# Patient Record
Sex: MALE | Race: BLACK OR AFRICAN AMERICAN | Employment: FULL TIME | URBAN - METROPOLITAN AREA
[De-identification: names, ages, dates, MRNs, and addresses within clinical notes are randomized per-mention and may not be internally consistent; named-entity substitution may affect disease eponyms.]

---

## 2017-01-04 RX ORDER — INSULIN GLARGINE 100 [IU]/ML
INJECTION, SOLUTION SUBCUTANEOUS
Qty: 15 ML | Refills: 0 | Status: SHIPPED | OUTPATIENT
Start: 2017-01-04 | End: 2017-02-09 | Stop reason: SDUPTHER

## 2017-05-03 ENCOUNTER — OFFICE VISIT (OUTPATIENT)
Dept: INTERNAL MEDICINE CLINIC | Age: 57
End: 2017-05-03

## 2017-05-03 VITALS
WEIGHT: 284 LBS | HEART RATE: 62 BPM | SYSTOLIC BLOOD PRESSURE: 133 MMHG | OXYGEN SATURATION: 98 % | BODY MASS INDEX: 45.64 KG/M2 | HEIGHT: 66 IN | RESPIRATION RATE: 20 BRPM | TEMPERATURE: 98.9 F | DIASTOLIC BLOOD PRESSURE: 76 MMHG

## 2017-05-03 DIAGNOSIS — N06.9 ISOLATED PROTEINURIA WITH MORPHOLOGIC LESION: ICD-10-CM

## 2017-05-03 DIAGNOSIS — E78.5 DYSLIPIDEMIA, GOAL LDL BELOW 100: ICD-10-CM

## 2017-05-03 DIAGNOSIS — E11.8 DM (DIABETES MELLITUS) WITH COMPLICATIONS (HCC): Primary | ICD-10-CM

## 2017-05-03 NOTE — MR AVS SNAPSHOT
Visit Information Date & Time Provider Department Dept. Phone Encounter #  
 5/3/2017 10:00 AM Jenny Lunsford MD [facility] [phone] Upcoming Health Maintenance Date Due DTaP/Tdap/Td series (1 - Tdap) 7/8/1981 HEMOGLOBIN A1C Q6M 6/13/2017 FOOT EXAM Q1 6/21/2017 MICROALBUMIN Q1 6/21/2017 LIPID PANEL Q1 6/21/2017 EYE EXAM RETINAL OR DILATED Q1 7/12/2017 INFLUENZA AGE 9 TO ADULT 8/1/2017 COLONOSCOPY 5/17/2018 Allergies as of 5/3/2017  In Progress On: 5/3/2017 By: Kumar Ortiz LPN No Known Allergies Current Immunizations  Reviewed on 10/12/2015 Name Date Pneumococcal Vaccine (Unspecified Type) 8/2/2008 TB Skin Test (PPD) Intradermal 10/12/2015 Not reviewed this visit You Were Diagnosed With   
  
 Codes Comments DM (diabetes mellitus) with complications ([facility]    -  Primary ICD-10-CM: E11.8 ICD-9-CM: 250.90 Dyslipidemia, goal LDL below 100     ICD-10-CM: E78.5 ICD-9-CM: 272.4 Isolated proteinuria with morphologic lesion     ICD-10-CM: N06.9 ICD-9-CM: 543. 9 Vitals BP Pulse Temp Resp Height(growth percentile) Weight(growth percentile) 133/76 62 98.9 °F (37.2 °C) (Oral) 20 5' 6\" (1.676 m) 284 lb (128.8 kg) SpO2 BMI Smoking Status 98% 45.84 kg/m2 Never Smoker Vitals History BMI and BSA Data Body Mass Index Body Surface Area 45.84 kg/m 2 2.45 m 2 Preferred Pharmacy Pharmacy Name Phone [address] [facility] AT [address] [place] [address]. [phone] Your Updated Medication List  
  
   
This list is accurate as of: 5/3/17 10:38 AM.  Always use your most recent med list.  
  
  
  
  
 aspirin 81 mg tablet Take 81 mg by mouth daily. BD INSULIN PEN NEEDLE UF MINI 31 gauge x 3/16\" Ndle Generic drug:  Insulin Needles (Disposable) USE AS DIRECTED  
  
 empagliflozin 25 mg tablet Commonly known as:  Bernadine Crease Take 1 Tab by mouth daily. glucose blood VI test strips strip Commonly known as:  FREESTYLE LITE STRIPS Use 2 times a day * insulin glargine 100 unit/mL (3 mL) pen Commonly known as:  LANTUS SOLOSTAR  
50 u  HS  
  
 * LANTUS SOLOSTAR 100 unit/mL (3 mL) pen Generic drug:  insulin glargine INJECT 40 UNITS UNDER THE SKIN EVERY NIGHT AT BEDTIME  
  
 losartan-hydroCHLOROthiazide 100-25 mg per tablet Commonly known as:  HYZAAR  
TAKE 1 TABLET EVERY DAY  
  
 lovastatin 40 mg tablet Commonly known as:  MEVACOR Take 1 Tab by mouth nightly. metFORMIN  mg tablet Commonly known as:  GLUCOPHAGE XR  
TAKE 4 TABLETS BY MOUTH EVERY DAY  
  
 * Notice: This list has 2 medication(s) that are the same as other medications prescribed for you. Read the directions carefully, and ask your doctor or other care provider to review them with you. We Performed the Following CBC WITH AUTOMATED DIFF [37531 CPT(R)] HEMOGLOBIN A1C W/O EAG [82674 CPT(R)] LIPID PANEL [71742 CPT(R)] METABOLIC PANEL, COMPREHENSIVE [50956 CPT(R)] MICROALBUMIN, UR, RAND W/ MICROALBUMIN/CREA RATIO [id number] CPT(R)] Introducing [facility]! Dear Mary Grace Jackson: Thank you for requesting a [employer] account. Our records indicate that you already have an active [employer] account. You can access your account anytime at [email or web address] Did you know that you can access your hospital and ER discharge instructions at any time in [employer]? You can also review all of your test results from your hospital stay or ER visit. Additional Information If you have questions, please visit the Frequently Asked Questions section of the [employer] website at [email or web address] Remember, [employer] is NOT to be used for urgent needs. For medical emergencies, dial 911. Now available from your iPhone and Android! Please provide this summary of care documentation to your next provider. Your primary care clinician is listed as Kenrick Carrera. If you have any questions after today's visit, please call [phone].

## 2017-05-03 NOTE — PROGRESS NOTES
Chief Complaint   Patient presents with    Follow Up Chronic Condition     diabetes     Chief Complaint   Patient presents with    Follow Up Chronic Condition     diabetes     SUBJECTIVE: Shakeel Vilchis is a 64 y.o. male seen for a follow up visit; he has diabetes, hypertension, hyperlipidemia, obesity and no known cardiovascular conditions. Current Outpatient Prescriptions   Medication Sig Dispense Refill    LANTUS SOLOSTAR 100 unit/mL (3 mL) pen INJECT 40 UNITS UNDER THE SKIN EVERY NIGHT AT BEDTIME 15 mL 5    empagliflozin (JARDIANCE) 25 mg tablet Take 1 Tab by mouth daily. 30 Tab 5    losartan-hydroCHLOROthiazide (HYZAAR) 100-25 mg per tablet TAKE 1 TABLET EVERY DAY 30 Tab 11    BD INSULIN PEN NEEDLE UF MINI 31 gauge x 3/16\" ndle USE AS DIRECTED 100 Pen Needle 11    lovastatin (MEVACOR) 40 mg tablet Take 1 Tab by mouth nightly. 90 Tab 3    metFORMIN ER (GLUCOPHAGE XR) 500 mg tablet TAKE 4 TABLETS BY MOUTH EVERY  Tab 11    glucose blood VI test strips (FREESTYLE LITE STRIPS) strip Use 2 times a day 100 Strip 5    insulin glargine (LANTUS SOLOSTAR) 100 unit/mL (3 mL) pen 50 u  HS 1 Package 5    aspirin 81 mg tablet Take 81 mg by mouth daily.        Patient Active Problem List   Diagnosis Code    Hypertension I10    Morbid obesity ([facility] E66.01    Diabetes ([address] E11.9    Proteinuria R80.9    Other and unspecified disc disorder of lumbar region M51.9    Type II diabetes mellitus, uncontrolled ([address] E11.65    Dyslipidemia, goal LDL below 100 E78.5    Primary osteoarthritis of one knee M17.10     System Review: Cardiovascular risk analysis - LDL goal is under 100, Cardiovascular ROS - taking medications as instructed, no medication side effects noted, no TIA's, no chest pain on exertion, no dyspnea on exertion, no swelling of ankles, no orthopnea or paroxysmal nocturnal dyspnea, no palpitations, Diabetic ROS - medication compliance: compliant all of the time, diabetic diet compliance: compliant all of the time, home glucose monitoring: is performed sporadically  Last 141 fasting, as high as 212 afternoon CNS ROS - no TIA or stroke-like symptoms. New concerns: none . Wt Readings from Last 3 Encounters:   05/03/17 284 lb (128.8 kg)   12/13/16 276 lb (125.2 kg)   08/25/16 279 lb (126.6 kg)   slipped with diet      OBJECTIVE:  Visit Vitals    /76    Pulse 62    Temp 98.9 °F (37.2 °C) (Oral)    Resp 20    Ht 5' 6\" (1.676 m)    Wt 284 lb (128.8 kg)    SpO2 98%    BMI 45.84 kg/m2      Appearance: alert, well appearing, and in no distress and overweight. General exam: CVS exam BP noted to be well controlled today in office, S1, S2 normal, no gallop, no murmur, chest clear, no JVD, no HSM, no edema. Foot exam - bilateral normal; no swelling, tenderness or skin or vascular lesions. Color and temperature is normal. Sensation is intact. Peripheral pulses are palpable. Diabetic foot exam:   Lab review: orders written for new lab studies as appropriate; see orders, no lab studies available for review at time of visit. Results for orders placed or performed in visit on 12/13/16   AMB POC HEMOGLOBIN A1C   Result Value Ref Range    Hemoglobin A1c (POC) 9.9 %     8.0 tgoday  ASSESSMENT:  Diabetes   Improved     , hypertension controlled, hyperlipidemia well controlled. PLAN:  current treatment plan is effective, no change in therapy  lab results and schedule of future lab studies reviewed with patient  repeat labs ordered prior to next appointment. Regulo Camejo was seen today for follow up chronic condition.     Diagnoses and all orders for this visit:    DM (diabetes mellitus) with complications ([facility]  -     CBC WITH AUTOMATED DIFF  -     LIPID PANEL  -     METABOLIC PANEL, COMPREHENSIVE  -     MICROALBUMIN, UR, RAND W/ MICROALBUMIN/CREA RATIO    Dyslipidemia, goal LDL below 100    Isolated proteinuria with morphologic lesion  -     MICROALBUMIN, UR, RAND W/ MICROALBUMIN/CREA RATIO        Stop   Glimepiride add Link Louder was seen today for follow up chronic condition. Diagnoses and all orders for this visit:    DM (diabetes mellitus) with complications ([facility]  -     CBC WITH AUTOMATED DIFF  -     LIPID PANEL  -     METABOLIC PANEL, COMPREHENSIVE  -     MICROALBUMIN, UR, RAND W/ MICROALBUMIN/CREA RATIO    Dyslipidemia, goal LDL below 100    Isolated proteinuria with morphologic lesion  -     MICROALBUMIN, UR, RAND W/ MICROALBUMIN/CREA RATIO      Chavo Hernandez was seen today for follow up chronic condition.     Diagnoses and all orders for this visit:    DM (diabetes mellitus) with complications ([facility]  -     CBC WITH AUTOMATED DIFF  -     LIPID PANEL  -     METABOLIC PANEL, COMPREHENSIVE  -     MICROALBUMIN, UR, RAND W/ MICROALBUMIN/CREA RATIO    Dyslipidemia, goal LDL below 100    Isolated proteinuria with morphologic lesion  -     MICROALBUMIN, UR, RAND W/ MICROALBUMIN/CREA RATIO      Hypertension    Fair controlled for age based on guidelines  Increase dose  jardiance   May need glipizide again

## 2017-05-04 LAB
ALBUMIN SERPL-MCNC: 4 G/DL (ref 3.5–5.5)
ALBUMIN/CREAT UR: 4.6 MG/G CREAT (ref 0–30)
ALBUMIN/GLOB SERPL: 1.4 {RATIO} (ref 1.2–2.2)
ALP SERPL-CCNC: 96 IU/L (ref 39–117)
ALT SERPL-CCNC: 18 IU/L (ref 0–44)
AST SERPL-CCNC: 17 IU/L (ref 0–40)
BASOPHILS # BLD AUTO: 0 X10E3/UL (ref 0–0.2)
BASOPHILS NFR BLD AUTO: 0 %
BILIRUB SERPL-MCNC: 0.4 MG/DL (ref 0–1.2)
BUN SERPL-MCNC: 14 MG/DL (ref 6–24)
BUN/CREAT SERPL: 16 (ref 9–20)
CALCIUM SERPL-MCNC: 9.3 MG/DL (ref 8.7–10.2)
CHLORIDE SERPL-SCNC: 99 MMOL/L (ref 96–106)
CHOLEST SERPL-MCNC: 155 MG/DL (ref 100–199)
CO2 SERPL-SCNC: 25 MMOL/L (ref 18–29)
CREAT SERPL-MCNC: 0.86 MG/DL (ref 0.76–1.27)
CREAT UR-MCNC: 97.1 MG/DL
EOSINOPHIL # BLD AUTO: 0.1 X10E3/UL (ref 0–0.4)
EOSINOPHIL NFR BLD AUTO: 1 %
ERYTHROCYTE [DISTWIDTH] IN BLOOD BY AUTOMATED COUNT: 14.8 % (ref 12.3–15.4)
GLOBULIN SER CALC-MCNC: 2.8 G/DL (ref 1.5–4.5)
GLUCOSE SERPL-MCNC: 129 MG/DL (ref 65–99)
HBA1C MFR BLD: 9.6 % (ref 4.8–5.6)
HCT VFR BLD AUTO: 47.2 % (ref 37.5–51)
HDLC SERPL-MCNC: 60 MG/DL
HGB BLD-MCNC: 15.4 G/DL (ref 12.6–17.7)
IMM GRANULOCYTES # BLD: 0 X10E3/UL (ref 0–0.1)
IMM GRANULOCYTES NFR BLD: 0 %
INTERPRETATION, 910389: NORMAL
LDLC SERPL CALC-MCNC: 85 MG/DL (ref 0–99)
LYMPHOCYTES # BLD AUTO: 2.7 X10E3/UL (ref 0.7–3.1)
LYMPHOCYTES NFR BLD AUTO: 34 %
Lab: NORMAL
MCH RBC QN AUTO: 27.4 PG (ref 26.6–33)
MCHC RBC AUTO-ENTMCNC: 32.6 G/DL (ref 31.5–35.7)
MCV RBC AUTO: 84 FL (ref 79–97)
MICROALBUMIN UR-MCNC: 4.5 UG/ML
MONOCYTES # BLD AUTO: 0.6 X10E3/UL (ref 0.1–0.9)
MONOCYTES NFR BLD AUTO: 7 %
NEUTROPHILS # BLD AUTO: 4.6 X10E3/UL (ref 1.4–7)
NEUTROPHILS NFR BLD AUTO: 58 %
PDF IMAGE, 910387: NORMAL
PLATELET # BLD AUTO: 267 X10E3/UL (ref 150–379)
POTASSIUM SERPL-SCNC: 3.8 MMOL/L (ref 3.5–5.2)
PROT SERPL-MCNC: 6.8 G/DL (ref 6–8.5)
RBC # BLD AUTO: 5.63 X10E6/UL (ref 4.14–5.8)
SODIUM SERPL-SCNC: 142 MMOL/L (ref 134–144)
TRIGL SERPL-MCNC: 52 MG/DL (ref 0–149)
VLDLC SERPL CALC-MCNC: 10 MG/DL (ref 5–40)
WBC # BLD AUTO: 8 X10E3/UL (ref 3.4–10.8)

## 2017-08-13 ENCOUNTER — HOSPITAL ENCOUNTER (EMERGENCY)
Age: 57
Discharge: HOME | End: 2017-08-13
Attending: EMERGENCY MEDICINE | Admitting: EMERGENCY MEDICINE
Payer: COMMERCIAL

## 2017-08-13 VITALS
SYSTOLIC BLOOD PRESSURE: 131 MMHG | WEIGHT: 300 LBS | OXYGEN SATURATION: 97 % | DIASTOLIC BLOOD PRESSURE: 79 MMHG | HEIGHT: 66 IN | RESPIRATION RATE: 20 BRPM | TEMPERATURE: 98 F | HEART RATE: 72 BPM | BODY MASS INDEX: 48.21 KG/M2

## 2017-08-13 DIAGNOSIS — R07.9 ACUTE CHEST PAIN: Primary | ICD-10-CM

## 2017-08-13 LAB
ALBUMIN SERPL BCP-MCNC: 3.1 G/DL (ref 3.5–5)
ALBUMIN/GLOB SERPL: 0.8 {RATIO} (ref 1.1–2.2)
ALP SERPL-CCNC: 104 U/L (ref 45–117)
ALT SERPL-CCNC: 25 U/L (ref 12–78)
ANION GAP BLD CALC-SCNC: 8 MMOL/L (ref 5–15)
AST SERPL W P-5'-P-CCNC: 13 U/L (ref 15–37)
ATRIAL RATE: 77 BPM
BASOPHILS # BLD AUTO: 0 K/UL (ref 0–0.1)
BASOPHILS # BLD: 0 % (ref 0–1)
BILIRUB SERPL-MCNC: 0.2 MG/DL (ref 0.2–1)
BUN SERPL-MCNC: 16 MG/DL (ref 6–20)
BUN/CREAT SERPL: 16 (ref 12–20)
CALCIUM SERPL-MCNC: 8.8 MG/DL (ref 8.5–10.1)
CALCULATED P AXIS, ECG09: 55 DEGREES
CALCULATED R AXIS, ECG10: -12 DEGREES
CALCULATED T AXIS, ECG11: -7 DEGREES
CHLORIDE SERPL-SCNC: 103 MMOL/L (ref 97–108)
CK SERPL-CCNC: 142 U/L (ref 39–308)
CO2 SERPL-SCNC: 29 MMOL/L (ref 21–32)
CREAT SERPL-MCNC: 1.02 MG/DL (ref 0.7–1.3)
DIAGNOSIS, 93000: NORMAL
EOSINOPHIL # BLD: 0.2 K/UL (ref 0–0.4)
EOSINOPHIL NFR BLD: 2 % (ref 0–7)
ERYTHROCYTE [DISTWIDTH] IN BLOOD BY AUTOMATED COUNT: 14.6 % (ref 11.5–14.5)
GLOBULIN SER CALC-MCNC: 3.7 G/DL (ref 2–4)
GLUCOSE SERPL-MCNC: 236 MG/DL (ref 65–100)
HCT VFR BLD AUTO: 43.5 % (ref 36.6–50.3)
HGB BLD-MCNC: 14.2 G/DL (ref 12.1–17)
LYMPHOCYTES # BLD AUTO: 28 % (ref 12–49)
LYMPHOCYTES # BLD: 2.2 K/UL (ref 0.8–3.5)
MCH RBC QN AUTO: 27 PG (ref 26–34)
MCHC RBC AUTO-ENTMCNC: 32.6 G/DL (ref 30–36.5)
MCV RBC AUTO: 82.7 FL (ref 80–99)
MONOCYTES # BLD: 0.6 K/UL (ref 0–1)
MONOCYTES NFR BLD AUTO: 8 % (ref 5–13)
NEUTS SEG # BLD: 4.8 K/UL (ref 1.8–8)
NEUTS SEG NFR BLD AUTO: 62 % (ref 32–75)
P-R INTERVAL, ECG05: 132 MS
PLATELET # BLD AUTO: 246 K/UL (ref 150–400)
POTASSIUM SERPL-SCNC: 3.6 MMOL/L (ref 3.5–5.1)
PROT SERPL-MCNC: 6.8 G/DL (ref 6.4–8.2)
Q-T INTERVAL, ECG07: 376 MS
QRS DURATION, ECG06: 100 MS
QTC CALCULATION (BEZET), ECG08: 425 MS
RBC # BLD AUTO: 5.26 M/UL (ref 4.1–5.7)
SODIUM SERPL-SCNC: 140 MMOL/L (ref 136–145)
TROPONIN I SERPL-MCNC: <0.04 NG/ML
TROPONIN I SERPL-MCNC: <0.04 NG/ML
VENTRICULAR RATE, ECG03: 77 BPM
WBC # BLD AUTO: 7.8 K/UL (ref 4.1–11.1)

## 2017-08-13 PROCEDURE — 93005 ELECTROCARDIOGRAM TRACING: CPT

## 2017-08-13 PROCEDURE — 36415 COLL VENOUS BLD VENIPUNCTURE: CPT | Performed by: EMERGENCY MEDICINE

## 2017-08-13 PROCEDURE — 99285 EMERGENCY DEPT VISIT HI MDM: CPT

## 2017-08-13 PROCEDURE — 82550 ASSAY OF CK (CPK): CPT | Performed by: EMERGENCY MEDICINE

## 2017-08-13 PROCEDURE — 85025 COMPLETE CBC W/AUTO DIFF WBC: CPT | Performed by: EMERGENCY MEDICINE

## 2017-08-13 PROCEDURE — 80053 COMPREHEN METABOLIC PANEL: CPT | Performed by: EMERGENCY MEDICINE

## 2017-08-13 PROCEDURE — 84484 ASSAY OF TROPONIN QUANT: CPT | Performed by: EMERGENCY MEDICINE

## 2017-08-13 RX ORDER — METOPROLOL SUCCINATE 25 MG/1
25 TABLET, EXTENDED RELEASE ORAL DAILY
Qty: 30 TAB | Refills: 3 | Status: SHIPPED | OUTPATIENT
Start: 2017-08-13 | End: 2018-07-09

## 2017-08-13 NOTE — PROGRESS NOTES
Phone # [phone]  My nurse will call for elyse cardiolite 2 day nuclear stress test  PVCs on monitor and he felt them

## 2017-08-13 NOTE — ED NOTES
Pt denies pain at this time, reports mild discomfort in chest with ambulation to restroom. Settled back into bed.

## 2017-08-13 NOTE — ED NOTES
Pt given discharge instructions, patient education, and follow up information by Dr. Jesus Alan. Pt states understanding. All questions answered. Pt discharged to home in private vehicle, ambulatory. Pt A/Ox4, RA, pain controlled.

## 2017-08-13 NOTE — CONSULTS
Cardiology Consultation Note     Subjective:      Govind Lara is a 62 y.o. patient who is seen for evaluation of chest tightness  He described irregular beats when he was driving and because his cousin just  he wanted to stop by ER for evaluation  He has IDDM and hypertension for 30 years and morbid obesity  He does not have chest pain or LOREDO, syncope  While I interviewed him he pointed out that he felt the same and on monitor he had PVCs  ECG sinus rhythm PAC and sinus arrhythmia, poor R wave progression, no ST depression or elevation    Patient Active Problem List   Diagnosis Code    Hypertension I10    Morbid obesity ([facility] E66.01    Diabetes ([facility] E11.9    Proteinuria R80.9    Other and unspecified disc disorder of lumbar region M51.9    Type II diabetes mellitus, uncontrolled ([facility] E11.65    Dyslipidemia, goal LDL below 100 E78.5    Primary osteoarthritis of one knee M17.10     Current Outpatient Prescriptions   Medication Sig Dispense Refill    metoprolol succinate (TOPROL-XL) 25 mg XL tablet Take 1 Tab by mouth daily. 30 Tab 3    JARDIANCE 25 mg tablet TAKE 1 TABLET BY MOUTH DAILY 30 Tab 5    lovastatin (MEVACOR) 40 mg tablet TAKE 1 TABLET BY MOUTH EVERY EVENING 90 Tab 3    metFORMIN ER (GLUCOPHAGE XR) 500 mg tablet TAKE 4 TABLETS BY MOUTH EVERY DAY AS DIRECTED 120 Tab 11    glucose blood VI test strips (FREESTYLE LITE STRIPS) strip Use 2 times a day 100 Strip 5    LANTUS SOLOSTAR 100 unit/mL (3 mL) pen INJECT 40 UNITS UNDER THE SKIN EVERY NIGHT AT BEDTIME 15 mL 5    losartan-hydroCHLOROthiazide (HYZAAR) 100-25 mg per tablet TAKE 1 TABLET EVERY DAY 30 Tab 11    BD INSULIN PEN NEEDLE UF MINI 31 gauge x 3/16\" ndle USE AS DIRECTED 100 Pen Needle 11    insulin glargine (LANTUS SOLOSTAR) 100 unit/mL (3 mL) pen 50 u  HS 1 Package 5    aspirin 81 mg tablet Take 81 mg by mouth daily.        No Known Allergies  Past Medical History:   Diagnosis Date    Diabetes ([facility]     Hypercholesterolemia     Hypertension     Morbid obesity ([facility]     Proteinuria      Past Surgical History:   Procedure Laterality Date    ENDOSCOPY, COLON, DIAGNOSTIC      HX APPENDECTOMY       Family History   Problem Relation Age of Onset    Cancer Mother     Cancer Father     Heart Disease Father    paternal uncles with MI  Social History   Substance Use Topics    Smoking status: Never Smoker    Smokeless tobacco: Never Used    Alcohol use No        Review of Systems:   Constitutional: Negative for fever, chills, weight loss, malaise/fatigue. HEENT: Negative for nosebleeds, vision changes. Respiratory: Negative for cough, hemoptysis  Cardiovascular: Negative for chest pain, + palpitations, no orthopnea, claudication, leg swelling, syncope, and PND. Gastrointestinal: Negative for nausea, vomiting, diarrhea, blood in stool and melena. Genitourinary: Negative for dysuria, and hematuria. Musculoskeletal: Negative for myalgias, + knee arthralgia. Skin: Negative for rash. Heme: Does not bleed or bruise easily. Neurological: Negative for speech change and focal weakness     Objective:     Visit Vitals    /73    Pulse 69    Temp 98.1 °F (36.7 °C)    Resp 26    Ht 5' 6\" (1.676 m)    Wt 300 lb (136.1 kg)    SpO2 96%    BMI 48.42 kg/m2      Physical Exam:   Constitutional: well-developed and well-nourished. No respiratory distress. Head: Normocephalic and atraumatic. Eyes: Pupils are equal, round  ENT: hearing normal  Neck: supple. No JVD present. Cardiovascular: Normal rate, regular rhythm. Exam reveals no gallop and no friction rub. No murmur heard. Pulmonary/Chest: Effort normal and breath sounds normal. No wheezes. Abdominal: Soft, morbidly obese  Musculoskeletal: 1+ edema. Neurological: alert,oriented. Skin: Skin is warm and dry  Psychiatric: normal mood and affect.  Behavior is normal. Judgment and thought content normal.           Assessment/Plan: PVCs  PAC  Sinus arrhythmia  IDDM  Hypertension  Morbid obesity    ECG is not suggestive of acute MI or ischemia and troponin is negative  I discussed CAD as possibility and he wanted to go home with lexican cardiolite nuclear stress test 2 days (due to weight) at [facility] office  My nurse will call him  I start toprol as he has PVCs with palpitations and also one BP reading was 150/91 mmHg in ER  K 3.6 he is on hyzaar    Thank you for involving me in this patient's care and please call with further concerns or questions. Rigoberto Combs M.D.   Electrophysiology/Cardiology  [facility]  [address] [address] , [address]  [phone]

## 2017-08-13 NOTE — ED PROVIDER NOTES
HPI Comments: 62 y.o. male with past medical history significant for HTN, morbid obesity, DM, proteinuria, hypercholesterolemia who presents from home with chief complaint of chest pain. Patient states this morning prior to arrival  he was driving on his way to [employer] when he felt a sharp, shooting pain in the middle of his chest with no radiation. Pt states intermittent episodic chest pain \"for a couple of minutes\" with associated slight diaphoresis. Pt reports initial onset of pain was 5/10 however has gradually decreased since onset, now at a 1/10. The pain is focalized in the middle of his chest and does not radiate. Pt cannot identify any worsening or relieving factors. Pt denies any SOB. Pt denies any previous history of symptoms similar to those presented today in ED. Pt denies previous significant cardiovascular issues. Pt does report significant family history of heart disease. Pt has a previous history of diabetes and hypertension managed with daily medications. Pt denies fever, chills, cough, congestion, abdominal pain, nausea, vomiting, diarrhea, difficulty urinating, or dysuria. Pt denies any other acute medical complaints. There are no other acute medical concerns at this time. Social hx: non smoker, no EtOH use  Significant FHx: MI in 2 paternal uncles, aged 39 and 79 both    PCP: Alejandro Ta MD    Note written by Leland Luna, as dictated by Parish Calabrese MD 9:21 AM      The history is provided by the patient. No  was used.         Past Medical History:   Diagnosis Date    Diabetes ([address]     Hypercholesterolemia     Hypertension     Morbid obesity ([address]     Proteinuria        Past Surgical History:   Procedure Laterality Date    ENDOSCOPY, COLON, DIAGNOSTIC      HX APPENDECTOMY           Family History:   Problem Relation Age of Onset    Cancer Mother     Cancer Father     Heart Disease Father        Social History     Social History    Marital status: SINGLE     Spouse name: N/A    Number of children: N/A    Years of education: N/A     Occupational History    Not on file. Social History Main Topics    Smoking status: Never Smoker    Smokeless tobacco: Never Used    Alcohol use No    Drug use: No    Sexual activity: Not Currently     Other Topics Concern    Not on file     Social History Narrative         ALLERGIES: Review of patient's allergies indicates no known allergies. Review of Systems   Constitutional: Positive for diaphoresis (resolved). Negative for activity change and fever. Respiratory: Negative for cough and shortness of breath. Cardiovascular: Positive for chest pain. Gastrointestinal: Negative for abdominal pain, diarrhea, nausea and vomiting. Genitourinary: Negative for dysuria. Musculoskeletal: Negative for back pain. Skin: Negative for color change. Neurological: Negative for syncope, weakness and light-headedness. All other systems reviewed and are negative. Vitals:    08/13/17 1200 08/13/17 1215 08/13/17 1230 08/13/17 1300   BP: 130/67 145/75  119/74   Pulse: 73 72  76   Resp: 23 20  25   Temp:       SpO2: 98% 98% 98% 97%   Weight:       Height:                Physical Exam   Constitutional: He is oriented to person, place, and time. He appears well-nourished. No distress. Obese. HENT:   Head: Normocephalic and atraumatic. Eyes: Conjunctivae are normal. No scleral icterus. Neck: Neck supple. No tracheal deviation present. Cardiovascular: Normal rate, regular rhythm, normal heart sounds and intact distal pulses. Exam reveals no gallop and no friction rub. No murmur heard. Pulmonary/Chest: Effort normal and breath sounds normal. He has no wheezes. He has no rales. Abdominal: Soft. He exhibits no distension. There is no tenderness. There is no rebound and no guarding. Musculoskeletal: He exhibits no edema. Neurological: He is alert and oriented to person, place, and time. Skin: Skin is warm and dry. No rash noted. Psychiatric: He has a normal mood and affect. Nursing note and vitals reviewed. Note written by Purnima Rojas, as dictated by Juan Carlos Hernandez MD 9:52 AM      [facility]  ED Course       Procedures    PROGRESS NOTE: 10:15 AM  Pt seen ambulating to bathroom without assistance. PROGRESS NOTE:11:12 AM  Troponin is negative      PROGRESS NOTE:12:17 PM  Patient is resting comfortably and reports that he has had no reoccurring episodes of pain. Note: Heart score; history zero; EKG zero; age +2; risk factors +2; troponin; zero  12:19 PM  Note written by Juan Carlos Hernandez MD     ED EKG interpretation:  Rhythm: normal sinus rhythm and PAC's; and irregular. Rate (approx.): 75; Axis: normal; P wave: normal; QRS interval: normal ; ST/T wave: normal; in  Lead: ; Other findings: . This EKG was interpreted by Bhumi Quintero, ED Provider. 12:20 PM      CONSULT NOTE:  12:41 PM Juan Carlos Hernandez MD spoke with Dr. Alli Bar, Consult for Cardiology. Discussed available diagnostic tests and clinical findings. Dr. Alli Bar will come and see patient    PROGRESS NOTE:1:12 PM  Repeat Troponin is negative        CONSULT NOTE:  1:17 PM Juan Carlos Hernandez MD spoke with Dr. Alli Bar, Consult for Cardiology. Dr. Alli Bar spoke with pt and wrote a rx for Toprol and suggests that pt can be discharged home if his repeat troponin level comes back negative.

## 2017-08-13 NOTE — ED NOTES
Unable to obtain IV access, MD made aware, MD okay with pt not having IV access at this time. Will redraw blood with venipuncture.

## 2017-08-13 NOTE — DISCHARGE INSTRUCTIONS
Phone # [phone]  [facility] cardi[facility] 2 day nuclear stress test            Chest Pain: Care Instructions  Your Care Instructions  There are many things that can cause chest pain. Some are not serious and will get better on their own in a few days. But some kinds of chest pain need more testing and treatment. Your doctor may have recommended a follow-up visit in the next 8 to 12 hours. If you are not getting better, you may need more tests or treatment. Even though your doctor has released you, you still need to watch for any problems. The doctor carefully checked you, but sometimes problems can develop later. If you have new symptoms or if your symptoms do not get better, get medical care right away. If you have worse or different chest pain or pressure that lasts more than 5 minutes or you passed out (lost consciousness), call 911 or seek other emergency help right away. A medical visit is only one step in your treatment. Even if you feel better, you still need to do what your doctor recommends, such as going to all suggested follow-up appointments and taking medicines exactly as directed. This will help you recover and help prevent future problems. How can you care for yourself at home? · Rest until you feel better. · Take your medicine exactly as prescribed. Call your doctor if you think you are having a problem with your medicine. · Do not drive after taking a prescription pain medicine. When should you call for help? Call 911 if:  · You passed out (lost consciousness). · You have severe difficulty breathing. · You have symptoms of a heart attack. These may include:  ¨ Chest pain or pressure, or a strange feeling in your chest.  ¨ Sweating. ¨ Shortness of breath. ¨ Nausea or vomiting. ¨ Pain, pressure, or a strange feeling in your back, neck, jaw, or upper belly or in one or both shoulders or arms. ¨ Lightheadedness or sudden weakness.   ¨ A fast or irregular heartbeat. After you call 911, the  may tell you to chew 1 adult-strength or 2 to 4 low-dose aspirin. Wait for an ambulance. Do not try to drive yourself. Call your doctor today if:  · You have any trouble breathing. · Your chest pain gets worse. · You are dizzy or lightheaded, or you feel like you may faint. · You are not getting better as expected. · You are having new or different chest pain. Where can you learn more? Go to http://bisi-natacha.info/. Enter A120 in the search box to learn more about \"Chest Pain: Care Instructions. \"  Current as of: March 20, 2017  Content Version: 11.3  © 1448-3324 [employer]. Care instructions adapted under license by [employer] (which disclaims liability or warranty for this information). If you have questions about a medical condition or this instruction, always ask your healthcare professional. [place] [address] any warranty or liability for your use of this information. We hope that we have addressed all of your medical concerns. The examination and treatment you received in the Emergency Department were for an emergent problem and were not intended as complete care. It is important that you follow up with your healthcare provider(s) for ongoing care. If your symptoms worsen or do not improve as expected, and you are unable to reach your usual health care provider(s), you should return to the Emergency Department. Today's healthcare is undergoing tremendous change, and patient satisfaction surveys are one of the many tools to assess the quality of medical care. You may receive a survey from the [employer] organization regarding your experience in the Emergency Department. I hope that your experience has been completely positive, particularly the medical care that I provided.   As such, please participate in the survey; anything less than excellent does not meet my expectations or intentions. [facility] and [facility] participate in nationally recognized quality of care measures. If your blood pressure is greater than 120/80, as reported below, we urge that you seek medical care to address the potential of high blood pressure, commonly known as hypertension. Hypertension can be hereditary or can be caused by certain medical conditions, pain, stress, or \"white coat syndrome. \"       Please make an appointment with your health care provider(s) for follow up of your Emergency Department visit. VITALS:   Patient Vitals for the past 8 hrs:   Temp Pulse Resp BP SpO2   08/13/17 1315 - 69 26 137/73 96 %   08/13/17 1300 - 76 25 119/74 97 %   08/13/17 1230 - - - - 98 %   08/13/17 1215 - 72 20 145/75 98 %   08/13/17 1200 - 73 23 130/67 98 %   08/13/17 1130 - 66 19 136/76 96 %   08/13/17 1100 - 71 24 133/74 96 %   08/13/17 1045 - 73 24 112/55 97 %   08/13/17 1030 - - - 132/63 -   08/13/17 1015 - 69 23 - 96 %   08/13/17 1000 - 70 26 135/70 95 %   08/13/17 0930 - 75 21 134/72 94 %   08/13/17 0917 98.1 °F (36.7 °C) 75 16 140/70 95 %          Thank you for allowing us to provide you with medical care today. We realize that you have many choices for your emergency care needs. Please choose us in the future for any continued health care needs.       MD CAPRI Fermin [facility] 70: [phone]            Recent Results (from the past 24 hour(s))   EKG, 12 LEAD, INITIAL    Collection Time: 08/13/17  9:19 AM   Result Value Ref Range    Ventricular Rate 77 BPM    Atrial Rate 77 BPM    P-R Interval 132 ms    QRS Duration 100 ms    Q-T Interval 376 ms    QTC Calculation (Bezet) 425 ms    Calculated P Axis 55 degrees    Calculated R Axis -12 degrees    Calculated T Axis -7 degrees    Diagnosis       Sinus rhythm with premature atrial complexes in a pattern of bigeminy  No previous ECGs available     CBC WITH AUTOMATED DIFF    Collection Time: 08/13/17 10:13 AM   Result Value Ref Range    WBC 7.8 4.1 - 11.1 K/uL    RBC 5.26 4.10 - 5.70 M/uL    HGB 14.2 12.1 - 17.0 g/dL    HCT 43.5 36.6 - 50.3 %    MCV 82.7 80.0 - 99.0 FL    MCH 27.0 26.0 - 34.0 PG    MCHC 32.6 30.0 - 36.5 g/dL    RDW 14.6 (H) 11.5 - 14.5 %    PLATELET 176 332 - 627 K/uL    NEUTROPHILS 62 32 - 75 %    LYMPHOCYTES 28 12 - 49 %    MONOCYTES 8 5 - 13 %    EOSINOPHILS 2 0 - 7 %    BASOPHILS 0 0 - 1 %    ABS. NEUTROPHILS 4.8 1.8 - 8.0 K/UL    ABS. LYMPHOCYTES 2.2 0.8 - 3.5 K/UL    ABS. MONOCYTES 0.6 0.0 - 1.0 K/UL    ABS. EOSINOPHILS 0.2 0.0 - 0.4 K/UL    ABS. BASOPHILS 0.0 0.0 - 0.1 K/UL   CK W/ REFLX CKMB    Collection Time: 08/13/17 10:13 AM   Result Value Ref Range     39 - 896 U/L   METABOLIC PANEL, COMPREHENSIVE    Collection Time: 08/13/17 10:13 AM   Result Value Ref Range    Sodium 140 136 - 145 mmol/L    Potassium 3.6 3.5 - 5.1 mmol/L    Chloride 103 97 - 108 mmol/L    CO2 29 21 - 32 mmol/L    Anion gap 8 5 - 15 mmol/L    Glucose 236 (H) 65 - 100 mg/dL    BUN 16 6 - 20 MG/DL    Creatinine 1.02 0.70 - 1.30 MG/DL    BUN/Creatinine ratio 16 12 - 20      GFR est AA >60 >60 ml/min/1.73m2    GFR est non-AA >60 >60 ml/min/1.73m2    Calcium 8.8 8.5 - 10.1 MG/DL    Bilirubin, total 0.2 0.2 - 1.0 MG/DL    ALT (SGPT) 25 12 - 78 U/L    AST (SGOT) 13 (L) 15 - 37 U/L    Alk. phosphatase 104 45 - 117 U/L    Protein, total 6.8 6.4 - 8.2 g/dL    Albumin 3.1 (L) 3.5 - 5.0 g/dL    Globulin 3.7 2.0 - 4.0 g/dL    A-G Ratio 0.8 (L) 1.1 - 2.2     TROPONIN I    Collection Time: 08/13/17 10:13 AM   Result Value Ref Range    Troponin-I, Qt. <0.04 <0.05 ng/mL   TROPONIN I    Collection Time: 08/13/17 12:37 PM   Result Value Ref Range    Troponin-I, Qt. <0.04 <0.05 ng/mL       No results found.

## 2017-08-13 NOTE — ED TRIAGE NOTES
Pt was driving to [place] this morning and had sudden onset of midsternal CP without radiation, mild diaphoresis, no SOB.  Pt denies N/V.

## 2017-08-14 ENCOUNTER — TELEPHONE (OUTPATIENT)
Dept: CARDIOLOGY CLINIC | Age: 57
End: 2017-08-14

## 2017-08-14 DIAGNOSIS — R07.9 CHEST PAIN, UNSPECIFIED TYPE: Primary | ICD-10-CM

## 2017-08-14 NOTE — TELEPHONE ENCOUNTER
----- Message from Danita Hwang MD sent at 8/13/2017  1:13 PM EDT -----  Phone # [phone]  [facility]  PVCs on monitor and he felt them  CP

## 2017-08-18 ENCOUNTER — OFFICE VISIT (OUTPATIENT)
Dept: INTERNAL MEDICINE CLINIC | Age: 57
End: 2017-08-18

## 2017-08-18 VITALS
RESPIRATION RATE: 16 BRPM | DIASTOLIC BLOOD PRESSURE: 76 MMHG | HEIGHT: 66 IN | BODY MASS INDEX: 46.28 KG/M2 | SYSTOLIC BLOOD PRESSURE: 126 MMHG | HEART RATE: 66 BPM | WEIGHT: 288 LBS | OXYGEN SATURATION: 98 %

## 2017-08-18 DIAGNOSIS — I10 ESSENTIAL HYPERTENSION: ICD-10-CM

## 2017-08-18 DIAGNOSIS — R07.89 ATYPICAL CHEST PAIN: ICD-10-CM

## 2017-08-18 DIAGNOSIS — E78.5 DYSLIPIDEMIA, GOAL LDL BELOW 100: ICD-10-CM

## 2017-08-18 DIAGNOSIS — D49.2 ABNORMAL SKIN GROWTH: ICD-10-CM

## 2017-08-18 LAB — HBA1C MFR BLD HPLC: 8.8 %

## 2017-08-18 NOTE — PROGRESS NOTES
Coordination of Care Questions    1. Have you been to the ER, urgent care clinic since your last visit? Yes 8/13 chest pain       Hospitalized since your last visit? No    2. Have you seen or consulted any other health care providers outside of the [address] [place] [address] since your last visit? Include any pap smears or colon screening.  No

## 2017-08-18 NOTE — MR AVS SNAPSHOT
Visit Information Date & Time Provider Department Dept. Phone Encounter #  
 8/18/2017  8:30 AM Tank Frank MD [facility] [phone] [id number] Your Appointments 8/24/2017  8:30 AM  
NUCLEAR MEDICINE with NUCLEARFRITZ [facility] (KATHRYN SCHEDULING) Appt Note: 2 day kaleb card per Dr Jodee Fraire ht 5'6 wt 300 dx pvcs on monitor cc holds records [address] [place] [address]  
[address] [place] [address]  
  
    
 8/25/2017  8:30 AM  
NUCLEAR MEDICINE with NUCLEAR, Hitesh Monet [facility] (KATHRYN SCHEDULING) Appt Note: 2nd day resting [address] [place]  
[phone]  
  
    
 11/16/2017  9:45 AM  
ROUTINE CARE with aTnk Frank MD  
[facility] [address] [facility]) Appt Note: 3 MONTH F/U  
 [place] [address] [facility] [address] [place] [address] [address] [facility] [address] [facility] 7 [id number] Upcoming Health Maintenance Date Due DTaP/Tdap/Td series (1 - Tdap) 7/8/1981 FOOT EXAM Q1 6/21/2017 EYE EXAM RETINAL OR DILATED Q1 7/12/2017 INFLUENZA AGE 9 TO ADULT 8/1/2017 HEMOGLOBIN A1C Q6M 11/3/2017 MICROALBUMIN Q1 5/3/2018 LIPID PANEL Q1 5/3/2018 COLONOSCOPY 5/17/2018 Allergies as of 8/18/2017  Review Complete On: 8/18/2017 By: Tyesha Manjarrez LPN No Known Allergies Current Immunizations  Reviewed on 10/12/2015 Name Date  
 TB Skin Test (PPD) Intradermal 10/12/2015 ZZZ-RETIRED (DO NOT USE) Pneumococcal Vaccine (Unspecified Type) 8/2/2008 Not reviewed this visit You Were Diagnosed With   
  
 Codes Comments Uncontrolled type 2 diabetes mellitus without complication, with long-term current use of insulin ([facility] [address] [facility]    -  Primary ICD-10-CM: E11.65, Z79.4 ICD-9-CM: 250.02, V58.67   
 Abnormal skin growth     ICD-10-CM: D49.2 ICD-9-CM: 239.2 Vitals BP Pulse Resp Height(growth percentile) Weight(growth percentile) SpO2  
 126/76 66 16 5' 6\" (1.676 m) 288 lb (130.6 kg) 98% BMI Smoking Status 46.48 kg/m2 Never Smoker Vitals History BMI and BSA Data Body Mass Index Body Surface Area  
 46.48 kg/m 2 2.47 m 2 Preferred Pharmacy Pharmacy Name Phone [facility] [address]  [facility] [phone] Your Updated Medication List  
  
   
This list is accurate as of: 8/18/17  9:02 AM.  Always use your most recent med list.  
  
  
  
  
 aspirin 81 mg tablet Take 81 mg by mouth daily. BD INSULIN PEN NEEDLE UF MINI 31 gauge x 3/16\" Ndle Generic drug:  Insulin Needles (Disposable) USE AS DIRECTED  
  
 glucose blood VI test strips strip Commonly known as:  FREESTYLE LITE STRIPS Use 2 times a day * insulin glargine 100 unit/mL (3 mL) Inpn Commonly known as:  LANTUS,BASAGLAR  
50 u  HS  
  
 * LANTUS SOLOSTAR 100 unit/mL (3 mL) Inpn Generic drug:  insulin glargine INJECT 40 UNITS UNDER THE SKIN EVERY NIGHT AT BEDTIME  
  
 JARDIANCE 25 mg tablet Generic drug:  empagliflozin TAKE 1 TABLET BY MOUTH DAILY losartan-hydroCHLOROthiazide 100-25 mg per tablet Commonly known as:  HYZAAR  
TAKE 1 TABLET EVERY DAY  
  
 lovastatin 40 mg tablet Commonly known as:  MEVACOR  
TAKE 1 TABLET BY MOUTH EVERY EVENING  
  
 metFORMIN  mg tablet Commonly known as:  GLUCOPHAGE XR  
TAKE 4 TABLETS BY MOUTH EVERY DAY AS DIRECTED  
  
 metoprolol succinate 25 mg XL tablet Commonly known as:  TOPROL-XL Take 1 Tab by mouth daily. * Notice: This list has 2 medication(s) that are the same as other medications prescribed for you. Read the directions carefully, and ask your doctor or other care provider to review them with you. We Performed the Following AMB POC HEMOGLOBIN A1C [47640 CPT(R)] REFERRAL TO DERMATOLOGY [REF19 Custom] Referral Information Referral ID Referred By Referred To  
  
 [id number] Gino Pandey MD   
   [facility], [address] [place] [address] Phone: [phone] Fax: [phone] Visits Status Start Date End Date 1 New Request 8/18/17 8/18/18 If your referral has a status of pending review or denied, additional information will be sent to support the outcome of this decision. Introducing \[facility] [place] [facility]\"" & HEALTH SERVICES! Dear Maximiliano Atkinson: Thank you for requesting a [employer] account. Our records indicate that you already have an active [employer] account. You can access your account anytime at [email or web address] Did you know that you can access your hospital and ER discharge instructions at any time in [employer]? You can also review all of your test results from your hospital stay or ER visit. Additional Information If you have questions, please visit the Frequently Asked Questions section of the [employer] website at [email or web address] Remember, [employer] is NOT to be used for urgent needs. For medical emergencies, dial 911. Now available from your iPhone and Android! Please provide this summary of care documentation to your next provider. Your primary care clinician is listed as Petra Martins. If you have any questions after today's visit, please call [phone].

## 2017-08-18 NOTE — PROGRESS NOTES
Chief Complaint   Patient presents with    Follow-up     3 mon      Seen er last week chest pain was sent home     Stress test scheduled next week,  No further symptoms  Placed on beta blocker        SUBJECTIVE: Brinton Harada is a 62 y.o. male seen for a follow up visit; he has diabetes, hypertension, hyperlipidemia and obesity. Current Outpatient Prescriptions   Medication Sig Dispense Refill    metoprolol succinate (TOPROL-XL) 25 mg XL tablet Take 1 Tab by mouth daily. 30 Tab 3    JARDIANCE 25 mg tablet TAKE 1 TABLET BY MOUTH DAILY 30 Tab 5    lovastatin (MEVACOR) 40 mg tablet TAKE 1 TABLET BY MOUTH EVERY EVENING 90 Tab 3    metFORMIN ER (GLUCOPHAGE XR) 500 mg tablet TAKE 4 TABLETS BY MOUTH EVERY DAY AS DIRECTED 120 Tab 11    glucose blood VI test strips (FREESTYLE LITE STRIPS) strip Use 2 times a day 100 Strip 5    LANTUS SOLOSTAR 100 unit/mL (3 mL) pen INJECT 40 UNITS UNDER THE SKIN EVERY NIGHT AT BEDTIME 15 mL 5    losartan-hydroCHLOROthiazide (HYZAAR) 100-25 mg per tablet TAKE 1 TABLET EVERY DAY 30 Tab 11    BD INSULIN PEN NEEDLE UF MINI 31 gauge x 3/16\" ndle USE AS DIRECTED 100 Pen Needle 11    insulin glargine (LANTUS SOLOSTAR) 100 unit/mL (3 mL) pen 50 u  HS 1 Package 5    aspirin 81 mg tablet Take 81 mg by mouth daily.        Patient Active Problem List   Diagnosis Code    Hypertension I10    Morbid obesity ([address] E66.01    Diabetes ([facility] [address] E11.9    Proteinuria R80.9    Other and unspecified disc disorder of lumbar region M51.9    Type II diabetes mellitus, uncontrolled ([address] E11.65    Dyslipidemia, goal LDL below 100 E78.5    Primary osteoarthritis of one knee M17.10     System Review: Cardiovascular ROS - taking medications as instructed, no medication side effects noted, no TIA's, no chest pain on exertion, no dyspnea on exertion, no swelling of ankles, no orthostatic dizziness or lightheadedness, Diabetic ROS - medication compliance: compliant all of the time, diabetic diet compliance: compliant most of the time, home glucose monitoring: is performed sporadically, lately has been lower. New concerns: atypical chest  Tingles and PACs seen. Black mole on forehead    OBJECTIVE:  Visit Vitals    /76    Pulse 66    Resp 16    Ht 5' 6\" (1.676 m)    Wt 288 lb (130.6 kg)    SpO2 98%    BMI 46.48 kg/m2      Appearance: alert, well appearing, and in no distress and overweight. General exam: CVS exam BP noted to be well controlled today in office, S1, S2 normal, no gallop, no murmur, chest clear, no JVD, no HSM, no edema. Mole irreg dark to see derm  Lab review: labs reviewed, I note that glycosylated hemoglobin abnormal   Results for orders placed or performed in visit on 08/18/17   AMB POC HEMOGLOBIN A1C   Result Value Ref Range    Hemoglobin A1c (POC) 8.8 %     . ASSESSMENT:  diabetes needs further observation, hypertension stable, hyperlipidemia stable. PLAN:  lab results and schedule of future lab studies reviewed with patient  the following changes are made - cardiac work upincrease basal insulin see orders    1. Uncontrolled type 2 diabetes mellitus without complication, with long-term current use of insulin (HCC)  A little better  - AMB POC HEMOGLOBIN A1C    2. Abnormal skin growth  discussed  - REFERRAL TO DERMATOLOGY    3. Dyslipidemia, goal LDL below 100  Lab Results   Component Value Date/Time    Cholesterol, total 155 05/03/2017 12:00 AM    HDL Cholesterol 60 05/03/2017 12:00 AM    LDL, calculated 85 05/03/2017 12:00 AM    VLDL, calculated 10 05/03/2017 12:00 AM    Triglyceride 52 05/03/2017 12:00 AM    CHOL/HDL Ratio 2.9 08/18/2009 09:38 AM         4. Essential hypertension  controlled    5.  Atypical chest pain  Doubt cardiac

## 2017-08-24 ENCOUNTER — DOCUMENTATION ONLY (OUTPATIENT)
Dept: CARDIOLOGY CLINIC | Age: 57
End: 2017-08-24

## 2017-08-24 ENCOUNTER — CLINICAL SUPPORT (OUTPATIENT)
Dept: CARDIOLOGY CLINIC | Age: 57
End: 2017-08-24

## 2017-08-24 DIAGNOSIS — R07.9 CHEST PAIN, UNSPECIFIED TYPE: ICD-10-CM

## 2017-08-24 DIAGNOSIS — I10 ESSENTIAL HYPERTENSION, BENIGN: ICD-10-CM

## 2017-08-24 DIAGNOSIS — E78.5 HYPERLIPIDEMIA, UNSPECIFIED HYPERLIPIDEMIA TYPE: ICD-10-CM

## 2017-08-24 NOTE — PROGRESS NOTES
[facility]se cardiolite stress test:  Stress ECG PAC and PVC, no ischemia  Stress perfusion: basal inferior attenuated from diaphragm, normalized by prone   GATED spect LVEF 42% due to PAC and PVC but visually 55%    Conclusion: normal stress test      Follow up if symptom recurs to reassess

## 2017-10-12 ENCOUNTER — OFFICE VISIT (OUTPATIENT)
Dept: INTERNAL MEDICINE CLINIC | Age: 57
End: 2017-10-12

## 2017-10-12 ENCOUNTER — TELEPHONE (OUTPATIENT)
Dept: INTERNAL MEDICINE CLINIC | Age: 57
End: 2017-10-12

## 2017-10-12 VITALS
OXYGEN SATURATION: 98 % | RESPIRATION RATE: 20 BRPM | WEIGHT: 293 LBS | BODY MASS INDEX: 47.09 KG/M2 | SYSTOLIC BLOOD PRESSURE: 113 MMHG | DIASTOLIC BLOOD PRESSURE: 80 MMHG | HEART RATE: 70 BPM | HEIGHT: 66 IN | TEMPERATURE: 98.4 F

## 2017-10-12 DIAGNOSIS — M54.2 NECK PAIN ON LEFT SIDE: Primary | ICD-10-CM

## 2017-10-12 RX ORDER — CYCLOBENZAPRINE HCL 10 MG
10 TABLET ORAL
Qty: 21 TAB | Refills: 0 | Status: SHIPPED | OUTPATIENT
Start: 2017-10-12 | End: 2018-07-09 | Stop reason: ALTCHOICE

## 2017-10-12 RX ORDER — DICLOFENAC SODIUM 50 MG/1
50 TABLET, DELAYED RELEASE ORAL 2 TIMES DAILY
Qty: 14 TAB | Refills: 0 | Status: SHIPPED | OUTPATIENT
Start: 2017-10-12 | End: 2018-07-09 | Stop reason: ALTCHOICE

## 2017-10-12 NOTE — PROGRESS NOTES
HISTORY OF PRESENT ILLNESS  Skyla Rodriguez is a 62 y.o. male. HPI  Patient presents to the office for evaluation of his neck. He reports about one month ago he had some neck pain. It resolved with time, then this past Sunday he had severe left side neck pain. He does not recall doing anything to cause the pain. 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 He has used topical cream on the area with a warm towel. That did help the pain but it soon returned. He has not taken anything orally. Review of Systems   Musculoskeletal: Positive for neck pain. Blood pressure 113/80, pulse 70, temperature 98.4 °F (36.9 °C), temperature source Oral, resp. rate 20, height 5' 6\" (1.676 m), weight 293 lb (132.9 kg), SpO2 98 %. Physical Exam   Musculoskeletal: He exhibits tenderness. He exhibits no edema or deformity. Neck- tenderness along the left paravertebralis with palpation. Increase pain in this area with rotation of the neck. He denies numbness or tingling down his arms with rotation. ASSESSMENT and PLAN  Diagnoses and all orders for this visit:    1. Neck pain on left side  -     diclofenac EC (VOLTAREN) 50 mg EC tablet; Take 1 Tab by mouth two (2) times a day. -     cyclobenzaprine (FLEXERIL) 10 mg tablet; Take 1 Tab by mouth three (3) times daily as needed for Muscle Spasm(s). take medication as prescribed. He has been given stretching exercises to start doing. Advised him throughout his work day to stretch some. If he does not get better with treatment, he should have a xray done and/or PT. Patient also would like to know if he should continue the toprol that was started at the ER. He does not want to take this medication if he does not have to. I will check with Dr. Marylen Ng about that    All this discussed with the patient and he agrees and understands.

## 2017-10-12 NOTE — MR AVS SNAPSHOT
Visit Information Date & Time Provider Department Dept. Phone Encounter #  
 10/12/2017  9:50 AM Deo Zamora PA-C [facility] [phone] [id number] Your Appointments 11/16/2017  9:45 AM  
ROUTINE CARE with Mary Lizarraga MD  
[facility] [address]) Appt Note: 3 MONTH F/U  
 [place] [address] [facility] [address]  
[address] [facility] 121 [facility] 7 [address] Upcoming Health Maintenance Date Due DTaP/Tdap/Td series (1 - Tdap) 7/8/1981 FOOT EXAM Q1 6/21/2017 INFLUENZA AGE 9 TO ADULT 8/1/2017 HEMOGLOBIN A1C Q6M 2/18/2018 MICROALBUMIN Q1 5/3/2018 LIPID PANEL Q1 5/3/2018 COLONOSCOPY 5/17/2018 EYE EXAM RETINAL OR DILATED Q1 9/13/2018 Allergies as of 10/12/2017  In Progress On: 10/12/2017 By: Mani Fernandez LPN No Known Allergies Current Immunizations  Reviewed on 10/12/2015 Name Date  
 TB Skin Test (PPD) Intradermal 10/12/2015 ZZZ-RETIRED (DO NOT USE) Pneumococcal Vaccine (Unspecified Type) 8/2/2008 Not reviewed this visit You Were Diagnosed With   
  
 Codes Comments Neck pain on left side    -  Primary ICD-10-CM: M54.2 ICD-9-CM: 723.1 Vitals BP Pulse Temp Resp Height(growth percentile) Weight(growth percentile) 113/80 70 98.4 °F (36.9 °C) (Oral) 20 5' 6\" (1.676 m) 293 lb (132.9 kg) SpO2 BMI Smoking Status 98% 47.29 kg/m2 Never Smoker Vitals History BMI and BSA Data Body Mass Index Body Surface Area  
 47.29 kg/m 2 2.49 m 2 Preferred Pharmacy Pharmacy Name Phone [facility] 1 [address]  [facility] [phone] Your Updated Medication List  
  
   
This list is accurate as of: 10/12/17 10:03 AM.  Always use your most recent med list.  
  
  
  
  
 aspirin 81 mg tablet Take 81 mg by mouth daily. BD INSULIN PEN NEEDLE UF MINI 31 gauge x 3/16\" Ndle Generic drug:  Insulin Needles (Disposable) USE AS DIRECTED  
  
 cyclobenzaprine 10 mg tablet Commonly known as:  FLEXERIL Take 1 Tab by mouth three (3) times daily as needed for Muscle Spasm(s). diclofenac EC 50 mg EC tablet Commonly known as:  VOLTAREN Take 1 Tab by mouth two (2) times a day. glucose blood VI test strips strip Commonly known as:  FREESTYLE LITE STRIPS Use 2 times a day  
  
 insulin glargine 100 unit/mL (3 mL) Inpn Commonly known as:  LANTUS,BASAGLAR  
50 u  HS  
  
 JARDIANCE 25 mg tablet Generic drug:  empagliflozin TAKE 1 TABLET BY MOUTH DAILY losartan-hydroCHLOROthiazide 100-25 mg per tablet Commonly known as:  HYZAAR  
TAKE 1 TABLET EVERY DAY  
  
 lovastatin 40 mg tablet Commonly known as:  MEVACOR  
TAKE 1 TABLET BY MOUTH EVERY EVENING  
  
 metFORMIN  mg tablet Commonly known as:  GLUCOPHAGE XR  
TAKE 4 TABLETS BY MOUTH EVERY DAY AS DIRECTED  
  
 metoprolol succinate 25 mg XL tablet Commonly known as:  TOPROL-XL Take 1 Tab by mouth daily. Prescriptions Sent to Pharmacy Refills  
 diclofenac EC (VOLTAREN) 50 mg EC tablet 0 Sig: Take 1 Tab by mouth two (2) times a day. Class: Normal  
 Pharmacy: [facility] [address] [place] [address] [address] AT [facility] [address] [address]\"" Ph #: [phone] Route: Oral  
 cyclobenzaprine (FLEXERIL) 10 mg tablet 0 Sig: Take 1 Tab by mouth three (3) times daily as needed for Muscle Spasm(s). Class: Normal  
 Pharmacy: [facility] [address] [place] [address] [address] AT [facility] of [address] (\[facility]\"" Ph #: [phone] Route: Oral  
  
Introducing [facility] & HEALTH SERVICES! Dear Frank Up: Thank you for requesting a [employer] account. Our records indicate that you already have an active S[employer] account.   You can access your account anytime at [email or web address] Did you know that you can access your hospital and ER discharge instructions at any time in The Start Project? You can also review all of your test results from your hospital stay or ER visit. Additional Information If you have questions, please visit the Frequently Asked Questions section of the The Start Project website at [email or web address] Remember, The Start Project is NOT to be used for urgent needs. For medical emergencies, dial 911. Now available from your iPhone and Android! Please provide this summary of care documentation to your next provider. Your primary care clinician is listed as Delmy Hickey. If you have any questions after today's visit, please call [phone].

## 2017-10-12 NOTE — PROGRESS NOTES
Reviewed record in preparation for visit and have obtained necessary documentation. Identified pt with two pt identifiers(name and ). Health Maintenance Due   Topic    DTaP/Tdap/Td series (1 - Tdap)    FOOT EXAM Q1     INFLUENZA AGE 9 TO ADULT          No chief complaint on file. Wt Readings from Last 3 Encounters:   10/12/17 293 lb (132.9 kg)   17 288 lb (130.6 kg)   17 300 lb (136.1 kg)     Temp Readings from Last 3 Encounters:   10/12/17 98.4 °F (36.9 °C) (Oral)   17 98 °F (36.7 °C)   17 98.9 °F (37.2 °C) (Oral)     BP Readings from Last 3 Encounters:   17 126/76   17 131/79   17 133/76     Pulse Readings from Last 3 Encounters:   17 66   17 72   17 62           Learning Assessment:  :     Learning Assessment 5/3/2017 2014   PRIMARY LEARNER Patient Patient   PRIMARY LANGUAGE ENGLISH ENGLISH   LEARNER PREFERENCE PRIMARY DEMONSTRATION DEMONSTRATION   ANSWERED BY self patient   RELATIONSHIP SELF SELF       Depression Screening:  :     PHQ over the last two weeks 5/3/2017   Little interest or pleasure in doing things Not at all   Feeling down, depressed or hopeless Not at all   Total Score PHQ 2 0       Fall Risk Assessment:  :     No flowsheet data found. Abuse Screening:  :     Abuse Screening Questionnaire 5/3/2017   Do you ever feel afraid of your partner? N   Are you in a relationship with someone who physically or mentally threatens you? N   Is it safe for you to go home? Y       Coordination of Care Questionnaire:  :     1) Have you been to an emergency room, urgent care clinic since your last visit?  yes   Hospitalized since your last visit? no             2) Have you seen or consulted any other health care providers outside of [address] [place] [address] since your last visit? no  (Include any pap smears or colon screenings in this section.)    3) Do you have an Advance Directive on file? no    4) Are you interested in receiving information on Advance Directives? YES      Patient is accompanied by self I have received verbal consent from Lindsey Jennings to discuss any/all medical information while they are present in the room.

## 2017-10-12 NOTE — TELEPHONE ENCOUNTER
Writer contacted patient with instruction per Janelle Eden and Dr. Isamar Dye, patient verbalized understanding.

## 2017-10-12 NOTE — TELEPHONE ENCOUNTER
Please let the patient know Dr. Florencio Kunz said it is okay to stop the toprol. Follow up if needed.  thanks

## 2017-12-03 RX ORDER — LOSARTAN POTASSIUM AND HYDROCHLOROTHIAZIDE 25; 100 MG/1; MG/1
TABLET ORAL
Qty: 30 TAB | Refills: 0 | Status: SHIPPED | OUTPATIENT
Start: 2017-12-03 | End: 2018-01-02 | Stop reason: SDUPTHER

## 2018-01-28 RX ORDER — EMPAGLIFLOZIN 25 MG/1
TABLET, FILM COATED ORAL
Qty: 30 TAB | Refills: 0 | Status: SHIPPED | OUTPATIENT
Start: 2018-01-28 | End: 2018-03-05 | Stop reason: SDUPTHER

## 2018-02-06 ENCOUNTER — OFFICE VISIT (OUTPATIENT)
Dept: INTERNAL MEDICINE CLINIC | Age: 58
End: 2018-02-06

## 2018-02-06 VITALS
OXYGEN SATURATION: 98 % | HEART RATE: 66 BPM | SYSTOLIC BLOOD PRESSURE: 132 MMHG | HEIGHT: 66 IN | RESPIRATION RATE: 16 BRPM | BODY MASS INDEX: 47.09 KG/M2 | WEIGHT: 293 LBS | DIASTOLIC BLOOD PRESSURE: 66 MMHG | TEMPERATURE: 97.9 F

## 2018-02-06 DIAGNOSIS — K62.5 RECTAL BLEEDING: ICD-10-CM

## 2018-02-06 LAB — HBA1C MFR BLD HPLC: 8 %

## 2018-02-06 NOTE — PROGRESS NOTES
Coordination of Care Questions    1. Have you been to the ER, urgent care clinic since your last visit? [facility] on Sunday for blood in stool       Hospitalized since your last visit? No    2. Have you seen or consulted any other health care providers outside of the [address] [place] [address] since your last visit? Include any pap smears or colon screening.  No

## 2018-02-06 NOTE — MR AVS SNAPSHOT
[address] [place] [address] [address] [facility] 57 
[phone] Patient: Modesto Leavitt MRN:  [id number] Visit Information Date & Time Provider Department Dept. Phone Encounter #  
 2/6/2018  4:00 PM Luis Manuel Nava, [address] [place] [address] [facility] [phone] [id number] Upcoming Health Maintenance Date Due DTaP/Tdap/Td series (1 - Tdap) 7/8/1981 FOOT EXAM Q1 6/21/2017 Influenza Age 5 to Adult 8/1/2017 HEMOGLOBIN A1C Q6M 2/18/2018 COLONOSCOPY 5/17/2018 MICROALBUMIN Q1 5/3/2018 LIPID PANEL Q1 5/3/2018 EYE EXAM RETINAL OR DILATED Q1 9/13/2018 Allergies as of 2/6/2018  Review Complete On: 2/6/2018 By: Pat Marmolejo LPN No Known Allergies Current Immunizations  Reviewed on 10/12/2015 Name Date  
 TB Skin Test (PPD) Intradermal 10/12/2015 ZZZ-RETIRED (DO NOT USE) Pneumococcal Vaccine (Unspecified Type) 8/2/2008 Not reviewed this visit You Were Diagnosed With   
  
 Codes Comments Uncontrolled type 2 diabetes mellitus without complication, with long-term current use of insulin ([facility]    -  Primary ICD-10-CM: E11.65, Z79.4 ICD-9-CM: 250.02, V58.67 Rectal bleeding     ICD-10-CM: K62.5 ICD-9-CM: 569.3 Vitals BP Pulse Temp Resp Height(growth percentile) Weight(growth percentile) 132/66 66 97.9 °F (36.6 °C) (Oral) 16 5' 6\" (1.676 m) 293 lb (132.9 kg) SpO2 BMI Smoking Status 98% 47.29 kg/m2 Never Smoker Vitals History BMI and BSA Data Body Mass Index Body Surface Area  
 47.29 kg/m 2 2.49 m 2 Preferred Pharmacy Pharmacy Name Phone [facility] 1 [address]  [facility] [phone] Your Updated Medication List  
  
   
This list is accurate as of: 2/6/18  4:43 PM.  Always use your most recent med list.  
  
  
  
  
 aspirin 81 mg tablet Take 81 mg by mouth daily. BD INSULIN PEN NEEDLE UF MINI 31 gauge x 3/16\" Ndle Generic drug:  Insulin Needles (Disposable) USE AS DIRECTED  
  
 cyclobenzaprine 10 mg tablet Commonly known as:  FLEXERIL Take 1 Tab by mouth three (3) times daily as needed for Muscle Spasm(s). diclofenac EC 50 mg EC tablet Commonly known as:  VOLTAREN Take 1 Tab by mouth two (2) times a day. glucose blood VI test strips strip Commonly known as:  FREESTYLE LITE STRIPS Use 2 times a day * insulin glargine 100 unit/mL (3 mL) Inpn Commonly known as:  LANTUS,BASAGLAR  
50 u  HS  
  
 * LANTUS SOLOSTAR 100 unit/mL (3 mL) Inpn Generic drug:  insulin glargine INJECT 40 UNITS UNDER THE SKIN EVERY NIGHT AT BEDTIME  
  
 JARDIANCE 25 mg tablet Generic drug:  empagliflozin TAKE 1 TABLET BY MOUTH DAILY losartan-hydroCHLOROthiazide 100-25 mg per tablet Commonly known as:  HYZAAR  
TAKE 1 TABLET EVERY DAY  
  
 lovastatin 40 mg tablet Commonly known as:  MEVACOR  
TAKE 1 TABLET BY MOUTH EVERY EVENING  
  
 metFORMIN  mg tablet Commonly known as:  GLUCOPHAGE XR  
TAKE 4 TABLETS BY MOUTH EVERY DAY AS DIRECTED  
  
 metoprolol succinate 25 mg XL tablet Commonly known as:  TOPROL-XL Take 1 Tab by mouth daily. * Notice: This list has 2 medication(s) that are the same as other medications prescribed for you. Read the directions carefully, and ask your doctor or other care provider to review them with you. We Performed the Following AMB POC HEMOGLOBIN A1C [86932 CPT(R)] REFERRAL TO GASTROENTEROLOGY [JCS62 Custom] Referral Information Referral ID Referred By Referred To  
  
 [id number] [facility] [address] [place] [address] [phone] Visits Status Start Date End Date 1 New Request 2/6/18 2/6/19  If your referral has a status of pending review or denied, additional information will be sent to support the outcome of this decision. Introducing [facility] & HEALTH SERVICES! Dear Franci Tilley: Thank you for requesting a [employer] account. Our records indicate that you already have an active [employer] account. You can access your account anytime at [email or web address] Did you know that you can access your hospital and ER discharge instructions at any time in [employer]? You can also review all of your test results from your hospital stay or ER visit. Additional Information If you have questions, please visit the Frequently Asked Questions section of the [employer] website at [email or web address] Remember, [employer] is NOT to be used for urgent needs. For medical emergencies, dial 911. Now available from your iPhone and Android! Please provide this summary of care documentation to your next provider. Your primary care clinician is listed as Eugenia Culver. If you have any questions after today's visit, please call [phone].

## 2018-02-06 NOTE — PROGRESS NOTES
Chief Complaint   Patient presents with    Rectal Bleeding     patient complains of blood in stool since sunday, birght red no constipation, no hx of hemorroids      Subjective:      Roula Anthony is an 62 y.o. male who was self-referred for evaluation of rectal bleeding. He has experienced some intermitten bleeding occurring 2 times per week. Bleeding is described as slight. Patient is having no rectal pain. Patient denies a personal history of colon cancer or IBD. Patient Active Problem List    Diagnosis Date Noted    Primary osteoarthritis of one knee 11/03/2015    Dyslipidemia, goal LDL below 100 08/11/2014    Type II diabetes mellitus, uncontrolled ([facility] 01/17/2012    Hypertension     Morbid obesity ([facility]     Diabetes ([facility]     Proteinuria     Other and unspecified disc disorder of lumbar region 03/30/2009      Results for orders placed or performed in visit on 02/06/18   AMB POC HEMOGLOBIN A1C   Result Value Ref Range    Hemoglobin A1c (POC) 8.0 %       Review of Systems  Cardiovascular: negative     Objective:     Vitals:    02/06/18 1605   BP: 132/66   Pulse: 66   Resp: 16   Temp: 97.9 °F (36.6 °C)   TempSrc: Oral   SpO2: 98%   Weight: 293 lb (132.9 kg)   Height: 5' 6\" (1.676 m)       General:  alert, cooperative, no distress, appears stated age   Abdomen: soft, nontender, nondistended, no masses or organomegaly  RECTAL EXAM: negative without mass, lesions or tenderness. Rectal:   smooth, regular     Assessment/Plan:     bleeding hemorroids  Or tear likely    1. Discussed the options of observation, medical management and surgery. I discussed  in detail and the complications related to the operation and the anesthesia. The patient understands the risks, any and all questions were answered to the patient's satisfaction. 2. Patient has elected to proceed with surgical treatment. Procedure will be scheduled. Diagnoses and all orders for this visit:    1.  Uncontrolled type 2 diabetes mellitus without complication, with long-term current use of insulin (HCC)  -     AMB POC HEMOGLOBIN A1C    2.  Rectal bleeding  -     REFERRAL TO GASTROENTEROLOGY    he should not strain  Due 10 year colon

## 2018-05-10 ENCOUNTER — DOCUMENTATION ONLY (OUTPATIENT)
Dept: INTERNAL MEDICINE CLINIC | Age: 58
End: 2018-05-10

## 2018-05-10 NOTE — PROGRESS NOTES
Faxed Physician's recommendation for nutrition and exercise program to [facility] [phone]. Confirmation of fax received.

## 2018-07-09 ENCOUNTER — OFFICE VISIT (OUTPATIENT)
Dept: INTERNAL MEDICINE CLINIC | Age: 58
End: 2018-07-09

## 2018-07-09 VITALS
OXYGEN SATURATION: 97 % | TEMPERATURE: 98.6 F | HEART RATE: 57 BPM | BODY MASS INDEX: 46.45 KG/M2 | SYSTOLIC BLOOD PRESSURE: 120 MMHG | RESPIRATION RATE: 16 BRPM | HEIGHT: 66 IN | WEIGHT: 289 LBS | DIASTOLIC BLOOD PRESSURE: 76 MMHG

## 2018-07-09 DIAGNOSIS — I10 ESSENTIAL HYPERTENSION: ICD-10-CM

## 2018-07-09 DIAGNOSIS — E11.8 TYPE 2 DIABETES MELLITUS WITH COMPLICATION, UNSPECIFIED WHETHER LONG TERM INSULIN USE: Primary | ICD-10-CM

## 2018-07-09 DIAGNOSIS — N06.9 ISOLATED PROTEINURIA WITH MORPHOLOGIC LESION: ICD-10-CM

## 2018-07-09 DIAGNOSIS — L98.9 SKIN LESION OF FACE: ICD-10-CM

## 2018-07-09 LAB — HBA1C MFR BLD HPLC: NORMAL %

## 2018-07-09 NOTE — MR AVS SNAPSHOT
[address] [place] [address] [address] [facility] 57 
[phone] Patient: Aidan Small MRN:  [id number] Visit Information Date & Time Provider Department Dept. Phone Encounter #  
 7/9/2018  4:15 PM Corry Ramirez, [address] [place] [address] [facility] [phone] [id number] Upcoming Health Maintenance Date Due DTaP/Tdap/Td series (1 - Tdap) 7/8/1981 FOOT EXAM Q1 6/21/2017 MICROALBUMIN Q1 5/3/2018 LIPID PANEL Q1 5/3/2018 HEMOGLOBIN A1C Q6M 8/6/2018 Influenza Age 5 to Adult 8/1/2018 EYE EXAM RETINAL OR DILATED Q1 9/13/2018 COLONOSCOPY 2/26/2028 Allergies as of 7/9/2018  Review Complete On: 7/9/2018 By: Benigno Urrutia No Known Allergies Current Immunizations  Reviewed on 10/12/2015 Name Date  
 TB Skin Test (PPD) Intradermal 10/12/2015 ZZZ-RETIRED (DO NOT USE) Pneumococcal Vaccine (Unspecified Type) 8/2/2008 Not reviewed this visit You Were Diagnosed With   
  
 Codes Comments Type 2 diabetes mellitus with complication, unspecified whether long term insulin use (HCC)    -  Primary ICD-10-CM: E11.8 ICD-9-CM: 250.90 Isolated proteinuria with morphologic lesion     ICD-10-CM: N06.9 ICD-9-CM: 583.9 Essential hypertension     ICD-10-CM: I10 
ICD-9-CM: 401.9 Skin lesion of face     ICD-10-CM: L98.9 ICD-9-CM: 709.9 Vitals BP Pulse Temp Resp Height(growth percentile) Weight(growth percentile) 120/76 (!) 57 98.6 °F (37 °C) (Oral) 16 5' 6\" (1.676 m) 289 lb (131.1 kg) SpO2 BMI Smoking Status 97% 46.65 kg/m2 Never Smoker Vitals History BMI and BSA Data Body Mass Index Body Surface Area  
 46.65 kg/m 2 2.47 m 2 Preferred Pharmacy Pharmacy Name Phone [facility] 1 [address] AT 1 [facility] [phone] Your Updated Medication List  
  
   
 This list is accurate as of 18  4:28 PM.  Always use your most recent med list.  
  
  
  
  
 aspirin 81 mg tablet Take 81 mg by mouth daily. BD ULTRA-FINE MINI PEN NEEDLE 31 gauge x 3/16\" Ndle Generic drug:  Insulin Needles (Disposable) USE AS DIRECTED  
  
 cyclobenzaprine 10 mg tablet Commonly known as:  FLEXERIL Take 1 Tab by mouth three (3) times daily as needed for Muscle Spasm(s). diclofenac EC 50 mg EC tablet Commonly known as:  VOLTAREN Take 1 Tab by mouth two (2) times a day. glucose blood VI test strips strip Commonly known as:  FREESTYLE LITE STRIPS Use 2 times a day  
  
 insulin glargine-lixisenatide 100 unit-33 mcg/mL Inpn Commonly known as:  SOLIQUA 100/33  
40 Units by SubCUTAneous route Daily (before breakfast). JARDIANCE 25 mg tablet Generic drug:  empagliflozin TAKE 1 TABLET BY MOUTH DAILY losartan-hydroCHLOROthiazide 100-25 mg per tablet Commonly known as:  HYZAAR  
TAKE 1 TABLET EVERY DAY  
  
 lovastatin 40 mg tablet Commonly known as:  MEVACOR  
TAKE 1 TABLET BY MOUTH EVERY EVENING  
  
 metFORMIN  mg tablet Commonly known as:  GLUCOPHAGE XR  
TAKE 4 TABLETS BY MOUTH EVERY DAY AS DIRECTED Prescriptions Printed Refills  
 insulin glargine-lixisenatide (SOLIQUA 100/33) 100 unit-33 mcg/mL inpn 5 Si Units by SubCUTAneous route Daily (before breakfast). Class: Print Route: SubCUTAneous We Performed the Following AMB POC HEMOGLOBIN A1C [42713 CPT(R)] AMB POC URINALYSIS DIP STICK AUTO W/O MICRO [61848 CPT(R)] CBC WITH AUTOMATED DIFF [96646 CPT(R)] LIPID PANEL [06721 CPT(R)] METABOLIC PANEL, COMPREHENSIVE [28171 CPT(R)] MICROALBUMIN, UR, RAND W/ MICROALB/CREAT RATIO P3774032 CPT(R)] REFERRAL TO DERMATOLOGY [REF19 Custom] Referral Information  Referral ID Referred By Referred To  
  
 [id number] Mayito Garza MD   
 [facility] [place] [address] Phone: [phone] Fax: [phone] Visits Status Start Date End Date 1 New Request 7/9/18 7/9/19 If your referral has a status of pending review or denied, additional information will be sent to support the outcome of this decision. Introducing [facility] & HEALTH SERVICES! Dear Franca Khan: Thank you for requesting a [employer] account. Our records indicate that you already have an active Implisit account. You can access your account anytime at [email or web address] Did you know that you can access your hospital and ER discharge instructions at any time in Implisit? You can also review all of your test results from your hospital stay or ER visit. Additional Information If you have questions, please visit the Frequently Asked Questions section of the Implisit website at [email or web address] Remember, Implisit is NOT to be used for urgent needs. For medical emergencies, dial 911. Now available from your iPhone and Android! Please provide this summary of care documentation to your next provider. Your primary care clinician is listed as Elieser Clark. If you have any questions after today's visit, please call [phone].

## 2018-07-09 NOTE — PROGRESS NOTES
1. Have you been to the ER, urgent care clinic since your last visit? Hospitalized since your last visit? No    2. Have you seen or consulted any other health care providers outside of the [address] [place] [address] since your last visit? Include any pap smears or colon screening.  No   Chief Complaint   Patient presents with    Hypertension     follow up    Diabetes     follow up     Not fasting

## 2018-07-09 NOTE — PROGRESS NOTES
SUBJECTIVE: Krishna Leach is a 62 y.o. male seen for a follow up visit; he has diabetes, hypertension and hyperlipidemia. Current Outpatient Prescriptions   Medication Sig Dispense Refill    metFORMIN ER (GLUCOPHAGE XR) 500 mg tablet TAKE 4 TABLETS BY MOUTH EVERY DAY AS DIRECTED 120 Tab 5    JARDIANCE 25 mg tablet TAKE 1 TABLET BY MOUTH DAILY 30 Tab 11    losartan-hydroCHLOROthiazide (HYZAAR) 100-25 mg per tablet TAKE 1 TABLET EVERY DAY 30 Tab 5    LANTUS SOLOSTAR 100 unit/mL (3 mL) inpn INJECT 40 UNITS UNDER THE SKIN EVERY NIGHT AT BEDTIME 15 mL 5    BD INSULIN PEN NEEDLE UF MINI 31 gauge x 3/16\" ndle USE AS DIRECTED 100 Pen Needle 11    diclofenac EC (VOLTAREN) 50 mg EC tablet Take 1 Tab by mouth two (2) times a day. 14 Tab 0    lovastatin (MEVACOR) 40 mg tablet TAKE 1 TABLET BY MOUTH EVERY EVENING 90 Tab 3    glucose blood VI test strips (FREESTYLE LITE STRIPS) strip Use 2 times a day 100 Strip 5    aspirin 81 mg tablet Take 81 mg by mouth daily.  cyclobenzaprine (FLEXERIL) 10 mg tablet Take 1 Tab by mouth three (3) times daily as needed for Muscle Spasm(s). 21 Tab 0     Patient Active Problem List   Diagnosis Code    Hypertension I10    Morbid obesity ([address] E66.01    Diabetes ([address] E11.9    Proteinuria R80.9    Other and unspecified disc disorder of lumbar region M51.9    Type II diabetes mellitus, uncontrolled ([address] E11.65    Dyslipidemia, goal LDL below 100 E78.5    Primary osteoarthritis of one knee M17.10     System Review: Cardiovascular ROS - taking medications as instructed, no medication side effects noted, no TIA's, no chest pain on exertion, no dyspnea on exertion, no swelling of ankles, CNS ROS - no TIA or stroke-like symptoms, no amaurosis, diplopia, abnormal speech, unilateral numbness or weakness. New concerns: 160 to 95 readings at home.    Then normal at [facility] for   Training    Abnormal skin lesion    OBJECTIVE:  Visit Vitals    /62 (BP 1 Location: Left arm, BP Patient Position: At rest)    Pulse (!) 57    Temp 98.6 °F (37 °C) (Oral)    Resp 16    Ht 5' 6\" (1.676 m)    Wt 289 lb (131.1 kg)    SpO2 97%    BMI 46.65 kg/m2      Appearance: alert, well appearing, and in no distress and overweight. General exam: CVS exam BP noted to be well controlled today in office, S1, S2 normal, no gallop, no murmur, chest clear, no JVD, no HSM, no edema. Lab review: labs reviewed, I note that glycosylated hemoglobin   Results for orders placed or performed in visit on 07/09/18   AMB POC HEMOGLOBIN A1C   Result Value Ref Range    Hemoglobin A1c (POC)  %   unchanged  , orders written for new lab studies as appropriate; see orders. ASSESSMENT:  diabetes borderline controlled, hypertension well controlled. PLAN:  lab results and schedule of future lab studies reviewed with patient  reviewed medications and side effects in detail. Diagnoses and all orders for this visit:    1. Type 2 diabetes mellitus with complication, unspecified whether long term insulin use (HCC)  -     AMB POC HEMOGLOBIN A1C  -     CBC WITH AUTOMATED DIFF  -     LIPID PANEL  -     METABOLIC PANEL, COMPREHENSIVE  -     AMB POC URINALYSIS DIP STICK AUTO W/O MICRO  -     MICROALBUMIN, UR, RAND W/ MICROALB/CREAT RATIO    2. Isolated proteinuria with morphologic lesion    3. Essential hypertension    4. Skin lesion of face  -     [facility] ref [facility]    Other orders  -     insulin glargine-lixisenatide (SOLIQUA 100/33) 100 unit-33 mcg/mL inpn; 40 Units by SubCUTAneous route Daily (before breakfast).       Change to soliqua  For double effect    Reviewed techniques  With pharmacist

## 2018-07-09 NOTE — PROGRESS NOTES
Met with patient during Dr. Teofilo Roque visit to discuss starting [place]. Reviewed the following with patient:  · Common adverse effects  · Eat small meals more frequently and stop eating when he feels full to decrease feelings of nausea/vomiting  · Discussed mechanism of action and how it can be beneficial on both blood sugars and weight   · Reviewed how administration technique is the same as with Lantus    Patient verbalized understanding of information presented. Answered all of the patient's questions.      Diogo Wesley, PharmD, Rojelio Gifford

## 2018-07-13 ENCOUNTER — PATIENT OUTREACH (OUTPATIENT)
Dept: INTERNAL MEDICINE CLINIC | Age: 58
End: 2018-07-13

## 2018-07-13 NOTE — PROGRESS NOTES
Dr. Danilo Carrera referred pt to this NN/CDE for diabetes education. Contacted the pt and scheduled an appointment for an education session on 7/26 @ 8 AM. The pt requested to focus on nutrition.

## 2018-07-14 LAB
ALBUMIN SERPL-MCNC: 4.1 G/DL (ref 3.5–5.5)
ALBUMIN/CREAT UR: 3.3 MG/G CREAT (ref 0–30)
ALBUMIN/GLOB SERPL: 1.6 {RATIO} (ref 1.2–2.2)
ALP SERPL-CCNC: 80 IU/L (ref 39–117)
ALT SERPL-CCNC: 19 IU/L (ref 0–44)
AST SERPL-CCNC: 20 IU/L (ref 0–40)
BASOPHILS # BLD AUTO: 0 X10E3/UL (ref 0–0.2)
BASOPHILS NFR BLD AUTO: 0 %
BILIRUB SERPL-MCNC: 0.3 MG/DL (ref 0–1.2)
BUN SERPL-MCNC: 15 MG/DL (ref 6–24)
BUN/CREAT SERPL: 16 (ref 9–20)
CALCIUM SERPL-MCNC: 9.3 MG/DL (ref 8.7–10.2)
CHLORIDE SERPL-SCNC: 103 MMOL/L (ref 96–106)
CHOLEST SERPL-MCNC: 154 MG/DL (ref 100–199)
CO2 SERPL-SCNC: 19 MMOL/L (ref 20–29)
CREAT SERPL-MCNC: 0.94 MG/DL (ref 0.76–1.27)
CREAT UR-MCNC: 122.6 MG/DL
EOSINOPHIL # BLD AUTO: 0.2 X10E3/UL (ref 0–0.4)
EOSINOPHIL NFR BLD AUTO: 3 %
ERYTHROCYTE [DISTWIDTH] IN BLOOD BY AUTOMATED COUNT: 15.3 % (ref 12.3–15.4)
GLOBULIN SER CALC-MCNC: 2.5 G/DL (ref 1.5–4.5)
GLUCOSE SERPL-MCNC: 99 MG/DL (ref 65–99)
HCT VFR BLD AUTO: 44.9 % (ref 37.5–51)
HDLC SERPL-MCNC: 55 MG/DL
HGB BLD-MCNC: 14.7 G/DL (ref 13–17.7)
IMM GRANULOCYTES # BLD: 0 X10E3/UL (ref 0–0.1)
IMM GRANULOCYTES NFR BLD: 0 %
INTERPRETATION, 910389: NORMAL
LDLC SERPL CALC-MCNC: 88 MG/DL (ref 0–99)
LYMPHOCYTES # BLD AUTO: 2.1 X10E3/UL (ref 0.7–3.1)
LYMPHOCYTES NFR BLD AUTO: 31 %
Lab: NORMAL
MCH RBC QN AUTO: 26.8 PG (ref 26.6–33)
MCHC RBC AUTO-ENTMCNC: 32.7 G/DL (ref 31.5–35.7)
MCV RBC AUTO: 82 FL (ref 79–97)
MICROALBUMIN UR-MCNC: 4 UG/ML
MONOCYTES # BLD AUTO: 0.5 X10E3/UL (ref 0.1–0.9)
MONOCYTES NFR BLD AUTO: 8 %
NEUTROPHILS # BLD AUTO: 3.9 X10E3/UL (ref 1.4–7)
NEUTROPHILS NFR BLD AUTO: 58 %
PLATELET # BLD AUTO: 271 X10E3/UL (ref 150–379)
POTASSIUM SERPL-SCNC: 4 MMOL/L (ref 3.5–5.2)
PROT SERPL-MCNC: 6.6 G/DL (ref 6–8.5)
RBC # BLD AUTO: 5.48 X10E6/UL (ref 4.14–5.8)
SODIUM SERPL-SCNC: 142 MMOL/L (ref 134–144)
TRIGL SERPL-MCNC: 54 MG/DL (ref 0–149)
VLDLC SERPL CALC-MCNC: 11 MG/DL (ref 5–40)
WBC # BLD AUTO: 6.8 X10E3/UL (ref 3.4–10.8)

## 2018-07-16 RX ORDER — LOVASTATIN 40 MG/1
TABLET ORAL
Qty: 90 TAB | Refills: 0 | Status: SHIPPED | OUTPATIENT
Start: 2018-07-16 | End: 2018-10-06 | Stop reason: SDUPTHER

## 2018-07-16 RX ORDER — LOSARTAN POTASSIUM AND HYDROCHLOROTHIAZIDE 25; 100 MG/1; MG/1
TABLET ORAL
Qty: 30 TAB | Refills: 0 | Status: SHIPPED | OUTPATIENT
Start: 2018-07-16 | End: 2018-08-12 | Stop reason: SDUPTHER

## 2018-07-25 ENCOUNTER — OFFICE VISIT (OUTPATIENT)
Dept: DERMATOLOGY | Facility: AMBULATORY SURGERY CENTER | Age: 58
End: 2018-07-25

## 2018-07-25 VITALS
HEIGHT: 66 IN | TEMPERATURE: 98 F | OXYGEN SATURATION: 98 % | DIASTOLIC BLOOD PRESSURE: 80 MMHG | HEART RATE: 68 BPM | WEIGHT: 287 LBS | BODY MASS INDEX: 46.12 KG/M2 | SYSTOLIC BLOOD PRESSURE: 106 MMHG | RESPIRATION RATE: 18 BRPM

## 2018-07-25 DIAGNOSIS — L81.0 POST-INFLAMMATORY HYPERPIGMENTATION: ICD-10-CM

## 2018-07-25 DIAGNOSIS — L82.1 OTHER SEBORRHEIC KERATOSIS: ICD-10-CM

## 2018-07-25 DIAGNOSIS — L85.3 DRY SKIN: Primary | ICD-10-CM

## 2018-07-25 NOTE — PROGRESS NOTES
Written by Cristhian Cintron, as dictated by Daniella High, [address]. Name: Bud Cowden       Age: 62 y.o. Date: 7/25/2018    Chief Complaint:   Chief Complaint   Patient presents with    Skin Exam     new patient has area on right fore head comes and goes       Subjective:    HPI:  Mr.. Bud Cowden is a 62 y.o. male who presents for the evaluation of a lesion on the left upper forehead. The patient was referred by Dr. Radha Elizondo for this evaluation. He states that the lesion appeared 2 months ago. The patient has not had prior treatment for this lesion. Associated symptoms include persistent rough lesion that waxes and wanes. The lesion is not present today. He states he has no personal history of skin cancer or skin problems. ROS: Consitutional: Negative  Dermatological : negative    Social History     Social History    Marital status: SINGLE     Spouse name: N/A    Number of children: N/A    Years of education: N/A     Occupational History    Not on file.      Social History Main Topics    Smoking status: Never Smoker    Smokeless tobacco: Never Used    Alcohol use No    Drug use: No    Sexual activity: Not Currently     Other Topics Concern    Not on file     Social History Narrative       Family History   Problem Relation Age of Onset    Cancer Mother     Cancer Father     Heart Disease Father        Past Medical History:   Diagnosis Date    [facility]     Hypercholesterolemia     Hypertension     Morbid obesity ([facility]     Proteinuria        Past Surgical History:   Procedure Laterality Date    ENDOSCOPY, COLON, DIAGNOSTIC      HX APPENDECTOMY         Current Outpatient Prescriptions   Medication Sig Dispense Refill    losartan-hydroCHLOROthiazide (HYZAAR) 100-25 mg per tablet TAKE 1 TABLET EVERY DAY 30 Tab 0    lovastatin (MEVACOR) 40 mg tablet TAKE 1 TABLET BY MOUTH EVERY EVENING 90 Tab 0    insulin glargine-lixisenatide (SOLIQUA 100/33) 100 unit-33 mcg/mL inpn 40 Units by SubCUTAneous route Daily (before breakfast). 5 Syringe 5    metFORMIN ER (GLUCOPHAGE XR) 500 mg tablet TAKE 4 TABLETS BY MOUTH EVERY DAY AS DIRECTED 120 Tab 5    JARDIANCE 25 mg tablet TAKE 1 TABLET BY MOUTH DAILY 30 Tab 11    BD INSULIN PEN NEEDLE UF MINI 31 gauge x 3/16\" ndle USE AS DIRECTED 100 Pen Needle 11    glucose blood VI test strips (FREESTYLE LITE STRIPS) strip Use 2 times a day 100 Strip 5    aspirin 81 mg tablet Take 81 mg by mouth daily. No Known Allergies      Objective:    Visit Vitals    /80 (BP 1 Location: Left arm, BP Patient Position: Sitting)    Pulse 68    Temp 98 °F (36.7 °C) (Oral)    Resp 18    Ht 5' 6\" (1.676 m)    Wt 287 lb (130.2 kg)    SpO2 98%    BMI 46.32 kg/m2       Jaspal Skinner is a 62 y.o. male who appears well and in no distress. He is awake, alert, and oriented. There is no preauricular, submandibular, or cervical lymphadenopathy. A limited skin examination was completed including his upper forehead. He has no present lesions in the area of concern on his upper right forehead. He has mild dry skin and post inflammatory hyperpigmentation and a seborrheic keratosis on his left upper forehead. Assessment/Plan:    1. Dry skin, upper forehead. The diagnosis was reviewed and the patient was reassured that no treatment is needed. I recommended the use of moisturizer in this area. I also recommended he make an appointment when the lesion reappears. 2. Seborrheic keratoses. The diagnosis was reviewed and the patient was reassured that no treatment is needed for these benign lesions. If lesion recurs - he will call for re-evaluation. This plan was reviewed with the patient and patient agrees. All questions were answered. This scribe documentation was reviewed by me and accurately reflects the examination and decisions made by me.

## 2018-07-25 NOTE — PROGRESS NOTES
Chief Complaint   Patient presents with    Skin Exam     new patient has area on right fore head comes and goes       1. Have you been to the ER, urgent care clinic since your last visit? Hospitalized since your last visit? no    2. Have you seen or consulted any other health care providers outside of the [address] [place] [address] since your last visit? Include any pap smears or colon screening.   no

## 2018-07-26 ENCOUNTER — PATIENT OUTREACH (OUTPATIENT)
Dept: INTERNAL MEDICINE CLINIC | Age: 58
End: 2018-07-26

## 2018-07-31 ENCOUNTER — OFFICE VISIT (OUTPATIENT)
Dept: INTERNAL MEDICINE CLINIC | Age: 58
End: 2018-07-31

## 2018-07-31 VITALS
OXYGEN SATURATION: 97 % | HEART RATE: 73 BPM | SYSTOLIC BLOOD PRESSURE: 124 MMHG | BODY MASS INDEX: 45.32 KG/M2 | HEIGHT: 66 IN | WEIGHT: 282 LBS | RESPIRATION RATE: 20 BRPM | TEMPERATURE: 98.2 F | DIASTOLIC BLOOD PRESSURE: 64 MMHG

## 2018-07-31 DIAGNOSIS — Z23 ENCOUNTER FOR IMMUNIZATION: ICD-10-CM

## 2018-07-31 DIAGNOSIS — S80.811A ABRASION OF RIGHT LOWER EXTREMITY, INITIAL ENCOUNTER: Primary | ICD-10-CM

## 2018-07-31 NOTE — PROGRESS NOTES
HISTORY OF PRESENT ILLNESS  Mandi Fraire is a 62 y.o. male. HPI  Patient presents to the office for evaluation of his right lower leg. He states on Saturday he stepped up on a ledge to take a picture and slipped and scraped his leg. He states he stepped up with his weaker leg and it gave out. He reports he cleaned the area well with peroxide. He is here today because the area is still sore with activity. He is not sure if he is up to date with his tetanus or not. Review of Systems   Skin:        Abrasion right lowe leg. Blood pressure 124/64, pulse 73, temperature 98.2 °F (36.8 °C), temperature source Oral, resp. rate 20, height 5' 6\" (1.676 m), weight 282 lb (127.9 kg), SpO2 97 %. Physical Exam   Skin:   Right lower leg just below the knee- superficial appearing abrasion with some mild erythema around the area. No current drainage. Some scabbing noted. Tender to palpation around the area. No edema noted at this time. ASSESSMENT and PLAN  Diagnoses and all orders for this visit:    1. Abrasion of right lower extremity, initial encounter    I explained to the patient that the area does not appear to be infected. I suspect the soreness is due to the injury itself. I have advised him to keep it clean and dry. He may use light covering when out and about but remove when at home. We discussed signs and symptoms of infection and he was advised to contact the office if any concerns. Today the patient will get his TDap. All these things were discussed with the patient and he understands and agrees.

## 2018-07-31 NOTE — MR AVS SNAPSHOT
[address] [place] [address] [address] [facility] 
[phone] Patient: Amber Carreno MRN:  [id number] Visit Information Date & Time Provider Department Dept. Phone Encounter #  
 7/31/2018  7:50 AM Daniel Urbina PA-C Replaced by [facility] [phone] [id number] Your Appointments 10/18/2018  8:15 AM  
ROUTINE CARE with Florence Darnell MD  
Replaced by [facility] Appt Note: R F/U  
 [place] [address] [facility] [address]  
[address] [facility] [address] [facility] 7 [address] Upcoming Health Maintenance Date Due DTaP/Tdap/Td series (1 - Tdap) 7/8/1981 FOOT EXAM Q1 6/21/2017 Influenza Age 5 to Adult 8/1/2018 EYE EXAM RETINAL OR DILATED Q1 9/13/2018 HEMOGLOBIN A1C Q6M 1/9/2019 MICROALBUMIN Q1 7/13/2019 LIPID PANEL Q1 7/13/2019 COLONOSCOPY 2/26/2028 Allergies as of 7/31/2018  Review Complete On: 7/31/2018 By: Daniel Urbina PA-C No Known Allergies Current Immunizations  Reviewed on 10/12/2015 Name Date  
 TB Skin Test (PPD) Intradermal 10/12/2015 ZZZ-RETIRED (DO NOT USE) Pneumococcal Vaccine (Unspecified Type) 8/2/2008 Not reviewed this visit You Were Diagnosed With   
  
 Codes Comments Abrasion of right lower extremity, initial encounter    -  Primary ICD-10-CM: L12.585E ICD-9-CM: 916.0 Vitals BP Pulse Temp Resp Height(growth percentile) Weight(growth percentile) 124/64 73 98.2 °F (36.8 °C) (Oral) 20 5' 6\" (1.676 m) 282 lb (127.9 kg) SpO2 BMI Smoking Status 97% 45.52 kg/m2 Never Smoker BMI and BSA Data Body Mass Index Body Surface Area 45.52 kg/m 2 2.44 m 2 Preferred Pharmacy Pharmacy Name Phone  [address] [place], [place] [address] AT [address] [place] [address] [address] [phone] Your Updated Medication List  
  
   
This list is accurate as of 7/31/18  8:15 AM.  Always use your most recent med list.  
  
  
  
  
 aspirin 81 mg tablet Take 81 mg by mouth daily. BD ULTRA-FINE MINI PEN NEEDLE 31 gauge x 3/16\" Ndle Generic drug:  Insulin Needles (Disposable) USE AS DIRECTED  
  
 glucose blood VI test strips strip Commonly known as:  FREESTYLE LITE STRIPS Use 2 times a day  
  
 insulin glargine-lixisenatide 100 unit-33 mcg/mL Inpn Commonly known as:  SOLIQUA 100/33  
40 Units by SubCUTAneous route Daily (before breakfast). JARDIANCE 25 mg tablet Generic drug:  empagliflozin TAKE 1 TABLET BY MOUTH DAILY losartan-hydroCHLOROthiazide 100-25 mg per tablet Commonly known as:  HYZAAR  
TAKE 1 TABLET EVERY DAY  
  
 lovastatin 40 mg tablet Commonly known as:  MEVACOR  
TAKE 1 TABLET BY MOUTH EVERY EVENING  
  
 metFORMIN  mg tablet Commonly known as:  GLUCOPHAGE XR  
TAKE 4 TABLETS BY MOUTH EVERY DAY AS DIRECTED Introducing [facility] & HEALTH SERVICES! Dear Lilly Mcneil: Thank you for requesting a Ludia account. Our records indicate that you already have an active Ludia account. You can access your account anytime at [email or web address] ([email or web address])/[email or web address] Patients Did you know that you can access your hospital and ER discharge instructions at any time in Ludia? You can also review all of your test results from your hospital stay or ER visit. Additional Information If you have questions, please visit the Frequently Asked Questions section of the Ludia website at [email or web address] ([email or web address])/[email or web address] Remember, Ludia is NOT to be used for urgent needs. For medical emergencies, dial 911. Now available from your iPhone and Android! Please provide this summary of care documentation to your next provider. Your primary care clinician is listed as Kyra Zabala.  If you have any questions after today's visit, please call [phone].

## 2018-08-12 RX ORDER — LOSARTAN POTASSIUM AND HYDROCHLOROTHIAZIDE 25; 100 MG/1; MG/1
TABLET ORAL
Qty: 30 TAB | Refills: 5 | Status: SHIPPED | OUTPATIENT
Start: 2018-08-12 | End: 2019-03-18 | Stop reason: SDUPTHER

## 2018-08-27 ENCOUNTER — PATIENT OUTREACH (OUTPATIENT)
Dept: INTERNAL MEDICINE CLINIC | Age: 58
End: 2018-08-27

## 2018-08-27 NOTE — PROGRESS NOTES
Goals      Pt will have HgbA1C <7 (pt-stated)            8/27/18: Recent AM BG readings  & -170. Reviewed goals with patient & encouraged him that he is on track. Plan to f/u with patient at appointment on 10/18. -SRW    7/26/18: Pt checks BG BID, in the AM & later in the day either fasting or PPG. This AM BG was 134. Reviewed current HgbA1C & goal of <7. Reviewed target FBG & PPG readings. Patient was given a brochure \"Type 2 Diabetes\" that lists these values. He reports compliance with Soliqua & metformin. Plan to contact pt in one month for f/u. He states a plan to contact this NN with questions or concerns & was given direct contact information. -SRW       Pt will limit meals to 45 g CHO & snacks to 15 g CHO (pt-stated)            8/27/18: Pt has been limiting CHO intake. Reviewed goal to keep meals within 45 grams and snacks within 15 grams. If he is not seeing weight loss results, he can decrease meals to 35-40 grams and snacks to 10 grams. He is getting tired of eating the same foods. B: eggs & lopez or cereal or oatmeal. L: chicken & veggies with fruit or salad. D: meat & veggies or wheat pasta w/ meat & veggies or small personal pizza. E-mailed him a list of meal ideas from [employer]'s website. He has decreased intake of sweets & increased intake of fruits & veggies. He is exercising at least 4 days per week using the elliptical or treadmill & lifting weights. Plan to f/u with patient at appointment 10/18. -SRW    7/26/2018: Met with patient to provide diabetes education. Reviewed CHO counting, per patient's request to focus only on CHO counting. Reviewed how to read a nutrition label and differentiating food groups. Patient demonstrated correct CHO counting with sample meals and stated a goal to limit meals to 45 grams of CHO and snacks to 15 grams of CHO. He is currently in the [facility] program & has been instructed to eat a snack between breakfast & lunch and between lunch & dinner.  He is exercising with this program. Provided the patient with the following educational materials: \"Planning Healthy Meals\", \"7 Days of Healthy Meals\", and \"Type 2 Diabetes\". Plan to contact patient in one month for f/u. He was given this NN's direct contact information & states a plan to call with questions or concerns.  -ROMARIO

## 2018-08-31 ENCOUNTER — TELEPHONE (OUTPATIENT)
Dept: INTERNAL MEDICINE CLINIC | Age: 58
End: 2018-08-31

## 2018-08-31 NOTE — TELEPHONE ENCOUNTER
Writer received denial for Corefino. Patient will need to try the following formulary medications. METFORMIN HCL ER TABS 500MG   METFORMIN HCL ER TABS 750MG   BYETTA INJECT/PEN 5MCG . BYETTA INJECT/PEN 10MCG . LANTUS SOLOSTAR PEN 100U/ML   LEVEMIR FLEXTOUCH PEN 3ML 5'S 100U/ML   BYDUREON PEN INJECTOR 4'S 2MG   TRULICITY SD PEN 6.4YQ 4'S 0. 0.75MG    Please advise.

## 2018-08-31 NOTE — TELEPHONE ENCOUNTER
Writer has sent letter to patient with a discount card for the [facility] [employer] to help with purchasing his medication.

## 2018-10-03 ENCOUNTER — OFFICE VISIT (OUTPATIENT)
Dept: INTERNAL MEDICINE CLINIC | Age: 58
End: 2018-10-03

## 2018-10-03 VITALS
RESPIRATION RATE: 18 BRPM | TEMPERATURE: 96.1 F | BODY MASS INDEX: 45 KG/M2 | HEIGHT: 66 IN | DIASTOLIC BLOOD PRESSURE: 75 MMHG | HEART RATE: 70 BPM | OXYGEN SATURATION: 96 % | WEIGHT: 280 LBS | SYSTOLIC BLOOD PRESSURE: 110 MMHG

## 2018-10-03 DIAGNOSIS — T50.905A DRUG-INDUCED NAUSEA AND VOMITING: ICD-10-CM

## 2018-10-03 DIAGNOSIS — E11.65 UNCONTROLLED TYPE 2 DIABETES MELLITUS WITH HYPERGLYCEMIA (HCC): Primary | ICD-10-CM

## 2018-10-03 DIAGNOSIS — R11.2 DRUG-INDUCED NAUSEA AND VOMITING: ICD-10-CM

## 2018-10-03 LAB — HBA1C MFR BLD HPLC: 7.1 %

## 2018-10-03 RX ORDER — METFORMIN HYDROCHLORIDE 500 MG/1
1500 TABLET, EXTENDED RELEASE ORAL
Qty: 120 TAB | Refills: 5
Start: 2018-10-03 | End: 2020-01-02

## 2018-10-03 NOTE — PROGRESS NOTES
SUBJECTIVE: Teresita Bliss is a 62 y.o. male seen for a follow up visit; he has diabetes, hypertension and hyperlipidemia. Current Outpatient Prescriptions Medication Sig Dispense Refill  losartan-hydroCHLOROthiazide (HYZAAR) 100-25 mg per tablet TAKE 1 TABLET EVERY DAY 30 Tab 5  lovastatin (MEVACOR) 40 mg tablet TAKE 1 TABLET BY MOUTH EVERY EVENING 90 Tab 0  
 insulin glargine-lixisenatide (SOLIQUA 100/33) 100 unit-33 mcg/mL inpn 40 Units by SubCUTAneous route Daily (before breakfast). 5 Syringe 5  
 metFORMIN ER (GLUCOPHAGE XR) 500 mg tablet TAKE 4 TABLETS BY MOUTH EVERY DAY AS DIRECTED 120 Tab 5  JARDIANCE 25 mg tablet TAKE 1 TABLET BY MOUTH DAILY 30 Tab 11  
 BD INSULIN PEN NEEDLE UF MINI 31 gauge x 3/16\" ndle USE AS DIRECTED 100 Pen Needle 11  
 glucose blood VI test strips (FREESTYLE LITE STRIPS) strip Use 2 times a day 100 Strip 5  aspirin 81 mg tablet Take 81 mg by mouth daily. Patient Active Problem List  
Diagnosis Code  Hypertension I10  Morbid obesity ([address] E66.01  
 Diabetes ([facility] E11.9  Proteinuria R80.9  Other and unspecified disc disorder of lumbar region M51.9  Type II diabetes mellitus, uncontrolled ([address] E11.65  Dyslipidemia, goal LDL below 100 E78.5  Primary osteoarthritis of one knee M17.10 System Review: Cardiovascular ROS - taking medications as instructed, no medication side effects noted, no TIA's, no chest pain on exertion, has been nauseated frequently has lost 8 pounds and has found his glucose to be near normal 
 
Visit Vitals  /75  Pulse 70  Temp 96.1 °F (35.6 °C) (Oral)  Resp 18  Ht 5' 6\" (1.676 m)  Wt 280 lb (127 kg)  SpO2 96%  BMI 45.19 kg/m2 Appearance: alert, well appearing, and in no distress and overweight. General exam: CVS exam BP noted to be well controlled today in office, S1, S2 normal, no gallop, no murmur, chest clear, no JVD, no HSM, no edema. Lab review: labs reviewed, I note that glycosylated hemoglobin improved 
unchanged 
, orders written for new lab studies as appropriate; see orders. Lab Results Component Value Date/Time Hemoglobin A1c 9.6 (H) 05/03/2017 12:00 AM  
 Hemoglobin A1c (POC) 8.0 02/06/2018 04:24 PM  
 
 
ASSESSMENT: 
diabetes better controlled, hypertension well controlled. PLAN: 
lab results and schedule of future lab studies reviewed with patient 
reviewed medications and side effects in detail. Diagnoses and all orders for this visit: 
 
1. Uncontrolled type 2 diabetes mellitus with hyperglycemia ([facility] -     AMB POC HEMOGLOBIN A1C 2. Drug-induced nausea and vomiting Other orders 
-     insulin glargine-lixisenatide (SOLIQUA 100/33) 100 unit-33 mcg/mL inpn; 30 Units by SubCUTAneous route Daily (before breakfast). -     metFORMIN ER (GLUCOPHAGE XR) 500 mg tablet; Take 3 Tabs by mouth daily (with dinner). Indications: type 2 diabetes mellitus Will lower doses of soliqua  to 30 units and 1500 metformin 3 week follow

## 2018-10-03 NOTE — PROGRESS NOTES
1. Have you been to the ER, urgent care clinic since your last visit? Hospitalized since your last visit? No 
 
2. Have you seen or consulted any other health care providers outside of the [address] [place] [address] since your last visit? Include any pap smears or colon screening.  No

## 2018-10-03 NOTE — MR AVS SNAPSHOT
[address] [place] [address] [address] [facility] 
[phone] Patient: Patricia Maradiaga MRN:  [id number] Visit Information Date & Time Provider Department Dept. Phone Encounter #  
 10/3/2018  4:15 PM Chris Joseph, [address] [facility] [phone] [id number] Your Appointments 10/18/2018  8:15 AM  
ROUTINE CARE with Chris Joseph MD  
[facility]) Appt Note: R F/U  
 [place] [address] [facility] [address]  
[facility] [address] [facility] [address] Upcoming Health Maintenance Date Due Shingrix Vaccine Age 50> (1 of 2) 7/8/2010 FOOT EXAM Q1 6/21/2017 Influenza Age 5 to Adult 8/1/2018 EYE EXAM RETINAL OR DILATED Q1 9/13/2018 HEMOGLOBIN A1C Q6M 1/9/2019 MICROALBUMIN Q1 7/13/2019 LIPID PANEL Q1 7/13/2019 COLONOSCOPY 2/26/2028 DTaP/Tdap/Td series (2 - Td) 7/31/2028 Allergies as of 10/3/2018  Review Complete On: 10/3/2018 By: Gwendlyn Lundborg, LPN No Known Allergies Current Immunizations  Reviewed on 10/12/2015 Name Date  
 TB Skin Test (PPD) Intradermal 10/12/2015 Tdap 7/31/2018 ZZZ-RETIRED (DO NOT USE) Pneumococcal Vaccine (Unspecified Type) 8/2/2008 Not reviewed this visit You Were Diagnosed With   
  
 Codes Comments Uncontrolled type 2 diabetes mellitus with hyperglycemia (HCC)    -  Primary ICD-10-CM: E11.65 ICD-9-CM: 250.02 Vitals BP Pulse Temp Resp Height(growth percentile) Weight(growth percentile) 110/75 70 96.1 °F (35.6 °C) (Oral) 18 5' 6\" (1.676 m) 280 lb (127 kg) SpO2 BMI Smoking Status 96% 45.19 kg/m2 Never Smoker Vitals History BMI and BSA Data Body Mass Index Body Surface Area  
 45.19 kg/m 2 2.43 m 2 Preferred Pharmacy Pharmacy Name Phone [facility] [address] [facility] [address] AT [address] [place] [address] [phone] Your Updated Medication List  
  
   
This list is accurate as of 10/3/18  4:52 PM.  Always use your most recent med list.  
  
  
  
  
 aspirin 81 mg tablet Take 81 mg by mouth daily. BD ULTRA-FINE MINI PEN NEEDLE 31 gauge x 3/16\" Ndle Generic drug:  Insulin Needles (Disposable) USE AS DIRECTED  
  
 glucose blood VI test strips strip Commonly known as:  FREESTYLE LITE STRIPS Use 2 times a day  
  
 insulin glargine-lixisenatide 100 unit-33 mcg/mL Inpn Commonly known as:  SOLIQUA 100/33  
30 Units by SubCUTAneous route Daily (before breakfast). JARDIANCE 25 mg tablet Generic drug:  empagliflozin TAKE 1 TABLET BY MOUTH DAILY losartan-hydroCHLOROthiazide 100-25 mg per tablet Commonly known as:  HYZAAR  
TAKE 1 TABLET EVERY DAY  
  
 lovastatin 40 mg tablet Commonly known as:  MEVACOR  
TAKE 1 TABLET BY MOUTH EVERY EVENING  
  
 metFORMIN  mg tablet Commonly known as:  GLUCOPHAGE XR Take 3 Tabs by mouth daily (with dinner). Indications: type 2 diabetes mellitus Prescriptions Sent to Pharmacy Refills  
 insulin glargine-lixisenatide (SOLIQUA 100/33) 100 unit-33 mcg/mL inpn 5 Si Units by SubCUTAneous route Daily (before breakfast). Class: Normal  
 Pharmacy: [facility] [address] [facility] [address]  [facility] Ph #: [phone] Route: SubCUTAneous We Performed the Following AMB POC HEMOGLOBIN A1C [48308 CPT(R)] Introducing [facility] & HEALTH SERVICES! Dear Tori Carrel: Thank you for requesting a [employer] account. Our records indicate that you already have an active [employer] account. You can access your account anytime at [email or web address] Did you know that you can access your hospital and ER discharge instructions at any time in [employer]? You can also review all of your test results from your hospital stay or ER visit. Additional Information If you have questions, please visit the Frequently Asked Questions section of the [employer] website at [email or web address] Remember, [employer] is NOT to be used for urgent needs. For medical emergencies, dial 911. Now available from your iPhone and Android! Please provide this summary of care documentation to your next provider. Your primary care clinician is listed as Fahad Matos. If you have any questions after today's visit, please call [phone].

## 2018-10-18 ENCOUNTER — OFFICE VISIT (OUTPATIENT)
Dept: INTERNAL MEDICINE CLINIC | Age: 58
End: 2018-10-18

## 2018-10-18 VITALS
WEIGHT: 280.8 LBS | HEIGHT: 66 IN | SYSTOLIC BLOOD PRESSURE: 127 MMHG | TEMPERATURE: 96 F | DIASTOLIC BLOOD PRESSURE: 67 MMHG | BODY MASS INDEX: 45.13 KG/M2 | OXYGEN SATURATION: 98 % | HEART RATE: 63 BPM | RESPIRATION RATE: 18 BRPM

## 2018-10-18 DIAGNOSIS — E11.8 TYPE 2 DIABETES MELLITUS WITH COMPLICATION, UNSPECIFIED WHETHER LONG TERM INSULIN USE: Primary | ICD-10-CM
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
## 2018-10-18 RX ORDER — AMOXICILLIN AND CLAVULANATE POTASSIUM 875; 125 MG/1; MG/1
TABLET, FILM COATED ORAL EVERY 12 HOURS
COMMUNITY
End: 2019-01-17 | Stop reason: ALTCHOICE

## 2018-10-18 RX ORDER — MUPIROCIN 20 MG/G
OINTMENT TOPICAL 3 TIMES DAILY
COMMUNITY
End: 2019-01-17 | Stop reason: ALTCHOICE

## 2018-10-18 RX ORDER — TRIAMCINOLONE ACETONIDE 1 MG/G
OINTMENT TOPICAL 3 TIMES DAILY
COMMUNITY
End: 2019-01-17 | Stop reason: ALTCHOICE

## 2018-10-18 NOTE — PROGRESS NOTES
1. Have you been to the ER, urgent care clinic since your last visit? Hospitalized since your last visit?yes. Urgent care for bug bite    2. Have you seen or consulted any other health care providers outside of the [address] [place] [address] since your last visit? Include any pap smears or colon screening.  No

## 2018-10-18 NOTE — PROGRESS NOTES
Chief Complaint   Patient presents with    Diabetes    Hypertension    Cholesterol Problem     SUBJECTIVE: Jose Yanes is a 62 y.o. male seen for a follow up visit; he has diabetes, hypertension and hyperlipidemia. Current Outpatient Medications   Medication Sig Dispense Refill    mupirocin (BACTROBAN) 2 % ointment Apply  to affected area three (3) times daily.  triamcinolone acetonide (KENALOG) 0.1 % ointment Apply  to affected area three (3) times daily. use thin layer      amoxicillin-clavulanate (AUGMENTIN) 875-125 mg per tablet Take  by mouth every twelve (12) hours.  lovastatin (MEVACOR) 40 mg tablet TAKE 1 TABLET BY MOUTH EVERY EVENING 90 Tab 3    insulin glargine-lixisenatide (SOLIQUA 100/33) 100 unit-33 mcg/mL inpn 30 Units by SubCUTAneous route Daily (before breakfast). 5 Syringe 5    metFORMIN ER (GLUCOPHAGE XR) 500 mg tablet Take 3 Tabs by mouth daily (with dinner). Indications: type 2 diabetes mellitus 120 Tab 5    losartan-hydroCHLOROthiazide (HYZAAR) 100-25 mg per tablet TAKE 1 TABLET EVERY DAY 30 Tab 5    JARDIANCE 25 mg tablet TAKE 1 TABLET BY MOUTH DAILY 30 Tab 11    BD INSULIN PEN NEEDLE UF MINI 31 gauge x 3/16\" ndle USE AS DIRECTED 100 Pen Needle 11    glucose blood VI test strips (FREESTYLE LITE STRIPS) strip Use 2 times a day 100 Strip 5    aspirin 81 mg tablet Take 81 mg by mouth daily.        Patient Active Problem List   Diagnosis Code    Hypertension I10    Morbid obesity ([address] E66.01    Diabetes ([address] E11.9    Proteinuria R80.9    Other and unspecified disc disorder of lumbar region M51.9    Type II diabetes mellitus, uncontrolled ([address] E11.65    Dyslipidemia, goal LDL below 100 E78.5    Primary osteoarthritis of one knee M17.10     System Review: Cardiovascular ROS - taking medications as instructed, no medication side effects noted, no TIA's, no chest pain on exertion, has been nauseated frequently has lost 8 pounds and has found his glucose to be near normal    insurance will not authorize soliqua  At this time  He is feeling better    Seen better med for red bug bites given augmentim and better    Visit Vitals  /67 (BP 1 Location: Right arm, BP Patient Position: Sitting)   Pulse 63   Temp 96 °F (35.6 °C) (Oral)   Resp 18   Ht 5' 6\" (1.676 m)   Wt 280 lb 12.8 oz (127.4 kg)   SpO2 98%   BMI 45.32 kg/m²      Appearance: alert, well appearing, and in no distress and overweight. General exam: CVS exam BP noted to be well controlled today in office, S1, S2 normal, no gallop, no murmur, chest clear, no JVD, no HSM, no edema. Right arm  Clear no infection  Lab review: labs reviewed, I note that glycosylated hemoglobin improved  unchanged  , orders written for new lab studies as appropriate; see orders. Lab Results   Component Value Date/Time    Hemoglobin A1c 9.6 (H) 05/03/2017 12:00 AM    Hemoglobin A1c (POC) 7.1 10/03/2018 04:44 PM       ASSESSMENT:  diabetes better controlled, hypertension well controlled. PLAN:  lab results and schedule of future lab studies reviewed with patient  reviewed medications and side effects in detail.     soliqua  to 30 and no issues    \1. Type 2 diabetes mellitus with complication, unspecified whether long term insulin use ([address]  Try to continue soliqua as is   Will get   Pharm company involved    2. Bug bite, subsequent encounter  resolving    Diagnoses and all orders for this visit:    Type 2 diabetes mellitus with complication, unspecified whether long term insulin use ([address]    Bug bite, subsequent encounter    Other orders  -     insulin glargine U-300 conc (TOUJEO SOLOSTAR U-300 INSULIN) 300 unit/mL (1.5 mL) inpn; 30 Units by SubCUTAneous route Daily (before breakfast). -     Liraglutide (VICTOZA) 0.6 mg/0.1 mL (18 mg/3 mL) pnij; 0.6 mg by SubCUTAneous route daily.       Will give toujeo and victoza until soliqua can be figured out

## 2018-12-25 RX ORDER — PEN NEEDLE, DIABETIC 31 GX5/16"
NEEDLE, DISPOSABLE MISCELLANEOUS
Qty: 100 PEN NEEDLE | Refills: 11 | Status: SHIPPED | OUTPATIENT
Start: 2018-12-25 | End: 2020-01-06

## 2019-01-17 ENCOUNTER — OFFICE VISIT (OUTPATIENT)
Dept: INTERNAL MEDICINE CLINIC | Age: 59
End: 2019-01-17

## 2019-01-17 VITALS
TEMPERATURE: 96.7 F | SYSTOLIC BLOOD PRESSURE: 115 MMHG | RESPIRATION RATE: 16 BRPM | OXYGEN SATURATION: 98 % | HEART RATE: 60 BPM | DIASTOLIC BLOOD PRESSURE: 67 MMHG | WEIGHT: 275 LBS | HEIGHT: 66 IN | BODY MASS INDEX: 44.2 KG/M2

## 2019-01-17 DIAGNOSIS — E11.8 TYPE 2 DIABETES MELLITUS WITH COMPLICATION, UNSPECIFIED WHETHER LONG TERM INSULIN USE: Primary | ICD-10-CM

## 2019-01-17 NOTE — PROGRESS NOTES
1. Have you been to the ER, urgent care clinic since your last visit? Hospitalized since your last visit? No    2. Have you seen or consulted any other health care providers outside of the [address] [place] [address] since your last visit? Include any pap smears or colon screening.  Podiatry-Dr Vijay Chawla

## 2019-01-17 NOTE — PROGRESS NOTES
SUBJECTIVE: Viridiana Hylton is a 62 y.o. male seen for a follow up visit; he has diabetes, hypertension and hyperlipidemia. Current Outpatient Medications   Medication Sig Dispense Refill    BD ULTRA-FINE MINI PEN NEEDLE 31 gauge x 3/16\" ndle USE AS DIRECTED 100 Pen Needle 11    metFORMIN ER (GLUCOPHAGE XR) 500 mg tablet TAKE 4 TABLETS BY MOUTH EVERY DAY AS DIRECTED 120 Tab 11    insulin glargine U-300 conc (TOUJEO SOLOSTAR U-300 INSULIN) 300 unit/mL (1.5 mL) inpn 30 Units by SubCUTAneous route Daily (before breakfast). 3 Pen 5    Liraglutide (VICTOZA) 0.6 mg/0.1 mL (18 mg/3 mL) pnij 0.6 mg by SubCUTAneous route daily. 5 Pen 5    lovastatin (MEVACOR) 40 mg tablet TAKE 1 TABLET BY MOUTH EVERY EVENING 90 Tab 3    metFORMIN ER (GLUCOPHAGE XR) 500 mg tablet Take 3 Tabs by mouth daily (with dinner). Indications: type 2 diabetes mellitus 120 Tab 5    losartan-hydroCHLOROthiazide (HYZAAR) 100-25 mg per tablet TAKE 1 TABLET EVERY DAY 30 Tab 5    JARDIANCE 25 mg tablet TAKE 1 TABLET BY MOUTH DAILY 30 Tab 11    glucose blood VI test strips (FREESTYLE LITE STRIPS) strip Use 2 times a day 100 Strip 5    aspirin 81 mg tablet Take 81 mg by mouth daily.        Patient Active Problem List   Diagnosis Code    Hypertension I10    Morbid obesity ([facility] E66.01    Diabetes ([facility] E11.9    Proteinuria R80.9    Other and unspecified disc disorder of lumbar region M51.9    Type II diabetes mellitus, uncontrolled ([address] E11.65    Dyslipidemia, goal LDL below 100 E78.5    Primary osteoarthritis of one knee M17.10     System Review: Cardiovascular ROS - taking medications as instructed, no medication side effects noted, no TIA's, no chest pain on exertion, has been nauseated frequently has lost 8 pounds and has found his glucose to be near normal  His insurance would not  let him use combo insulin/victoza  So on low dose victoza and  Insulin   Readings mostly good    On 1500 metformin  Visit Vitals  /67 (BP 1 Location: Right arm, BP Patient Position: Sitting)   Pulse 60   Temp 96.7 °F (35.9 °C) (Oral)   Resp 16   Ht 5' 6\" (1.676 m)   Wt 275 lb (124.7 kg)   SpO2 98%   BMI 44.39 kg/m²      Wt Readings from Last 3 Encounters:   01/17/19 275 lb (124.7 kg)   10/18/18 280 lb 12.8 oz (127.4 kg)   10/03/18 280 lb (127 kg)       Appearance: alert, well appearing, and in no distress and overweight. General exam: CVS exam BP noted to be well controlled today in office, S1, S2 normal, no gallop, no murmur, chest clear, no JVD, no HSM, no edema. Lab review: labs reviewed, I note that glycosylated hemoglobin  pending  Home glucose readings reviewed   Gym 3 to 4 times per week  , orders written for new lab studies as appropriate; see orders. Lab Results   Component Value Date/Time    Hemoglobin A1c 9.6 (H) 05/03/2017 12:00 AM    Hemoglobin A1c (POC) 7.1 10/03/2018 04:44 PM       ASSESSMENT:  diabetes better controlled, hypertension well controlled. PLAN:  lab results and schedule of future lab studies reviewed with patient  reviewed medications and side effects in detail. Diagnoses and all orders for this visit:    1.  Type 2 diabetes mellitus with complication, unspecified whether long term insulin use (HCC)  -     LIPID PANEL  -     METABOLIC PANEL, COMPREHENSIVE        Continue Insulin plus victoza and jardiance

## 2019-01-18 LAB
ALBUMIN SERPL-MCNC: 4.1 G/DL (ref 3.5–5.5)
ALBUMIN/GLOB SERPL: 1.6 {RATIO} (ref 1.2–2.2)
ALP SERPL-CCNC: 74 IU/L (ref 39–117)
ALT SERPL-CCNC: 19 IU/L (ref 0–44)
AST SERPL-CCNC: 20 IU/L (ref 0–40)
BILIRUB SERPL-MCNC: 0.3 MG/DL (ref 0–1.2)
BUN SERPL-MCNC: 15 MG/DL (ref 6–24)
BUN/CREAT SERPL: 15 (ref 9–20)
CALCIUM SERPL-MCNC: 9.3 MG/DL (ref 8.7–10.2)
CHLORIDE SERPL-SCNC: 101 MMOL/L (ref 96–106)
CHOLEST SERPL-MCNC: 148 MG/DL (ref 100–199)
CO2 SERPL-SCNC: 22 MMOL/L (ref 20–29)
CREAT SERPL-MCNC: 0.97 MG/DL (ref 0.76–1.27)
GLOBULIN SER CALC-MCNC: 2.5 G/DL (ref 1.5–4.5)
GLUCOSE SERPL-MCNC: 94 MG/DL (ref 65–99)
HBA1C MFR BLD: 7 % (ref 4.8–5.6)
HDLC SERPL-MCNC: 57 MG/DL
INTERPRETATION, 910389: NORMAL
LDLC SERPL CALC-MCNC: 81 MG/DL (ref 0–99)
Lab: NORMAL
POTASSIUM SERPL-SCNC: 4 MMOL/L (ref 3.5–5.2)
PROT SERPL-MCNC: 6.6 G/DL (ref 6–8.5)
SODIUM SERPL-SCNC: 137 MMOL/L (ref 134–144)
TRIGL SERPL-MCNC: 51 MG/DL (ref 0–149)
VLDLC SERPL CALC-MCNC: 10 MG/DL (ref 5–40)

## 2019-03-18 RX ORDER — LOSARTAN POTASSIUM AND HYDROCHLOROTHIAZIDE 25; 100 MG/1; MG/1
TABLET ORAL
Qty: 30 TAB | Refills: 0 | Status: SHIPPED | OUTPATIENT
Start: 2019-03-18 | End: 2019-05-04 | Stop reason: SDUPTHER

## 2019-05-21 ENCOUNTER — OFFICE VISIT (OUTPATIENT)
Dept: INTERNAL MEDICINE CLINIC | Age: 59
End: 2019-05-21

## 2019-05-21 ENCOUNTER — PATIENT OUTREACH (OUTPATIENT)
Dept: INTERNAL MEDICINE CLINIC | Age: 59
End: 2019-05-21

## 2019-05-21 VITALS
RESPIRATION RATE: 16 BRPM | BODY MASS INDEX: 44.84 KG/M2 | OXYGEN SATURATION: 98 % | TEMPERATURE: 97.6 F | HEIGHT: 66 IN | HEART RATE: 62 BPM | WEIGHT: 279 LBS | SYSTOLIC BLOOD PRESSURE: 113 MMHG | DIASTOLIC BLOOD PRESSURE: 76 MMHG

## 2019-05-21 DIAGNOSIS — E66.01 MORBID OBESITY (HCC): ICD-10-CM

## 2019-05-21 DIAGNOSIS — E11.8 TYPE 2 DIABETES MELLITUS WITH COMPLICATION, UNSPECIFIED WHETHER LONG TERM INSULIN USE: Primary | ICD-10-CM

## 2019-05-21 LAB — HBA1C MFR BLD HPLC: 7.3 %

## 2019-05-21 RX ORDER — METFORMIN HYDROCHLORIDE 500 MG/1
1500 TABLET, EXTENDED RELEASE ORAL
Qty: 120 TAB | Refills: 11
Start: 2019-05-21 | End: 2019-08-20 | Stop reason: SDUPTHER

## 2019-05-21 NOTE — PROGRESS NOTES
Patient requested to meet with this CDE for nutrition education r/t DM.  Contacted patient and scheduled appointment on 6/3 @ 8 AM.

## 2019-05-21 NOTE — PROGRESS NOTES
1. Have you been to the ER, urgent care clinic since your last visit? Hospitalized since your last visit? No    2. Have you seen or consulted any other health care providers outside of the [address] [place] [address] since your last visit? Include any pap smears or colon screening.  No

## 2019-05-22 NOTE — PROGRESS NOTES
Chief Complaint   Patient presents with    Hypertension    Diabetes    Arthritis     SUBJECTIVE: Martin Pinzon is a 62 y.o. male seen for a follow up visit; he has diabetes, hypertension and hyperlipidemia. Current Outpatient Medications   Medication Sig Dispense Refill    metFORMIN ER (GLUCOPHAGE XR) 500 mg tablet Take 3 Tabs by mouth daily (with dinner). 120 Tab 11    insulin glargine U-300 conc (TOUJEO SOLOSTAR U-300 INSULIN) 300 unit/mL (1.5 mL) inpn 30 Units by SubCUTAneous route Daily (before breakfast). 5 Pen 5    losartan-hydroCHLOROthiazide (HYZAAR) 100-25 mg per tablet TAKE 1 TABLET EVERY DAY 30 Tab 5    JARDIANCE 25 mg tablet TAKE 1 TABLET BY MOUTH DAILY 30 Tab 5    BD ULTRA-FINE MINI PEN NEEDLE 31 gauge x 3/16\" ndle USE AS DIRECTED 100 Pen Needle 11    Liraglutide (VICTOZA) 0.6 mg/0.1 mL (18 mg/3 mL) pnij 0.6 mg by SubCUTAneous route daily. 5 Pen 5    lovastatin (MEVACOR) 40 mg tablet TAKE 1 TABLET BY MOUTH EVERY EVENING 90 Tab 3    metFORMIN ER (GLUCOPHAGE XR) 500 mg tablet Take 3 Tabs by mouth daily (with dinner). Indications: type 2 diabetes mellitus 120 Tab 5    glucose blood VI test strips (FREESTYLE LITE STRIPS) strip Use 2 times a day 100 Strip 5    aspirin 81 mg tablet Take 81 mg by mouth daily.        Patient Active Problem List   Diagnosis Code    Hypertension I10    Morbid obesity ([address] E66.01    Diabetes ([facility] E11.9    Proteinuria R80.9    Other and unspecified disc disorder of lumbar region M51.9    Type II diabetes mellitus, uncontrolled ([address] E11.65    Dyslipidemia, goal LDL below 100 E78.5    Primary osteoarthritis of one knee M17.10     System Review: Cardiovascular ROS - taking medications as instructed, no medication side effects noted, no TIA's, no chest pain on exertion, has been nauseated frequently has lost 8 pounds and has found his glucose to be near normal  His insurance would not  let him use combo insulin/victoza unfortunately  Diet fair  So on low dose victoza and  Insulin   Readings mostly good    On 1500 metformin  Visit Vitals  /76 (BP 1 Location: Left arm, BP Patient Position: Sitting)   Pulse 62   Temp 97.6 °F (36.4 °C) (Oral)   Resp 16   Ht 5' 6\" (1.676 m)   Wt 279 lb (126.6 kg)   SpO2 98%   BMI 45.03 kg/m²      Wt Readings from Last 3 Encounters:   05/21/19 279 lb (126.6 kg)   01/17/19 275 lb (124.7 kg)   10/18/18 280 lb 12.8 oz (127.4 kg)       Appearance: alert, well appearing, and in no distress and overweight. General exam: CVS exam BP noted to be well controlled today in office, S1, S2 normal, no gallop, no murmur, chest clear, no JVD, no HSM, no edema. Lab review: labs reviewed, I note that glycosylated hemoglobin  pending  Home glucose readings reviewed   Gym 3 to 4 times per week  , orders written for new lab studies as appropriate; see orders. Lab Results   Component Value Date/Time    Hemoglobin A1c 7.0 (H) 01/17/2019 09:01 AM    Hemoglobin A1c (POC) 7.3 05/21/2019 09:32 AM     Lab Results   Component Value Date/Time    Cholesterol, total 148 01/17/2019 09:01 AM    HDL Cholesterol 57 01/17/2019 09:01 AM    LDL, calculated 81 01/17/2019 09:01 AM    VLDL, calculated 10 01/17/2019 09:01 AM    Triglyceride 51 01/17/2019 09:01 AM    CHOL/HDL Ratio 2.9 08/18/2009 09:38 AM       ASSESSMENT:  diabetes good controlled, hypertension well controlled. PLAN:  lab results and schedule of future lab studies reviewed with patient  reviewed medications and side effects in detail. Diagnoses and all orders for this visit:    1. Type 2 diabetes mellitus with complication, unspecified whether long term insulin use (HCC)  -     AMB POC HEMOGLOBIN A1C    2. Morbid obesity ([address]    Other orders  -     metFORMIN ER (GLUCOPHAGE XR) 500 mg tablet; Take 3 Tabs by mouth daily (with dinner).   -     insulin glargine U-300 conc (TOUJEO SOLOSTAR U-300 INSULIN) 300 unit/mL (1.5 mL) inpn; 30 Units by SubCUTAneous route Daily (before breakfast).         Continue Insulin plus victoza and jardiance

## 2019-06-03 ENCOUNTER — PATIENT OUTREACH (OUTPATIENT)
Dept: INTERNAL MEDICINE CLINIC | Age: 59
End: 2019-06-03

## 2019-06-03 NOTE — PROGRESS NOTES
Patient missed appointment scheduled today at 8 AM with this CDE. LVMMs on home and mobile numbers requesting a return call to reschedule. Sent patient a [employer] message well.

## 2019-07-28 RX ORDER — LIRAGLUTIDE 6 MG/ML
INJECTION SUBCUTANEOUS
Qty: 6 ML | Refills: 0 | Status: SHIPPED | OUTPATIENT
Start: 2019-07-28 | End: 2019-08-22 | Stop reason: SDUPTHER

## 2019-07-30 ENCOUNTER — OFFICE VISIT (OUTPATIENT)
Dept: INTERNAL MEDICINE CLINIC | Age: 59
End: 2019-07-30

## 2019-07-30 VITALS
RESPIRATION RATE: 18 BRPM | DIASTOLIC BLOOD PRESSURE: 65 MMHG | HEART RATE: 70 BPM | TEMPERATURE: 97.5 F | HEIGHT: 66 IN | WEIGHT: 284 LBS | OXYGEN SATURATION: 94 % | SYSTOLIC BLOOD PRESSURE: 119 MMHG | BODY MASS INDEX: 45.64 KG/M2

## 2019-07-30 DIAGNOSIS — K58.1 IRRITABLE BOWEL SYNDROME WITH CONSTIPATION: Primary | ICD-10-CM

## 2019-07-30 NOTE — PROGRESS NOTES
1. Have you been to the ER, urgent care clinic since your last visit? Hospitalized since your last visit? No    2. Have you seen or consulted any other health care providers outside of the [address] [place] [address] since your last visit? Include any pap smears or colon screening.  No

## 2019-07-31 NOTE — PROGRESS NOTES
Chief Complaint   Patient presents with    Abdominal Pain     having loose stools     Subjective: This is a 61year old black male with diabetes. Chief complaint is a little bit of change in bowel habits. He is eating differently over the summer, eating more BBQ and more vegetables. He has had some intermittent constipation, no diarrhea, no nausea or vomiting. He takes Metformin and Victoza, but they do not seem to be bothering him. Physical Examination:  His abdomen is soft, bowel sounds active. No hernia. Plan:  1. I think he needs to add more fiber. I recommended picking up some Benefiber and put one package or one scoop in coffee, water or juice daily and see if he improves.     Patient Active Problem List    Diagnosis    Primary osteoarthritis of one knee    Dyslipidemia, goal LDL below 100    Type II diabetes mellitus, uncontrolled ([address]    Hypertension    Morbid obesity ([address]    Diabetes ([address]    Proteinuria    Other and unspecified disc disorder of lumbar region

## 2019-08-20 ENCOUNTER — OFFICE VISIT (OUTPATIENT)
Dept: INTERNAL MEDICINE CLINIC | Age: 59
End: 2019-08-20

## 2019-08-20 VITALS
SYSTOLIC BLOOD PRESSURE: 142 MMHG | BODY MASS INDEX: 46.28 KG/M2 | HEART RATE: 69 BPM | WEIGHT: 288 LBS | OXYGEN SATURATION: 96 % | HEIGHT: 66 IN | TEMPERATURE: 98 F | DIASTOLIC BLOOD PRESSURE: 65 MMHG | RESPIRATION RATE: 16 BRPM

## 2019-08-20 DIAGNOSIS — Z79.899 HIGH RISK MEDICATION USE: Primary | ICD-10-CM

## 2019-08-20 DIAGNOSIS — E11.8 TYPE 2 DIABETES MELLITUS WITH COMPLICATION, UNSPECIFIED WHETHER LONG TERM INSULIN USE: ICD-10-CM

## 2019-08-20 RX ORDER — TERBINAFINE HYDROCHLORIDE 250 MG/1
250 TABLET ORAL DAILY
Qty: 45 TAB | Refills: 0 | Status: SHIPPED | OUTPATIENT
Start: 2019-08-20 | End: 2019-11-26 | Stop reason: ALTCHOICE

## 2019-08-20 NOTE — PROGRESS NOTES
Chief Complaint   Patient presents with    Diabetes    Hypertension    Cholesterol Problem     Podiatry gave lamisil for 45 days for fungal toenails they sent back to me to do labs  He notes no side effects and clearing left 1st toenail some    Vitals:    08/20/19 1001   BP: 142/65   Pulse: 69   Resp: 16   Temp: 98 °F (36.7 °C)   TempSrc: Oral   SpO2: 96%   Weight: 288 lb (130.6 kg)   Height: 5' 6\" (1.676 m)     Nail exam reveals onychomycosis of the toenails, onycholysis and dystrophic nails. Diagnoses and all orders for this visit:    1. High risk medication use  -     METABOLIC PANEL, COMPREHENSIVE    2. Type 2 diabetes mellitus with complication, unspecified whether long term insulin use (HCC)  -     HEMOGLOBIN A1C W/O EAG    Other orders  -     terbinafine HCl (LAMISIL) 250 mg tablet; Take 1 Tab by mouth daily.  Indications: fungal disease of the nails      If lfts normal will finish 6 more weeks

## 2019-08-20 NOTE — PROGRESS NOTES
1. Have you been to the ER, urgent care clinic since your last visit? Hospitalized since your last visit? No    2. Have you seen or consulted any other health care providers outside of the [address] [place] [address] since your last visit? Include any pap smears or colon screening.  No

## 2019-08-21 LAB
ALBUMIN SERPL-MCNC: 4.1 G/DL (ref 3.5–5.5)
ALBUMIN/GLOB SERPL: 1.5 {RATIO} (ref 1.2–2.2)
ALP SERPL-CCNC: 98 IU/L (ref 39–117)
ALT SERPL-CCNC: 22 IU/L (ref 0–44)
AST SERPL-CCNC: 22 IU/L (ref 0–40)
BILIRUB SERPL-MCNC: 0.2 MG/DL (ref 0–1.2)
BUN SERPL-MCNC: 18 MG/DL (ref 6–24)
BUN/CREAT SERPL: 19 (ref 9–20)
CALCIUM SERPL-MCNC: 9.7 MG/DL (ref 8.7–10.2)
CHLORIDE SERPL-SCNC: 102 MMOL/L (ref 96–106)
CO2 SERPL-SCNC: 23 MMOL/L (ref 20–29)
CREAT SERPL-MCNC: 0.93 MG/DL (ref 0.76–1.27)
GLOBULIN SER CALC-MCNC: 2.8 G/DL (ref 1.5–4.5)
GLUCOSE SERPL-MCNC: 154 MG/DL (ref 65–99)
HBA1C MFR BLD: 8 % (ref 4.8–5.6)
POTASSIUM SERPL-SCNC: 4.1 MMOL/L (ref 3.5–5.2)
PROT SERPL-MCNC: 6.9 G/DL (ref 6–8.5)
SODIUM SERPL-SCNC: 142 MMOL/L (ref 134–144)

## 2019-10-15 ENCOUNTER — OFFICE VISIT (OUTPATIENT)
Dept: INTERNAL MEDICINE CLINIC | Age: 59
End: 2019-10-15

## 2019-10-15 VITALS
BODY MASS INDEX: 46.41 KG/M2 | OXYGEN SATURATION: 95 % | DIASTOLIC BLOOD PRESSURE: 70 MMHG | SYSTOLIC BLOOD PRESSURE: 130 MMHG | HEART RATE: 64 BPM | TEMPERATURE: 98.6 F | HEIGHT: 66 IN | RESPIRATION RATE: 18 BRPM | WEIGHT: 288.8 LBS

## 2019-10-15 DIAGNOSIS — S39.012A STRAIN OF LUMBAR REGION, INITIAL ENCOUNTER: Primary | ICD-10-CM

## 2019-10-15 RX ORDER — CYCLOBENZAPRINE HCL 10 MG
10 TABLET ORAL
Qty: 21 TAB | Refills: 0 | Status: SHIPPED | OUTPATIENT
Start: 2019-10-15 | End: 2019-11-26 | Stop reason: SDUPTHER

## 2019-10-15 RX ORDER — NAPROXEN 500 MG/1
500 TABLET ORAL 2 TIMES DAILY WITH MEALS
Qty: 14 TAB | Refills: 0 | Status: SHIPPED | OUTPATIENT
Start: 2019-10-15 | End: 2020-04-23 | Stop reason: ALTCHOICE

## 2019-10-15 NOTE — PROGRESS NOTES
Chief Complaint   Patient presents with   [address]     started 10/14/19 happen while moving pain is 6 out 10     he is a 61y.o. year old male who presents for evalution. He has been having some back pain. He was moving some boxes. Pain primarily on the right side. He took Advil around 7:30 this morning with some relief  He also has tried some warm heat and a topical.      Reviewed PmHx, RxHx, FmHx, SocHx, AllgHx and updated and dated in the chart. Review of Systems - negative except as listed above    Objective:     Vitals:    10/15/19 1530   BP: 130/70   Pulse: 64   Resp: 18   Temp: 98.6 °F (37 °C)   SpO2: 95%   Weight: 288 lb 12.8 oz (131 kg)   Height: 5' 6\" (1.676 m)     Physical Examination: General appearance - alert, well appearing, and in no distress and morbidly obese  Musculoskeletal - lumbar- mild tenderness right lumbar area. Increase pain with flexion. No increase pain with twisting or extension. Negative SLR bilaterally    Assessment/ Plan:   Diagnoses and all orders for this visit:    1. Strain of lumbar region, initial encounter  -     naproxen (NAPROSYN) 500 mg tablet; Take 1 Tab by mouth two (2) times daily (with meals). -     cyclobenzaprine (FLEXERIL) 10 mg tablet; Take 1 Tab by mouth three (3) times daily as needed for Muscle Spasm(s). advised the patient to take medication as prescribed. Suggested he also do moist heat alternating with cold therapy. He should follow up if not better. Reminded the patient to use good body mechanics when lifting. I have discussed the diagnosis with the patient and the intended plan as seen in the above orders. The patient has received an after-visit summary and questions were answered concerning future plans.      Medication Side Effects and Warnings were discussed with patient: yes  Patient Labs were reviewed and or requested: n/a  Patient Past Records were reviewed and or requested  yes    Gregoria Solis PA-C

## 2019-10-15 NOTE — PROGRESS NOTES
1. Have you been to the ER, urgent care clinic since your last visit? Hospitalized since your last visit? No    2. Have you seen or consulted any other health care providers outside of the [address] [place] [address] since your last visit? Include any pap smears or colon screening.  No   Chief Complaint   Patient presents with   [address]     started 10/14/19 happen while moving pain is 6 out 10     Not  Fasting

## 2019-11-26 ENCOUNTER — OFFICE VISIT (OUTPATIENT)
Dept: INTERNAL MEDICINE CLINIC | Age: 59
End: 2019-11-26

## 2019-11-26 VITALS
WEIGHT: 288 LBS | BODY MASS INDEX: 46.28 KG/M2 | HEIGHT: 66 IN | HEART RATE: 74 BPM | DIASTOLIC BLOOD PRESSURE: 71 MMHG | TEMPERATURE: 98.3 F | OXYGEN SATURATION: 97 % | SYSTOLIC BLOOD PRESSURE: 140 MMHG | RESPIRATION RATE: 18 BRPM

## 2019-11-26 DIAGNOSIS — M54.2 CERVICALGIA: Primary | ICD-10-CM

## 2019-11-26 RX ORDER — CYCLOBENZAPRINE HCL 10 MG
10 TABLET ORAL
Qty: 30 TAB | Refills: 0 | Status: SHIPPED | OUTPATIENT
Start: 2019-11-26 | End: 2020-04-23 | Stop reason: ALTCHOICE

## 2019-11-26 NOTE — PROGRESS NOTES
1. Have you been to the ER, urgent care clinic since your last visit? Hospitalized since your last visit? No    2. Have you seen or consulted any other health care providers outside of the [address] [place] [address] since your last visit? Include any pap smears or colon screening.  No

## 2019-11-26 NOTE — PROGRESS NOTES
Chief Complaint   Patient presents with    Neck Pain     neck stiffness     Left neck pain  For 3 weeks non radicular  On and off   No stress now   No injury  Sleeps flat in bed   No MVA    Vitals:    11/26/19 1123   BP: 140/71   Pulse: 74   Resp: 18   Temp: 98.3 °F (36.8 °C)   TempSrc: Oral   SpO2: 97%   Weight: 288 lb (130.6 kg)   Height: 5' 6\" (1.676 m)     no apparent distress  Back exam: full range of motion, no tenderness, palpable spasm or pain on motion. Mild tenderness left c spine  Upper arms normal  Normal  Gait      Diagnoses and all orders for this visit:    1. Cervicalgia  -     cyclobenzaprine (FLEXERIL) 10 mg tablet; Take 1 Tab by mouth three (3) times daily as needed for Muscle Spasm(s).       Try asper cream too      High risk medications reviewed  Seems to be non serious  Consider PT

## 2019-12-21 RX ORDER — LOVASTATIN 40 MG/1
TABLET ORAL
Qty: 90 TAB | Refills: 3 | Status: SHIPPED | OUTPATIENT
Start: 2019-12-21 | End: 2021-06-13

## 2019-12-29 RX ORDER — LOSARTAN POTASSIUM AND HYDROCHLOROTHIAZIDE 25; 100 MG/1; MG/1
TABLET ORAL
Qty: 30 TAB | Refills: 5 | Status: SHIPPED | OUTPATIENT
Start: 2019-12-29 | End: 2020-06-15

## 2020-01-02 RX ORDER — METFORMIN HYDROCHLORIDE 500 MG/1
TABLET, EXTENDED RELEASE ORAL
Qty: 120 TAB | Refills: 5 | Status: SHIPPED | OUTPATIENT
Start: 2020-01-02 | End: 2020-06-28

## 2020-01-02 RX ORDER — LIRAGLUTIDE 6 MG/ML
INJECTION SUBCUTANEOUS
Qty: 6 ML | Refills: 0 | Status: SHIPPED | OUTPATIENT
Start: 2020-01-02 | End: 2020-02-06

## 2020-01-06 RX ORDER — PEN NEEDLE, DIABETIC 31 GX5/16"
NEEDLE, DISPOSABLE MISCELLANEOUS
Qty: 100 PEN NEEDLE | Refills: 11 | Status: SHIPPED | OUTPATIENT
Start: 2020-01-06 | End: 2021-03-28

## 2020-04-23 ENCOUNTER — VIRTUAL VISIT (OUTPATIENT)
Dept: INTERNAL MEDICINE CLINIC | Age: 60
End: 2020-04-23

## 2020-04-23 VITALS — WEIGHT: 288 LBS | BODY MASS INDEX: 46.28 KG/M2 | TEMPERATURE: 97.6 F | HEIGHT: 66 IN

## 2020-04-23 DIAGNOSIS — R20.2 ARM PARESTHESIA, LEFT: Primary | ICD-10-CM

## 2020-04-23 RX ORDER — PEN NEEDLE, DIABETIC 31 GX3/16"
NEEDLE, DISPOSABLE MISCELLANEOUS
COMMUNITY
End: 2020-04-23 | Stop reason: SDUPTHER

## 2020-04-23 RX ORDER — ASPIRIN 81 MG/1
81 TABLET ORAL DAILY
COMMUNITY

## 2020-04-23 NOTE — PROGRESS NOTES
A1C this AM before breakfast 154. Patient c/o left arm discomfort that comes and goes for 4 days. No injury noted. Using the phone in the left hand, patient c/o of tingling, sometimes lying on chest he feels the tingling, but when he stretches out it goes away.

## 2020-04-23 NOTE — PROGRESS NOTES
Jacinto Madsen is a 61 y.o. male evaluated via telephone on 4/23/2020. Consent:  He and/or health care decision maker is aware that he may receive a bill for this telephone service, depending on his insurance coverage, and has provided verbal consent to proceed: Yes      Documentation:  I communicated with the patient and/or health care decision maker about his left arm pain. Details of this discussion including any medical advice provided: The patient reports he has been having some left arm pain. He states he has been on the phone a lot and the tingling seems to come when he has his arm in a certain position. He also noticed when he was in bed with his arm on his chest he could feel the tingling but when he straight his arm it will go away. He denies pain of the arm. No weakness. No chest pain. He has not taken anything for it. He reports if he touch his arm it does not hurt but when asked to put his arm up like holding the phone the tingling comes back. Diagnoses and all orders for this visit:    1. Arm paresthesia, left    I explained to the patient that I suspect he has irritated nerve with the repetitive motion throughout the day of holding his phone to his ear. I have asked the patient to stop doing this and to take an over-the-counter anti-inflammatory for the next couple of days. Also advised the patient that if he noticed he has some tenderness around the lateral or medial epicondyles then he is to ice this area. Unfortunately today we try to do a visual appointment but the patient's phone would not work correctly. I explained to him that if with eliminating the offending process and taking anti-inflammatories he continues to have tingling then he will need to be seen for virtual visit or a face-to-face visit and agree in clinic. I have asked the patient to please let me know how his symptoms are doing in a couple of days.     I affirm this is a Patient Initiated Episode with an Established Patient who has not had a related appointment within my department in the past 7 days or scheduled within the next 24 hours.     Total Time: minutes: 21-30 minutes    Note: not billable if this call serves to triage the patient into an appointment for the relevant concern      Gregoria Solis PA-C

## 2020-05-06 ENCOUNTER — VIRTUAL VISIT (OUTPATIENT)
Dept: INTERNAL MEDICINE CLINIC | Age: 60
End: 2020-05-06

## 2020-05-06 DIAGNOSIS — M47.812 CERVICAL SPONDYLOSIS: Primary | ICD-10-CM

## 2020-05-06 DIAGNOSIS — E11.9 TYPE 2 DIABETES MELLITUS WITHOUT COMPLICATION, WITH LONG-TERM CURRENT USE OF INSULIN (HCC): ICD-10-CM

## 2020-05-06 DIAGNOSIS — G56.02 CARPAL TUNNEL SYNDROME OF LEFT WRIST: ICD-10-CM

## 2020-05-06 DIAGNOSIS — Z79.4 TYPE 2 DIABETES MELLITUS WITHOUT COMPLICATION, WITH LONG-TERM CURRENT USE OF INSULIN (HCC): ICD-10-CM

## 2020-05-06 NOTE — PROGRESS NOTES
1. Have you been to the ER, urgent care clinic since your last visit? Hospitalized since your last visit? No    2. Have you seen or consulted any other health care providers outside of the [address] [place] [address] since your last visit? Include any pap smears or colon screening.  No   Chief Complaint   Patient presents with    Back Pain     back pain when in bed

## 2020-05-06 NOTE — PROGRESS NOTES
Chief Complaint   Patient presents with    Back Pain     back pain when in bed   THIS IS A VIRTUAL VISIT USING [employer] SOFTWARE    He had a visit with the PA about 10 or 12 days ago related to tingling in his hand. He now has pain in his upper back and into his neck. The tingling associated with that but the pain has been bothering when he lays in bed at night. He has not tried Advil or Tylenol. Diabetes control has not been great. He has upped his metformin to 2000 mg a day and his sugars have improved a little. He has no recent blood pressure readings, just found his blood pressure cuff and will get some readings for us. He has been moderately active. He has still been working from home some and going into work some, works for the [employer]. Denies fever or chills, cough, shortness breath, chest pain, change in vision, or change in neurological status. On exam, he could shrug his shoulders. He seemed to have a little range of motion reduction in his neck. He had pretty good  in his hands. His symptoms in his hands are tingling on the left that radiates up to his neck and sometimes down to the fingers, worse when he sits at a computer. Impression is carpal tunnel syndrome on the left; neck pain, could be cervical spondylosis. I am going to order an x-ray. He is going to have his labs done and then I am going to see if he can work on range of motion of the neck. Consideration of physical therapy; I suggested it. Functional wrist splint on the left. He will try those things. Patient Active Problem List    Diagnosis    Primary osteoarthritis of one knee    Dyslipidemia, goal LDL below 100    Type II diabetes mellitus, uncontrolled ([address]    Hypertension    Morbid obesity ([address]    Diabetes ([address]    Proteinuria    Other and unspecified disc disorder of lumbar region     1. Cervical spondylosis  Try tylrnol  - XR SPINE CERV 4 OR 5 V; Future    2.  Type 2 diabetes mellitus without complication, with long-term current use of insulin (HCC)  Poor mult mrds poor diet  - CBC WITH AUTOMATED DIFF; Future  - LIPID PANEL; Future  - METABOLIC PANEL, COMPREHENSIVE; Future  - MICROALBUMIN, UR, RAND W/ MICROALB/CREAT RATIO; Future  - HEMOGLOBIN A1C WITH EAG; Future    3.  Carpal tunnel syndrome of left wrist  Try splint

## 2020-05-08 ENCOUNTER — HOSPITAL ENCOUNTER (OUTPATIENT)
Dept: GENERAL RADIOLOGY | Age: 60
Discharge: HOME | End: 2020-05-08
Attending: INTERNAL MEDICINE
Payer: COMMERCIAL

## 2020-05-08 ENCOUNTER — HOSPITAL ENCOUNTER (OUTPATIENT)
Dept: LAB | Age: 60
Discharge: HOME | End: 2020-05-08
Payer: COMMERCIAL

## 2020-05-08 DIAGNOSIS — M47.812 CERVICAL SPONDYLOSIS: ICD-10-CM

## 2020-05-08 DIAGNOSIS — E11.9 TYPE 2 DIABETES MELLITUS WITHOUT COMPLICATION, WITH LONG-TERM CURRENT USE OF INSULIN (HCC): ICD-10-CM

## 2020-05-08 DIAGNOSIS — Z79.4 TYPE 2 DIABETES MELLITUS WITHOUT COMPLICATION, WITH LONG-TERM CURRENT USE OF INSULIN (HCC): ICD-10-CM

## 2020-05-08 LAB
ALBUMIN SERPL-MCNC: 3.6 G/DL (ref 3.5–5)
ALBUMIN/GLOB SERPL: 1.1 {RATIO} (ref 1.1–2.2)
ALP SERPL-CCNC: 98 U/L (ref 45–117)
ALT SERPL-CCNC: 30 U/L (ref 12–78)
ANION GAP SERPL CALC-SCNC: 4 MMOL/L (ref 5–15)
AST SERPL-CCNC: 18 U/L (ref 15–37)
BASOPHILS # BLD: 0 K/UL (ref 0–0.1)
BASOPHILS NFR BLD: 1 % (ref 0–1)
BILIRUB SERPL-MCNC: 0.4 MG/DL (ref 0.2–1)
BUN SERPL-MCNC: 16 MG/DL (ref 6–20)
BUN/CREAT SERPL: 18 (ref 12–20)
CALCIUM SERPL-MCNC: 9.3 MG/DL (ref 8.5–10.1)
CHLORIDE SERPL-SCNC: 105 MMOL/L (ref 97–108)
CHOLEST SERPL-MCNC: 163 MG/DL
CO2 SERPL-SCNC: 29 MMOL/L (ref 21–32)
CREAT SERPL-MCNC: 0.89 MG/DL (ref 0.7–1.3)
CREAT UR-MCNC: 107 MG/DL
DIFFERENTIAL METHOD BLD: ABNORMAL
EOSINOPHIL # BLD: 0.2 K/UL (ref 0–0.4)
EOSINOPHIL NFR BLD: 3 % (ref 0–7)
ERYTHROCYTE [DISTWIDTH] IN BLOOD BY AUTOMATED COUNT: 14.6 % (ref 11.5–14.5)
EST. AVERAGE GLUCOSE BLD GHB EST-MCNC: 194 MG/DL
GLOBULIN SER CALC-MCNC: 3.4 G/DL (ref 2–4)
GLUCOSE SERPL-MCNC: 134 MG/DL (ref 65–100)
HBA1C MFR BLD: 8.4 % (ref 4–5.6)
HCT VFR BLD AUTO: 48.2 % (ref 36.6–50.3)
HDLC SERPL-MCNC: 65 MG/DL
HDLC SERPL: 2.5 {RATIO} (ref 0–5)
HGB BLD-MCNC: 14.7 G/DL (ref 12.1–17)
IMM GRANULOCYTES # BLD AUTO: 0.1 K/UL (ref 0–0.04)
IMM GRANULOCYTES NFR BLD AUTO: 1 % (ref 0–0.5)
LDLC SERPL CALC-MCNC: 83.8 MG/DL (ref 0–100)
LIPID PROFILE,FLP: NORMAL
LYMPHOCYTES # BLD: 2.2 K/UL (ref 0.8–3.5)
LYMPHOCYTES NFR BLD: 31 % (ref 12–49)
MCH RBC QN AUTO: 26.2 PG (ref 26–34)
MCHC RBC AUTO-ENTMCNC: 30.5 G/DL (ref 30–36.5)
MCV RBC AUTO: 85.8 FL (ref 80–99)
MICROALBUMIN UR-MCNC: 0.61 MG/DL
MICROALBUMIN/CREAT UR-RTO: 6 MG/G (ref 0–30)
MONOCYTES # BLD: 0.6 K/UL (ref 0–1)
MONOCYTES NFR BLD: 9 % (ref 5–13)
NEUTS SEG # BLD: 3.9 K/UL (ref 1.8–8)
NEUTS SEG NFR BLD: 55 % (ref 32–75)
NRBC # BLD: 0 K/UL (ref 0–0.01)
NRBC BLD-RTO: 0 PER 100 WBC
PLATELET # BLD AUTO: 261 K/UL (ref 150–400)
PMV BLD AUTO: 11.1 FL (ref 8.9–12.9)
POTASSIUM SERPL-SCNC: 3.8 MMOL/L (ref 3.5–5.1)
PROT SERPL-MCNC: 7 G/DL (ref 6.4–8.2)
RBC # BLD AUTO: 5.62 M/UL (ref 4.1–5.7)
SODIUM SERPL-SCNC: 138 MMOL/L (ref 136–145)
TRIGL SERPL-MCNC: 71 MG/DL (ref ?–150)
VLDLC SERPL CALC-MCNC: 14.2 MG/DL
WBC # BLD AUTO: 7.1 K/UL (ref 4.1–11.1)

## 2020-05-08 PROCEDURE — 72050 X-RAY EXAM NECK SPINE 4/5VWS: CPT

## 2020-05-20 RX ORDER — EMPAGLIFLOZIN 25 MG/1
TABLET, FILM COATED ORAL
Qty: 30 TAB | Refills: 5 | Status: SHIPPED | OUTPATIENT
Start: 2020-05-20 | End: 2020-12-27

## 2020-05-28 ENCOUNTER — DOCUMENTATION ONLY (OUTPATIENT)
Dept: INTERNAL MEDICINE CLINIC | Age: 60
End: 2020-05-28

## 2020-05-28 ENCOUNTER — OFFICE VISIT (OUTPATIENT)
Dept: PRIMARY CARE CLINIC | Age: 60
End: 2020-05-28

## 2020-05-28 DIAGNOSIS — R50.9 FEBRILE ILLNESS: Primary | ICD-10-CM

## 2020-05-28 NOTE — PROGRESS NOTES
Spoke with patient states that he has a cough since Monday and a a temp of 99. 8. patient states that he went to [facility] on Sunday states he wore a mask to [facility] but did not wear it the entire time he was there.   Advised [facility] and treat

## 2020-05-30 LAB — SARS-COV-2, NAA: NOT DETECTED

## 2020-06-15 RX ORDER — LOSARTAN POTASSIUM AND HYDROCHLOROTHIAZIDE 25; 100 MG/1; MG/1
TABLET ORAL
Qty: 30 TAB | Refills: 5 | Status: SHIPPED | OUTPATIENT
Start: 2020-06-15 | End: 2021-01-24

## 2020-06-28 RX ORDER — METFORMIN HYDROCHLORIDE 500 MG/1
TABLET, EXTENDED RELEASE ORAL
Qty: 120 TAB | Refills: 5 | Status: SHIPPED | OUTPATIENT
Start: 2020-06-28 | End: 2021-03-07

## 2020-08-19 RX ORDER — INSULIN GLARGINE 300 U/ML
INJECTION, SOLUTION SUBCUTANEOUS
Qty: 7.5 ML | Refills: 3 | Status: SHIPPED | OUTPATIENT
Start: 2020-08-19 | End: 2021-03-10

## 2020-08-23 RX ORDER — LIRAGLUTIDE 6 MG/ML
INJECTION SUBCUTANEOUS
Qty: 6 ML | Refills: 5 | Status: SHIPPED | OUTPATIENT
Start: 2020-08-23 | End: 2021-05-03

## 2020-12-01 ENCOUNTER — OFFICE VISIT (OUTPATIENT)
Dept: INTERNAL MEDICINE CLINIC | Age: 60
End: 2020-12-01
Payer: COMMERCIAL

## 2020-12-01 VITALS
OXYGEN SATURATION: 99 % | HEIGHT: 66 IN | TEMPERATURE: 96.3 F | DIASTOLIC BLOOD PRESSURE: 59 MMHG | SYSTOLIC BLOOD PRESSURE: 136 MMHG | HEART RATE: 55 BPM | WEIGHT: 288.8 LBS | BODY MASS INDEX: 46.41 KG/M2 | RESPIRATION RATE: 18 BRPM

## 2020-12-01 DIAGNOSIS — Z79.899 HIGH RISK MEDICATION USE: ICD-10-CM

## 2020-12-01 DIAGNOSIS — Z23 ENCOUNTER FOR IMMUNIZATION: Primary | ICD-10-CM

## 2020-12-01 DIAGNOSIS — E66.01 MORBID OBESITY (HCC): ICD-10-CM

## 2020-12-01 DIAGNOSIS — E11.9 TYPE 2 DIABETES MELLITUS WITHOUT COMPLICATION, WITH LONG-TERM CURRENT USE OF INSULIN (HCC): ICD-10-CM

## 2020-12-01 DIAGNOSIS — Z00.00 GENERAL MEDICAL EXAMINATION: ICD-10-CM

## 2020-12-01 DIAGNOSIS — Z79.4 TYPE 2 DIABETES MELLITUS WITHOUT COMPLICATION, WITH LONG-TERM CURRENT USE OF INSULIN (HCC): ICD-10-CM

## 2020-12-01 LAB
ALBUMIN SERPL-MCNC: 3.6 G/DL (ref 3.5–5)
ALBUMIN/GLOB SERPL: 1.1 {RATIO} (ref 1.1–2.2)
ALP SERPL-CCNC: 90 U/L (ref 45–117)
ALT SERPL-CCNC: 29 U/L (ref 12–78)
ANION GAP SERPL CALC-SCNC: 9 MMOL/L (ref 5–15)
AST SERPL-CCNC: 19 U/L (ref 15–37)
BASOPHILS # BLD: 0.1 K/UL (ref 0–0.1)
BASOPHILS NFR BLD: 1 % (ref 0–1)
BILIRUB SERPL-MCNC: 0.5 MG/DL (ref 0.2–1)
BUN SERPL-MCNC: 17 MG/DL (ref 6–20)
BUN/CREAT SERPL: 19 (ref 12–20)
CALCIUM SERPL-MCNC: 9.2 MG/DL (ref 8.5–10.1)
CHLORIDE SERPL-SCNC: 104 MMOL/L (ref 97–108)
CHOLEST SERPL-MCNC: 164 MG/DL
CO2 SERPL-SCNC: 26 MMOL/L (ref 21–32)
CREAT SERPL-MCNC: 0.89 MG/DL (ref 0.7–1.3)
DIFFERENTIAL METHOD BLD: ABNORMAL
EOSINOPHIL # BLD: 0.1 K/UL (ref 0–0.4)
EOSINOPHIL NFR BLD: 2 % (ref 0–7)
ERYTHROCYTE [DISTWIDTH] IN BLOOD BY AUTOMATED COUNT: 14.8 % (ref 11.5–14.5)
EST. AVERAGE GLUCOSE BLD GHB EST-MCNC: 189 MG/DL
GLOBULIN SER CALC-MCNC: 3.3 G/DL (ref 2–4)
GLUCOSE SERPL-MCNC: 116 MG/DL (ref 65–100)
HBA1C MFR BLD: 8.2 % (ref 4–5.6)
HCT VFR BLD AUTO: 48.1 % (ref 36.6–50.3)
HDLC SERPL-MCNC: 55 MG/DL
HDLC SERPL: 3 {RATIO} (ref 0–5)
HGB BLD-MCNC: 15 G/DL (ref 12.1–17)
IMM GRANULOCYTES # BLD AUTO: 0 K/UL (ref 0–0.04)
IMM GRANULOCYTES NFR BLD AUTO: 0 % (ref 0–0.5)
LDLC SERPL CALC-MCNC: 94.2 MG/DL (ref 0–100)
LIPID PROFILE,FLP: NORMAL
LYMPHOCYTES # BLD: 2.2 K/UL (ref 0.8–3.5)
LYMPHOCYTES NFR BLD: 28 % (ref 12–49)
MCH RBC QN AUTO: 27 PG (ref 26–34)
MCHC RBC AUTO-ENTMCNC: 31.2 G/DL (ref 30–36.5)
MCV RBC AUTO: 86.7 FL (ref 80–99)
MONOCYTES # BLD: 0.6 K/UL (ref 0–1)
MONOCYTES NFR BLD: 7 % (ref 5–13)
NEUTS SEG # BLD: 4.8 K/UL (ref 1.8–8)
NEUTS SEG NFR BLD: 62 % (ref 32–75)
NRBC # BLD: 0 K/UL (ref 0–0.01)
NRBC BLD-RTO: 0 PER 100 WBC
PLATELET # BLD AUTO: 263 K/UL (ref 150–400)
PMV BLD AUTO: 11.5 FL (ref 8.9–12.9)
POTASSIUM SERPL-SCNC: 3.7 MMOL/L (ref 3.5–5.1)
PROT SERPL-MCNC: 6.9 G/DL (ref 6.4–8.2)
PSA SERPL-MCNC: 6 NG/ML (ref 0.01–4)
RBC # BLD AUTO: 5.55 M/UL (ref 4.1–5.7)
SODIUM SERPL-SCNC: 139 MMOL/L (ref 136–145)
TRIGL SERPL-MCNC: 74 MG/DL (ref ?–150)
VLDLC SERPL CALC-MCNC: 14.8 MG/DL
WBC # BLD AUTO: 7.8 K/UL (ref 4.1–11.1)

## 2020-12-01 PROCEDURE — 90732 PPSV23 VACC 2 YRS+ SUBQ/IM: CPT | Performed by: INTERNAL MEDICINE

## 2020-12-01 PROCEDURE — 99396 PREV VISIT EST AGE 40-64: CPT | Performed by: INTERNAL MEDICINE

## 2020-12-01 PROCEDURE — 90471 IMMUNIZATION ADMIN: CPT | Performed by: INTERNAL MEDICINE

## 2020-12-01 RX ORDER — ATORVASTATIN CALCIUM 20 MG/1
20 TABLET, FILM COATED ORAL DAILY
Qty: 90 TAB | Refills: 3 | Status: SHIPPED | OUTPATIENT
Start: 2020-12-01 | End: 2021-05-03 | Stop reason: ALTCHOICE

## 2020-12-01 RX ORDER — ZOSTER VACCINE RECOMBINANT, ADJUVANTED 50 MCG/0.5
0.5 KIT INTRAMUSCULAR ONCE
Qty: 0.5 ML | Refills: 1 | Status: SHIPPED | OUTPATIENT
Start: 2020-12-01 | End: 2020-12-01

## 2020-12-01 RX ORDER — LOVASTATIN 40 MG/1
TABLET ORAL
Qty: 90 TAB | Refills: 3 | OUTPATIENT
Start: 2020-12-01

## 2020-12-01 NOTE — PROGRESS NOTES
Joyce Goldsmith is here for complete health maintenance physical exam and screening. he does not have other concerns. questions answered    Health maintenance hx includes:  Exercise: not active. Form of exercise: not much for last 7 months   Diet: not attempting to follow a low sodium diet  unknown occupation  Cancer screening:    Colon cancer screening:  Last Colonoscopy: 2018 and was abnormal: polyps 2   Prostate cancer screening: PSA and/or JONH:   Lab Results   Component Value Date/Time    Prostate Specific Ag 1.9 03/28/2011 10:49 AM          Lab Results   Component Value Date/Time    Cholesterol, total 163 05/08/2020 07:56 AM    HDL Cholesterol 65 05/08/2020 07:56 AM    LDL, calculated 83.8 05/08/2020 07:56 AM    VLDL, calculated 14.2 05/08/2020 07:56 AM    Triglyceride 71 05/08/2020 07:56 AM    CHOL/HDL Ratio 2.5 05/08/2020 07:56 AM       Lab Results   Component Value Date/Time    Glucose 134 (H) 05/08/2020 07:56 AM    Glucose  11/25/2013 09:10 AM         Immunizations:     Immunization History   Administered Date(s) Administered    (RETIRED) Pneumococcal Vaccine (Unspecified Type) 08/02/2008    Influenza Vaccine 10/23/2019    Influenza Vaccine (Quad) PF (>6 Mo Flulaval, Fluarix, and >3 Yrs Afluria, Fluzone [id number]) 10/19/2020    Pneumococcal Polysaccharide (PPSV-23) 12/01/2020    TB Skin Test (PPD) Intradermal 10/12/2015    Tdap 07/31/2018      Immunization status: missing doses of pneumovax and shingrix.        Social History     Socioeconomic History    Marital status: SINGLE     Spouse name: Not on file    Number of children: Not on file    Years of education: Not on file    Highest education level: Not on file   Occupational History    Not on file   Social Needs    Financial resource strain: Not on file    Food insecurity     Worry: Not on file     Inability: Not on file    Transportation needs     Medical: Not on file     Non-medical: Not on file   Tobacco Use    Smoking status: Never Smoker    Smokeless tobacco: Never Used   Substance and Sexual Activity    Alcohol use: No    Drug use: No    Sexual activity: Not Currently   Lifestyle    Physical activity     Days per week: Not on file     Minutes per session: Not on file    Stress: Not on file   Relationships    Social connections     Talks on phone: Not on file     Gets together: Not on file     Attends Shinto service: Not on file     Active member of club or organization: Not on file     Attends meetings of clubs or organizations: Not on file     Relationship status: Not on file    Intimate partner violence     Fear of current or ex partner: Not on file     Emotionally abused: Not on file     Physically abused: Not on file     Forced sexual activity: Not on file   Other Topics Concern    Not on file   Social History Narrative    Not on file     Past Surgical History:   Procedure Laterality Date    ENDOSCOPY, COLON, DIAGNOSTIC      HX APPENDECTOMY       Family History   Problem Relation Age of Onset    Cancer Mother     Cancer Father     Heart Disease Father      Current Outpatient Medications on File Prior to Visit   Medication Sig Dispense Refill    Victoza 2-Denny 0.6 mg/0.1 mL (18 mg/3 mL) pnij ADMINISTER 0.6 MG UNDER THE SKIN EVERY DAY 6 mL 5    Toujeo SoloStar U-300 Insulin 300 unit/mL (1.5 mL) inpn pen INJECT 30 UNITS SUBCUTANEOUSLY EVERY MORNING BEFORE BREAKFAST 7.5 mL 3    metFORMIN ER (GLUCOPHAGE XR) 500 mg tablet TAKE 4 TABLETS BY MOUTH EVERY DAY AS DIRECTED 120 Tab 5    losartan-hydroCHLOROthiazide (HYZAAR) 100-25 mg per tablet TAKE 1 TABLET BY MOUTH EVERY DAY 30 Tab 5    Jardiance 25 mg tablet TAKE 1 TABLET BY MOUTH DAILY 30 Tab 5    aspirin delayed-release (Adult Low Dose Aspirin) 81 mg tablet Take 81 mg by mouth daily.       glucose blood VI test strips (FreeStyle Lite Strips) strip FreeStyle Lite Strips      BD ULTRA-FINE MINI PEN NEEDLE 31 gauge x 3/16\" ndle USE AS DIRECTED 100 Pen Needle 11    lovastatin (MEVACOR) 40 mg tablet TAKE 1 TABLET BY MOUTH EVERY EVENING 90 Tab 3    wheat dextrin (BENEFIBER SUGAR FREE, DEXTRIN,) 3 gram/3.8 gram powd Take 1 Scoop by mouth daily. 1 g 6    glucose blood VI test strips (FREESTYLE LITE STRIPS) strip Use 2 times a day 100 Strip 5     No current facility-administered medications on file prior to visit. .  Vitals:    12/01/20 1022   BP: (!) 136/59   Pulse: (!) 55   Resp: 18   Temp: (!) 96.3 °F (35.7 °C)   TempSrc: Temporal   SpO2: 99%   Weight: 288 lb 12.8 oz (131 kg)   Height: 5' 6\" (1.676 m)     Wt Readings from Last 3 Encounters:   12/01/20 288 lb 12.8 oz (131 kg)   04/23/20 288 lb (130.6 kg)   11/26/19 288 lb (130.6 kg)     Body mass index is 46.61 kg/m². The physical exam is generally normal. He appears well, alert and oriented x 3, pleasant and cooperative. Vitals as noted. ENT normal, neck supple and free of adenopathy, or masses. No thyromegaly or carotid bruits. Cranial nerves and fundi normal. Lungs are clear to auscultation. Heart sounds are normal, no murmurs, clicks, gallops or rubs. Abdomen is soft, no tenderness, masses or organomegaly. Extremities, peripheral pulses and reflexes are normal.  . Rectal: deferred, not clinically indicated. . Skin is normal without rashes or suspicious lesions. Neuro normal    Weight obesity severe    1. Encounter for immunization  now  - PNEUMOCOCCAL POLYSACCHARIDE VACCINE, 23-VALENT, ADULT OR IMMUNOSUPPRESSED PT DOSE,    2. General medical examination    - CBC WITH AUTOMATED DIFF; Future  - LIPID PANEL; Future  - METABOLIC PANEL, COMPREHENSIVE; Future  - PSA, DIAGNOSTIC (PROSTATE SPECIFIC AG); Future    3. Type 2 diabetes mellitus without complication, with long-term current use of insulin ([facility] [address]  Not doing too well  - CBC WITH AUTOMATED DIFF; Future  - LIPID PANEL; Future  - METABOLIC PANEL, COMPREHENSIVE; Future  - HEMOGLOBIN A1C WITH EAG; Future    4. High risk medication use  All the meds    5.  Morbid obesity ([facility] [address]  Unchanged diet reviewed  Needs dedicated program after covid    covid vaccinations reviewed and priority reviewed and my advice to get vaccinated reviewed

## 2020-12-01 NOTE — PROGRESS NOTES
Chief Complaint   Patient presents with    Complete Physical         1. Have you been to the ER, urgent care clinic since your last visit? Hospitalized since your last visit? No    2. Have you seen or consulted any other health care providers outside of the [address] [place] [address] since your last visit? Include any pap smears or colon screening.  No

## 2020-12-27 RX ORDER — EMPAGLIFLOZIN 25 MG/1
TABLET, FILM COATED ORAL
Qty: 30 TAB | Refills: 5 | Status: SHIPPED | OUTPATIENT
Start: 2020-12-27 | End: 2021-06-16

## 2021-01-24 RX ORDER — LOSARTAN POTASSIUM AND HYDROCHLOROTHIAZIDE 25; 100 MG/1; MG/1
TABLET ORAL
Qty: 30 TAB | Refills: 5 | Status: SHIPPED | OUTPATIENT
Start: 2021-01-24 | End: 2021-07-19

## 2021-03-07 RX ORDER — METFORMIN HYDROCHLORIDE 500 MG/1
TABLET, EXTENDED RELEASE ORAL
Qty: 120 TAB | Refills: 5 | Status: SHIPPED | OUTPATIENT
Start: 2021-03-07 | End: 2021-09-10

## 2021-03-10 RX ORDER — INSULIN GLARGINE 300 U/ML
INJECTION, SOLUTION SUBCUTANEOUS
Qty: 7.5 ML | Refills: 3 | Status: SHIPPED | OUTPATIENT
Start: 2021-03-10 | End: 2021-10-21 | Stop reason: SDUPTHER

## 2021-03-18 ENCOUNTER — APPOINTMENT (OUTPATIENT)
Dept: GENERAL RADIOLOGY | Age: 61
End: 2021-03-18
Attending: EMERGENCY MEDICINE
Payer: COMMERCIAL

## 2021-03-18 ENCOUNTER — HOSPITAL ENCOUNTER (EMERGENCY)
Age: 61
Discharge: HOME | End: 2021-03-19
Attending: EMERGENCY MEDICINE
Payer: COMMERCIAL

## 2021-03-18 DIAGNOSIS — R07.9 ACUTE CHEST PAIN: Primary | ICD-10-CM

## 2021-03-18 LAB
ALBUMIN SERPL-MCNC: 3.7 G/DL (ref 3.5–5)
ALBUMIN/GLOB SERPL: 1 {RATIO} (ref 1.1–2.2)
ALP SERPL-CCNC: 100 U/L (ref 45–117)
ALT SERPL-CCNC: 97 U/L (ref 12–78)
ANION GAP SERPL CALC-SCNC: 7 MMOL/L (ref 5–15)
AST SERPL-CCNC: 62 U/L (ref 15–37)
ATRIAL RATE: 71 BPM
BASOPHILS # BLD: 0.1 K/UL (ref 0–0.1)
BASOPHILS NFR BLD: 1 % (ref 0–1)
BILIRUB SERPL-MCNC: 0.2 MG/DL (ref 0.2–1)
BUN SERPL-MCNC: 11 MG/DL (ref 6–20)
BUN/CREAT SERPL: 12 (ref 12–20)
CALCIUM SERPL-MCNC: 9.4 MG/DL (ref 8.5–10.1)
CALCULATED P AXIS, ECG09: 56 DEGREES
CALCULATED R AXIS, ECG10: -29 DEGREES
CALCULATED T AXIS, ECG11: 3 DEGREES
CHLORIDE SERPL-SCNC: 102 MMOL/L (ref 97–108)
CO2 SERPL-SCNC: 28 MMOL/L (ref 21–32)
CREAT SERPL-MCNC: 0.9 MG/DL (ref 0.7–1.3)
DIAGNOSIS, 93000: NORMAL
DIFFERENTIAL METHOD BLD: ABNORMAL
EOSINOPHIL # BLD: 0.2 K/UL (ref 0–0.4)
EOSINOPHIL NFR BLD: 3 % (ref 0–7)
ERYTHROCYTE [DISTWIDTH] IN BLOOD BY AUTOMATED COUNT: 14.7 % (ref 11.5–14.5)
GLOBULIN SER CALC-MCNC: 3.8 G/DL (ref 2–4)
GLUCOSE SERPL-MCNC: 152 MG/DL (ref 65–100)
HCT VFR BLD AUTO: 46.9 % (ref 36.6–50.3)
HGB BLD-MCNC: 14.9 G/DL (ref 12.1–17)
IMM GRANULOCYTES # BLD AUTO: 0 K/UL (ref 0–0.04)
IMM GRANULOCYTES NFR BLD AUTO: 0 % (ref 0–0.5)
LYMPHOCYTES # BLD: 2.8 K/UL (ref 0.8–3.5)
LYMPHOCYTES NFR BLD: 38 % (ref 12–49)
MCH RBC QN AUTO: 26.7 PG (ref 26–34)
MCHC RBC AUTO-ENTMCNC: 31.8 G/DL (ref 30–36.5)
MCV RBC AUTO: 83.9 FL (ref 80–99)
MONOCYTES # BLD: 0.6 K/UL (ref 0–1)
MONOCYTES NFR BLD: 8 % (ref 5–13)
NEUTS SEG # BLD: 3.8 K/UL (ref 1.8–8)
NEUTS SEG NFR BLD: 50 % (ref 32–75)
NRBC # BLD: 0 K/UL (ref 0–0.01)
NRBC BLD-RTO: 0 PER 100 WBC
P-R INTERVAL, ECG05: 140 MS
PLATELET # BLD AUTO: 250 K/UL (ref 150–400)
PMV BLD AUTO: 10.7 FL (ref 8.9–12.9)
POTASSIUM SERPL-SCNC: 3.8 MMOL/L (ref 3.5–5.1)
PROT SERPL-MCNC: 7.5 G/DL (ref 6.4–8.2)
Q-T INTERVAL, ECG07: 386 MS
QRS DURATION, ECG06: 98 MS
QTC CALCULATION (BEZET), ECG08: 419 MS
RBC # BLD AUTO: 5.59 M/UL (ref 4.1–5.7)
SODIUM SERPL-SCNC: 137 MMOL/L (ref 136–145)
TROPONIN I SERPL-MCNC: <0.05 NG/ML
VENTRICULAR RATE, ECG03: 71 BPM
WBC # BLD AUTO: 7.4 K/UL (ref 4.1–11.1)

## 2021-03-18 PROCEDURE — 80053 COMPREHEN METABOLIC PANEL: CPT

## 2021-03-18 PROCEDURE — 85025 COMPLETE CBC W/AUTO DIFF WBC: CPT

## 2021-03-18 PROCEDURE — 93005 ELECTROCARDIOGRAM TRACING: CPT

## 2021-03-18 PROCEDURE — 71046 X-RAY EXAM CHEST 2 VIEWS: CPT

## 2021-03-18 PROCEDURE — 36415 COLL VENOUS BLD VENIPUNCTURE: CPT

## 2021-03-18 PROCEDURE — 99283 EMERGENCY DEPT VISIT LOW MDM: CPT

## 2021-03-18 PROCEDURE — 84484 ASSAY OF TROPONIN QUANT: CPT

## 2021-03-18 NOTE — ED TRIAGE NOTES
TRIAGE NOTE:  Patient arrives ambulatory with c/o sharp shoot left sided chest pain, that began this morning while patient was sitting at computer working. Patient has not taken anything for the pain.

## 2021-03-19 VITALS
SYSTOLIC BLOOD PRESSURE: 130 MMHG | TEMPERATURE: 97.7 F | RESPIRATION RATE: 18 BRPM | HEART RATE: 65 BPM | OXYGEN SATURATION: 96 % | DIASTOLIC BLOOD PRESSURE: 80 MMHG

## 2021-03-19 LAB — TROPONIN I SERPL-MCNC: <0.05 NG/ML

## 2021-03-19 NOTE — ED PROVIDER NOTES
History of hypertension, diabetes, hyperlipidemia, obesity. He presents with complaints of fleeting chest pain that began this morning. He states that it has been occurring throughout the day. He gets a sharp pain on the left side of his chest that lasts for a second or 2 only. It does not radiate. There are no known exacerbating or relieving factors. He denies that movement, inspiration, or exertion bring the pain on. No dyspnea, nausea, diaphoresis. He remembers getting similar pain \"years ago. \"  He has a remote history of stress test.  He denies any known heart trouble. No history of smoking. He states that his brother has had unknown heart problems.            Past Medical History:   Diagnosis Date    Diabetes ([facility] [address]     Hypercholesterolemia     Hypertension     Morbid obesity ([facility] [address]     Proteinuria        Past Surgical History:   Procedure Laterality Date    ENDOSCOPY, COLON, DIAGNOSTIC      HX APPENDECTOMY           Family History:   Problem Relation Age of Onset    Cancer Mother     Cancer Father     Heart Disease Father        Social History     Socioeconomic History    Marital status: SINGLE     Spouse name: Not on file    Number of children: Not on file    Years of education: Not on file    Highest education level: Not on file   Occupational History    Not on file   Social Needs    Financial resource strain: Not on file    Food insecurity     Worry: Not on file     Inability: Not on file    Transportation needs     Medical: Not on file     Non-medical: Not on file   Tobacco Use    Smoking status: Never Smoker    Smokeless tobacco: Never Used   Substance and Sexual Activity    Alcohol use: No    Drug use: No    Sexual activity: Not Currently   Lifestyle    Physical activity     Days per week: Not on file     Minutes per session: Not on file    Stress: Not on file   Relationships    Social connections     Talks on phone: Not on file     Gets together: Not on file Attends [employer] service: Not on file     Active member of club or organization: Not on file     Attends meetings of clubs or organizations: Not on file     Relationship status: Not on file    Intimate partner violence     Fear of current or ex partner: Not on file     Emotionally abused: Not on file     Physically abused: Not on file     Forced sexual activity: Not on file   Other Topics Concern    Not on file   Social History Narrative    Not on file         ALLERGIES: Patient has no known allergies. Review of Systems   All other systems reviewed and are negative. Vitals:    03/18/21 1752   BP: 133/78   Pulse: 77   Resp: 18   Temp: 97.8 °F (36.6 °C)   SpO2: 99%            Physical Exam  Vitals signs and nursing note reviewed. Constitutional:       Appearance: He is well-developed. Comments: Overweight. HENT:      Head: Normocephalic and atraumatic. Eyes:      Conjunctiva/sclera: Conjunctivae normal.   Neck:      Musculoskeletal: Neck supple. Trachea: No tracheal deviation. Cardiovascular:      Rate and Rhythm: Normal rate and regular rhythm. Heart sounds: Normal heart sounds. No murmur. No friction rub. No gallop. Pulmonary:      Effort: Pulmonary effort is normal.      Breath sounds: Normal breath sounds. Abdominal:      Palpations: Abdomen is soft. Tenderness: There is no abdominal tenderness. Musculoskeletal:         General: No deformity. Skin:     General: Skin is warm and dry. Neurological:      Mental Status: He is alert. Comments: oriented          MDM       Procedures    EKG: Normal sinus rhythm; rate of 71; frequent PACs; normal ST, Micah Thorne MD  10:52 PM    Progress Note:  Results, treatment, and follow up plan have been discussed with patient. Questions were answered.    Radha Lozano MD  12:14 AM    Assessment/plan: Fleeting chest pain -differential diagnosis includes acute coronary syndrome, PE, aortic dissection, musculoskeletal chest pain, GERD/gastritis. Unclear etiology. No exertional component. Reassuring appearance/exam with stable vital signs. CBC, CMP, troponin x2, EKG, chest x-ray okay. Home with PCP/cards follow-up. Return precautions discussed.   Brian Navarro MD  12:15 AM

## 2021-03-22 ENCOUNTER — PATIENT OUTREACH (OUTPATIENT)
Dept: CASE MANAGEMENT | Age: 61
End: 2021-03-22

## 2021-03-22 NOTE — PROGRESS NOTES
03/22/21 Contacted patient for ER follow up and COVID education. Declined speaking with ACM today, states other people have already called him. Will resolve encounter at this time.  CK

## 2021-03-28 RX ORDER — PEN NEEDLE, DIABETIC 31 GX5/16"
NEEDLE, DISPOSABLE MISCELLANEOUS
Qty: 100 PEN NEEDLE | Refills: 5 | Status: SHIPPED | OUTPATIENT
Start: 2021-03-28 | End: 2022-01-12 | Stop reason: SDUPTHER

## 2021-05-03 ENCOUNTER — OFFICE VISIT (OUTPATIENT)
Dept: INTERNAL MEDICINE CLINIC | Age: 61
End: 2021-05-03
Payer: COMMERCIAL

## 2021-05-03 VITALS
TEMPERATURE: 96.9 F | RESPIRATION RATE: 18 BRPM | SYSTOLIC BLOOD PRESSURE: 125 MMHG | DIASTOLIC BLOOD PRESSURE: 70 MMHG | WEIGHT: 289.8 LBS | BODY MASS INDEX: 46.57 KG/M2 | HEART RATE: 59 BPM | HEIGHT: 66 IN

## 2021-05-03 DIAGNOSIS — I49.1 PAC (PREMATURE ATRIAL CONTRACTION): ICD-10-CM

## 2021-05-03 DIAGNOSIS — E78.5 DYSLIPIDEMIA, GOAL LDL BELOW 100: ICD-10-CM

## 2021-05-03 DIAGNOSIS — I10 ESSENTIAL HYPERTENSION: ICD-10-CM

## 2021-05-03 DIAGNOSIS — E11.65 UNCONTROLLED TYPE 2 DIABETES MELLITUS WITH HYPERGLYCEMIA (HCC): Primary | ICD-10-CM

## 2021-05-03 PROCEDURE — 99214 OFFICE O/P EST MOD 30 MIN: CPT | Performed by: INTERNAL MEDICINE

## 2021-05-03 RX ORDER — BLOOD-GLUCOSE METER
KIT MISCELLANEOUS
Qty: 100 STRIP | Refills: 5 | Status: SHIPPED | OUTPATIENT
Start: 2021-05-03

## 2021-05-03 RX ORDER — LIRAGLUTIDE 6 MG/ML
1.2 INJECTION SUBCUTANEOUS DAILY
Qty: 6 ML | Refills: 5 | Status: SHIPPED | OUTPATIENT
Start: 2021-05-03 | End: 2021-06-28

## 2021-05-03 NOTE — PROGRESS NOTES
Chief Complaint   Patient presents with    Blood Pressure Check     follow up    Diabetes    Cholesterol Problem         1. Have you been to the ER, urgent care clinic since your last visit? Hospitalized since your last visit? No    2. Have you seen or consulted any other health care providers outside of the [address] [place] [address] since your last visit? Include any pap smears or colon screening.  No

## 2021-05-03 NOTE — PROGRESS NOTES
SUBJECTIVE: Alejandra Dodge is a 61 y.o. male seen for a follow up visit; he has diabetes, hypertension and hyperlipidemia. Current Outpatient Medications   Medication Sig Dispense Refill    liraglutide (Victoza 2-Denny) 0.6 mg/0.1 mL (18 mg/3 mL) pnij 1.2 mg by SubCUTAneous route daily. 6 mL 5    glucose blood VI test strips (FreeStyle Lite Strips) strip Use 2 times a day 100 Strip 5    BD Ultra-Fine Mini Pen Needle 31 gauge x 3/16\" ndle USE AS DIRECTED 100 Pen Needle 5    Toujeo SoloStar U-300 Insulin 300 unit/mL (1.5 mL) inpn pen INJECT 30 UNITS SUBCUTANEOUSLY EVERY MORNING BEFORE BREAKFAST 7.5 mL 3    metFORMIN ER (GLUCOPHAGE XR) 500 mg tablet TAKE 4 TABLETS BY MOUTH EVERY DAY AS DIRECTED 120 Tab 5    losartan-hydroCHLOROthiazide (HYZAAR) 100-25 mg per tablet TAKE 1 TABLET BY MOUTH EVERY DAY 30 Tab 5    Jardiance 25 mg tablet TAKE 1 TABLET BY MOUTH DAILY 30 Tab 5    aspirin delayed-release (Adult Low Dose Aspirin) 81 mg tablet Take 81 mg by mouth daily.  lovastatin (MEVACOR) 40 mg tablet TAKE 1 TABLET BY MOUTH EVERY EVENING 90 Tab 3    wheat dextrin (BENEFIBER SUGAR FREE, DEXTRIN,) 3 gram/3.8 gram powd Take 1 Scoop by mouth daily.  1 g 6     Patient Active Problem List   Diagnosis Code    Hypertension I10    Morbid obesity ([facility] [address] E66.01    Diabetes ([facility] [address] E11.9    Proteinuria R80.9    Other and unspecified disc disorder of lumbar region M51.9    Type II diabetes mellitus, uncontrolled ([facility] [address] E11.65    Dyslipidemia, goal LDL below 100 E78.5    Primary osteoarthritis of one knee M17.10    PAC (premature atrial contraction) I49.1     System Review: Cardiovascular ROS - taking medications as instructed, no medication side effects noted, no TIA's, no chest pain on exertion, no dyspnea on exertion, no swelling of ankles, Diabetic ROS - medication compliance: compliant all of the time, diabetic diet compliance: noncompliant some of the time, home glucose monitoring: is performed sporadically, fasting values range 120, nonfasting values range 180. New concerns: had pal;pitation seen er labds ok saw lots of Apc`s nothing else  No chest pain or pressures  Reviewed all sugars < 200  Feels O)K now. OBJECTIVE:  Visit Vitals  /70   Pulse (!) 59   Temp 96.9 °F (36.1 °C) (Temporal)   Resp 18   Ht 5' 6\" (1.676 m)   Wt 289 lb 12.8 oz (131.5 kg)   BMI 46.77 kg/m²      Appearance: alert, well appearing, and in no distress, oriented to person, place, and time and overweight. General exam: CVS exam BP noted to be well controlled today in office, CVS exam  - S1 and S2 normal, no murmurs noted, no gallops noted, some premature beats noted. Lab review: labs are reviewed, up to date and normal.     ASSESSMENT:  diabetes borderline controlled, hypertension no significant medication side effects noted, borderline controlled, hyperlipidemia stable. PLAN:  lab results and schedule of future lab studies reviewed with patient  the following changes are made - increase victoza dose. 1. Uncontrolled type 2 diabetes mellitus with hyperglycemia (HCC)  Try new dose victoza  - HEMOGLOBIN A1C WITH EAG; Future  - LIPID PANEL; Future    2. PAC (premature atrial contraction)  Reassure not serious    3. Dyslipidemia, goal LDL below 100  Review  labs    4.  Essential hypertension  Controlled well    Needs a gym or better exercise plan

## 2021-06-13 RX ORDER — LOVASTATIN 40 MG/1
TABLET ORAL
Qty: 90 TABLET | Refills: 3 | Status: SHIPPED | OUTPATIENT
Start: 2021-06-13 | End: 2022-03-07

## 2021-06-16 RX ORDER — EMPAGLIFLOZIN 25 MG/1
TABLET, FILM COATED ORAL
Qty: 30 TABLET | Refills: 5 | Status: SHIPPED | OUTPATIENT
Start: 2021-06-16 | End: 2021-12-08

## 2021-06-26 ENCOUNTER — HOSPITAL ENCOUNTER (EMERGENCY)
Age: 61
Discharge: HOME | End: 2021-06-26
Attending: EMERGENCY MEDICINE
Payer: COMMERCIAL

## 2021-06-26 VITALS
SYSTOLIC BLOOD PRESSURE: 139 MMHG | HEIGHT: 66 IN | TEMPERATURE: 96.9 F | RESPIRATION RATE: 18 BRPM | HEART RATE: 73 BPM | OXYGEN SATURATION: 98 % | WEIGHT: 289 LBS | BODY MASS INDEX: 46.45 KG/M2 | DIASTOLIC BLOOD PRESSURE: 62 MMHG

## 2021-06-26 DIAGNOSIS — E87.6 HYPOKALEMIA: ICD-10-CM

## 2021-06-26 DIAGNOSIS — R19.7 DIARRHEA, UNSPECIFIED TYPE: ICD-10-CM

## 2021-06-26 DIAGNOSIS — R11.2 NAUSEA AND VOMITING, INTRACTABILITY OF VOMITING NOT SPECIFIED, UNSPECIFIED VOMITING TYPE: Primary | ICD-10-CM

## 2021-06-26 LAB
ALBUMIN SERPL-MCNC: 3.6 G/DL (ref 3.5–5)
ALBUMIN/GLOB SERPL: 0.9 {RATIO} (ref 1.1–2.2)
ALP SERPL-CCNC: 103 U/L (ref 45–117)
ALT SERPL-CCNC: 36 U/L (ref 12–78)
ANION GAP SERPL CALC-SCNC: 8 MMOL/L (ref 5–15)
AST SERPL-CCNC: 21 U/L (ref 15–37)
BASOPHILS # BLD: 0 K/UL (ref 0–0.1)
BASOPHILS NFR BLD: 0 % (ref 0–1)
BILIRUB SERPL-MCNC: 0.3 MG/DL (ref 0.2–1)
BUN SERPL-MCNC: 15 MG/DL (ref 6–20)
BUN/CREAT SERPL: 16 (ref 12–20)
CALCIUM SERPL-MCNC: 9 MG/DL (ref 8.5–10.1)
CHLORIDE SERPL-SCNC: 103 MMOL/L (ref 97–108)
CO2 SERPL-SCNC: 27 MMOL/L (ref 21–32)
COMMENT, HOLDF: NORMAL
CREAT SERPL-MCNC: 0.95 MG/DL (ref 0.7–1.3)
DIFFERENTIAL METHOD BLD: NORMAL
EOSINOPHIL # BLD: 0.1 K/UL (ref 0–0.4)
EOSINOPHIL NFR BLD: 1 % (ref 0–7)
ERYTHROCYTE [DISTWIDTH] IN BLOOD BY AUTOMATED COUNT: 14.3 % (ref 11.5–14.5)
GLOBULIN SER CALC-MCNC: 3.9 G/DL (ref 2–4)
GLUCOSE SERPL-MCNC: 229 MG/DL (ref 65–100)
HCT VFR BLD AUTO: 48.1 % (ref 36.6–50.3)
HGB BLD-MCNC: 15.4 G/DL (ref 12.1–17)
IMM GRANULOCYTES # BLD AUTO: 0 K/UL (ref 0–0.04)
IMM GRANULOCYTES NFR BLD AUTO: 0 % (ref 0–0.5)
LIPASE SERPL-CCNC: 104 U/L (ref 73–393)
LYMPHOCYTES # BLD: 1.9 K/UL (ref 0.8–3.5)
LYMPHOCYTES NFR BLD: 20 % (ref 12–49)
MCH RBC QN AUTO: 27.1 PG (ref 26–34)
MCHC RBC AUTO-ENTMCNC: 32 G/DL (ref 30–36.5)
MCV RBC AUTO: 84.5 FL (ref 80–99)
MONOCYTES # BLD: 0.8 K/UL (ref 0–1)
MONOCYTES NFR BLD: 8 % (ref 5–13)
NEUTS SEG # BLD: 6.8 K/UL (ref 1.8–8)
NEUTS SEG NFR BLD: 71 % (ref 32–75)
NRBC # BLD: 0 K/UL (ref 0–0.01)
NRBC BLD-RTO: 0 PER 100 WBC
PLATELET # BLD AUTO: 270 K/UL (ref 150–400)
PMV BLD AUTO: 10.1 FL (ref 8.9–12.9)
POTASSIUM SERPL-SCNC: 3.3 MMOL/L (ref 3.5–5.1)
PROT SERPL-MCNC: 7.5 G/DL (ref 6.4–8.2)
RBC # BLD AUTO: 5.69 M/UL (ref 4.1–5.7)
SAMPLES BEING HELD,HOLD: NORMAL
SODIUM SERPL-SCNC: 138 MMOL/L (ref 136–145)
WBC # BLD AUTO: 9.7 K/UL (ref 4.1–11.1)

## 2021-06-26 PROCEDURE — 83690 ASSAY OF LIPASE: CPT

## 2021-06-26 PROCEDURE — 96374 THER/PROPH/DIAG INJ IV PUSH: CPT

## 2021-06-26 PROCEDURE — 74011250637 HC RX REV CODE- 250/637: Performed by: NURSE PRACTITIONER

## 2021-06-26 PROCEDURE — 36415 COLL VENOUS BLD VENIPUNCTURE: CPT

## 2021-06-26 PROCEDURE — 99283 EMERGENCY DEPT VISIT LOW MDM: CPT

## 2021-06-26 PROCEDURE — 74011250636 HC RX REV CODE- 250/636: Performed by: NURSE PRACTITIONER

## 2021-06-26 PROCEDURE — 80053 COMPREHEN METABOLIC PANEL: CPT

## 2021-06-26 PROCEDURE — 85025 COMPLETE CBC W/AUTO DIFF WBC: CPT

## 2021-06-26 RX ORDER — POTASSIUM CHLORIDE 750 MG/1
40 TABLET, FILM COATED, EXTENDED RELEASE ORAL
Status: COMPLETED | OUTPATIENT
Start: 2021-06-26 | End: 2021-06-26

## 2021-06-26 RX ORDER — ONDANSETRON 2 MG/ML
4 INJECTION INTRAMUSCULAR; INTRAVENOUS
Status: COMPLETED | OUTPATIENT
Start: 2021-06-26 | End: 2021-06-26

## 2021-06-26 RX ORDER — ONDANSETRON 4 MG/1
4 TABLET, ORALLY DISINTEGRATING ORAL
Qty: 20 TABLET | Refills: 0 | Status: SHIPPED | OUTPATIENT
Start: 2021-06-26 | End: 2022-05-02

## 2021-06-26 RX ADMIN — ONDANSETRON 4 MG: 2 INJECTION INTRAMUSCULAR; INTRAVENOUS at 12:04

## 2021-06-26 RX ADMIN — SODIUM CHLORIDE 1000 ML: 9 INJECTION, SOLUTION INTRAVENOUS at 12:04

## 2021-06-26 RX ADMIN — POTASSIUM CHLORIDE 40 MEQ: 750 TABLET, FILM COATED, EXTENDED RELEASE ORAL at 13:49

## 2021-06-26 NOTE — ED PROVIDER NOTES
This is a 61year old male who presents ambulatory to the emergency room complaints of nausea, vomiting and diarrhea. Patient states 4 days ago he started having the symptoms. Was seen at [facility] and symptoms did resolve but then stated today they came back so he came to the emergency room for evaluation. Patient denies any abdominal pain. Denies any chest pain, shortness of breath, dizziness, fevers or chills. No urinary frequency, urgency, hematuria. History of appendectomy. There are no further complaints at this time. Windy Betancourt MD  Past Medical History:  No date: Diabetes [facility])  No date: Hypercholesterolemia  No date: Hypertension  No date:  Morbid obesity ([facility] [address]  No date: Proteinuria  Past Surgical History:  No date: ENDOSCOPY, COLON, DIAGNOSTIC  No date: HX APPENDECTOMY             Past Medical History:   Diagnosis Date    Diabetes ([facility] [address]     Hypercholesterolemia     Hypertension     Morbid obesity ([facility] [address]     Proteinuria        Past Surgical History:   Procedure Laterality Date    ENDOSCOPY, COLON, DIAGNOSTIC      HX APPENDECTOMY           Family History:   Problem Relation Age of Onset    Cancer Mother     Cancer Father     Heart Disease Father        Social History     Socioeconomic History    Marital status: SINGLE     Spouse name: Not on file    Number of children: Not on file    Years of education: Not on file    Highest education level: Not on file   Occupational History    Not on file   Tobacco Use    Smoking status: Never Smoker    Smokeless tobacco: Never Used   Substance and Sexual Activity    Alcohol use: No    Drug use: No    Sexual activity: Not Currently   Other Topics Concern    Not on file   Social History Narrative    Not on file     Social Determinants of Health     Financial Resource Strain:     Difficulty of Paying Living Expenses:    Food Insecurity:     Worried About Running Out of Food in the Last Year:     [address] in the Last Year: Transportation Needs:     Lack of Transportation (Medical):  Lack of Transportation (Non-Medical):    Physical Activity:     Days of Exercise per Week:     Minutes of Exercise per Session:    Stress:     Feeling of Stress :    Social Connections:     Frequency of Communication with Friends and Family:     Frequency of Social Gatherings with Friends and Family:     Attends Jain Services:     Active Member of Clubs or Organizations:     Attends Club or Organization Meetings:     Marital Status:    Intimate Partner Violence:     Fear of Current or Ex-Partner:     Emotionally Abused:     Physically Abused:     Sexually Abused: ALLERGIES: Patient has no known allergies. Review of Systems   Constitutional: Negative for activity change, appetite change, chills, fatigue and fever. HENT: Negative for congestion, ear discharge, ear pain, sinus pressure, sinus pain, sore throat and trouble swallowing. Eyes: Negative for photophobia, pain, redness, itching and visual disturbance. Respiratory: Negative for chest tightness and shortness of breath. Cardiovascular: Negative for chest pain and palpitations. Gastrointestinal: Positive for diarrhea, nausea and vomiting. Negative for abdominal distention and abdominal pain. Endocrine: Negative. Genitourinary: Negative for difficulty urinating, frequency and urgency. Musculoskeletal: Negative for back pain, neck pain and neck stiffness. Skin: Negative for color change, pallor, rash and wound. Allergic/Immunologic: Negative. Neurological: Negative for dizziness, syncope, weakness and headaches. Hematological: Does not bruise/bleed easily. Psychiatric/Behavioral: Negative for behavioral problems. The patient is not nervous/anxious.         Vitals:    06/26/21 1123   BP: 139/62   Pulse: 73   Resp: 18   Temp: 96.9 °F (36.1 °C)   SpO2: 98%   Weight: 131.1 kg (289 lb)   Height: 5' 6\" (1.676 m)            Physical Exam  Vitals and nursing note reviewed. Constitutional:       General: He is not in acute distress. Appearance: Normal appearance. He is well-developed. He is not ill-appearing. HENT:      Head: Normocephalic and atraumatic. Right Ear: External ear normal.      Left Ear: External ear normal.      Nose: Nose normal.      Mouth/Throat:      Mouth: Mucous membranes are moist.   Eyes:      General:         Right eye: No discharge. Left eye: No discharge. Conjunctiva/sclera: Conjunctivae normal.      Pupils: Pupils are equal, round, and reactive to light. Neck:      Vascular: No JVD. Trachea: No tracheal deviation. Cardiovascular:      Rate and Rhythm: Normal rate and regular rhythm. Pulses: Normal pulses. Heart sounds: Normal heart sounds. No murmur heard. No gallop. Pulmonary:      Effort: Pulmonary effort is normal. No respiratory distress. Breath sounds: Normal breath sounds. No wheezing or rales. Chest:      Chest wall: No tenderness. Abdominal:      General: Bowel sounds are normal. There is no distension. Palpations: Abdomen is soft. Tenderness: There is no abdominal tenderness. There is no guarding or rebound. Genitourinary:     Comments: Negative    Musculoskeletal:         General: No tenderness. Normal range of motion. Cervical back: Normal range of motion and neck supple. Skin:     General: Skin is warm and dry. Capillary Refill: Capillary refill takes less than 2 seconds. Coloration: Skin is not pale. Findings: No erythema or rash. Neurological:      General: No focal deficit present. Mental Status: He is alert and oriented to person, place, and time. Psychiatric:         Mood and Affect: Mood normal.         Behavior: Behavior normal.         Thought Content:  Thought content normal.         Judgment: Judgment normal.          MDM  Number of Diagnoses or Management Options  Diarrhea, unspecified type: new and requires workup  Hypokalemia: new and requires workup  Nausea and vomiting, intractability of vomiting not specified, unspecified vomiting type: new and requires workup  Diagnosis management comments: Differential diagnosis includes urinary tract infection, gastroenteritis, electrolyte imbalance and others. Discharged home and follow-up with PCP. Return to the emergency room with worsening symptoms. Patient in agreement plan of care. Amount and/or Complexity of Data Reviewed  Clinical lab tests: ordered and reviewed         Labs Reviewed   METABOLIC PANEL, COMPREHENSIVE - Abnormal; Notable for the following components:       Result Value    Potassium 3.3 (*)     Glucose 229 (*)     A-G Ratio 0.9 (*)     All other components within normal limits   CBC WITH AUTOMATED DIFF   LIPASE   SAMPLES BEING HELD     No results found. 1:37 PM  Pt has been reexamined. Pt has no new complaints, changes or physical findings. Care plan outlined and precautions discussed. All available results were reviewed with pt. All medications were reviewed with pt. All of pt's questions and concerns were addressed. Pt agrees to F/U as instructed and agrees to return to ED upon further deterioration. Pt is ready to go home. Carlos Boo NP    Please note that this dictation was completed with [employer], the [employer] voice recognition software. Quite often unanticipated grammatical, syntax, homophones, and other interpretive errors are inadvertently transcribed by the computer software. Please disregard these errors. Please excuse any errors that have escaped final proofreading. Thank you.     Procedures

## 2021-06-26 NOTE — ED TRIAGE NOTES
Pt reports nausea, vomiting, and diarrhea onset 4 days ago. Seen at [facility] and sx resolved but says his sx returned today so he came to the ED for further evaluation. Denies any pain. Hx of appendectomy but denies any other abdominal surgeries. Denies fevers.

## 2021-06-26 NOTE — ED NOTES
NP reviewed discharge instructions and options with patient; patient verbalized understanding. Pt. Ambulated to exit without difficulty and in no signs of acute distress. Patient was counseled on any medications prescribed at discharge and will follow up as discussed.

## 2021-06-28 ENCOUNTER — OFFICE VISIT (OUTPATIENT)
Dept: INTERNAL MEDICINE CLINIC | Age: 61
End: 2021-06-28
Payer: COMMERCIAL

## 2021-06-28 ENCOUNTER — PATIENT OUTREACH (OUTPATIENT)
Dept: CASE MANAGEMENT | Age: 61
End: 2021-06-28

## 2021-06-28 VITALS
HEIGHT: 66 IN | HEART RATE: 62 BPM | SYSTOLIC BLOOD PRESSURE: 128 MMHG | BODY MASS INDEX: 46.12 KG/M2 | WEIGHT: 287 LBS | OXYGEN SATURATION: 98 % | DIASTOLIC BLOOD PRESSURE: 63 MMHG | TEMPERATURE: 98.4 F

## 2021-06-28 DIAGNOSIS — T50.905A DRUG SIDE EFFECTS, INITIAL ENCOUNTER: Primary | ICD-10-CM

## 2021-06-28 PROCEDURE — 99213 OFFICE O/P EST LOW 20 MIN: CPT | Performed by: INTERNAL MEDICINE

## 2021-06-28 RX ORDER — LIRAGLUTIDE 6 MG/ML
0.6 INJECTION SUBCUTANEOUS DAILY
Qty: 6 ML | Refills: 5
Start: 2021-06-28 | End: 2022-01-12

## 2021-06-28 NOTE — PROGRESS NOTES
Problem follow up:  Aden Tidwell returns for follow up visit regarding er v9sit. he was seen 2 days ago in er diagnosed with gastroernteritis and treated with anti nausea. Workup was significant for neg lab. Notes, labs, studies, imaging related to this problem during prior visit were available . Since that visit, he has improved. he has been compliant with prescribed treatment. Residual symptoms include: still queasy  New issues associated with this problem include: we upped victoza to 1.2 in may      Vitals:    21 1518   BP: 128/63   Pulse: 62   Temp: 98.4 °F (36.9 °C)   TempSrc: Temporal   SpO2: 98%   Weight: 287 lb (130.2 kg)   Height: 5' 6\" (1.676 m)   The abdomen is soft without tenderness, guarding, mass, rebound or organomegaly. Bowel sounds are normal. No CVA tenderness or inguinal adenopathy noted. 1. Drug side effects, initial encounter  Reduce victoza and switch over trulivcity in a month or 2  . Requested Prescriptions     Pending Prescriptions Disp Refills    liraglutide (Victoza 2-Denny) 0.6 mg/0.1 mL (18 mg/3 mL) pnij 6 mL 5     Si.6 mg by SubCUTAneous route daily.

## 2021-06-28 NOTE — PROGRESS NOTES
Ambulatory Care Management Note    Date/Time:  6/28/2021 1:01 PM    This patient was received as a referral from Daily assignment. Ambulatory Care Manager outreached to patient today to offer care management services. Introduction to self and role of care manager provided. Patient declined care management services at this time. No follow up call scheduled at this time. Patient has Ambulatory Care Manager's contact number for for any questions or concerns. Patient has follow up appointment with his PCP today for an Ed visit for abdominal pain. He will ask about blood sugars. Today's PQ=165.

## 2021-06-28 NOTE — PROGRESS NOTES
Identified pt with two pt identifiers(name and ). Reviewed record in preparation for visit and have obtained necessary documentation. Chief Complaint   Patient presents with    Epigastric Pain     pt had vomiting and diarrhea on 21, pt states it stared again on this past Saturday and he haile to [facility]. Vitals:    21 1518   BP: 128/63   Pulse: 62   Temp: 98.4 °F (36.9 °C)   TempSrc: Temporal   SpO2: 98%   Weight: 287 lb (130.2 kg)   Height: 5' 6\" (1.676 m)   PainSc:   0 - No pain       Health Maintenance Due   Topic    Foot Exam Q1     Shingrix Vaccine Age 50> (2 of 2)    MICROALBUMIN Q1        Coordination of Care Questionnaire:  :   1) Have you been to an emergency room, urgent care, or hospitalized since your last visit? If yes, where when, and reason for visit? Yes better med on 21 and [facility] ER on 21 for stomach issues. 2. Have seen or consulted any other health care provider since your last visit? If yes, where when, and reason for visit?   No

## 2021-07-19 RX ORDER — LOSARTAN POTASSIUM AND HYDROCHLOROTHIAZIDE 25; 100 MG/1; MG/1
TABLET ORAL
Qty: 30 TABLET | Refills: 5 | Status: SHIPPED | OUTPATIENT
Start: 2021-07-19 | End: 2022-01-20

## 2021-09-10 RX ORDER — METFORMIN HYDROCHLORIDE 500 MG/1
TABLET, EXTENDED RELEASE ORAL
Qty: 120 TABLET | Refills: 5 | Status: SHIPPED | OUTPATIENT
Start: 2021-09-10 | End: 2022-02-07

## 2021-10-04 ENCOUNTER — OFFICE VISIT (OUTPATIENT)
Dept: INTERNAL MEDICINE CLINIC | Age: 61
End: 2021-10-04
Payer: COMMERCIAL

## 2021-10-04 VITALS
RESPIRATION RATE: 14 BRPM | BODY MASS INDEX: 46.22 KG/M2 | HEIGHT: 66 IN | HEART RATE: 85 BPM | OXYGEN SATURATION: 95 % | WEIGHT: 287.6 LBS | TEMPERATURE: 98 F | DIASTOLIC BLOOD PRESSURE: 83 MMHG | SYSTOLIC BLOOD PRESSURE: 124 MMHG

## 2021-10-04 DIAGNOSIS — K64.1 GRADE II HEMORRHOIDS: Primary | ICD-10-CM

## 2021-10-04 DIAGNOSIS — Z23 ENCOUNTER FOR IMMUNIZATION: ICD-10-CM

## 2021-10-04 PROCEDURE — 90471 IMMUNIZATION ADMIN: CPT | Performed by: INTERNAL MEDICINE

## 2021-10-04 PROCEDURE — 90686 IIV4 VACC NO PRSV 0.5 ML IM: CPT | Performed by: INTERNAL MEDICINE

## 2021-10-04 PROCEDURE — 99213 OFFICE O/P EST LOW 20 MIN: CPT | Performed by: INTERNAL MEDICINE

## 2021-10-04 NOTE — PATIENT INSTRUCTIONS
High-Fiber Diet: Care Instructions  Overview     A high-fiber diet may help you relieve constipation and feel less bloated. Your doctor and dietitian will help you make a high-fiber eating plan based on your personal needs. The plan will include the things you like to eat. It will also make sure that you get 25 to 35 grams of fiber a day. Before you make changes to the way you eat, be sure to talk with your doctor or dietitian. Follow-up care is a key part of your treatment and safety. Be sure to make and go to all appointments, and call your doctor if you are having problems. It's also a good idea to know your test results and keep a list of the medicines you take. How can you care for yourself at home? · You can increase how much fiber you get if you eat more of certain foods. These foods include:  ? Whole-grain breads and cereals. ? Fruits, such as pears, apples, and peaches. Eat the skins and peels if you can.  ? Vegetables, such as broccoli, cabbage, spinach, carrots, asparagus, and squash. ? Starchy vegetables. These include potatoes with skins, kidney beans, and lima beans. · Take a fiber supplement every day if your doctor recommends it. Examples are Benefiber, Citrucel, FiberCon, and Metamucil. Ask your doctor how much to take. · Drink plenty of fluids. If you have kidney, heart, or liver disease and have to limit fluids, talk with your doctor before you increase the amount of fluids you drink. Where can you learn more? Go to http://www.gray.com/  Enter D919 in the search box to learn more about \"High-Fiber Diet: Care Instructions. \"  Current as of: December 17, 2020               Content Version: 13.0  © 1989-3564 Healthwise, [employer]. Care instructions adapted under license by [employer] (which disclaims liability or warranty for this information).  If you have questions about a medical condition or this instruction, always ask your healthcare professional. [address] any warranty or liability for your use of this information. Hemorrhoids: Care Instructions  Overview     Hemorrhoids are swollen veins that develop in the anal canal. Bleeding during bowel movements, itching, and rectal pain are the most common symptoms. Hemorrhoids can be uncomfortable at times, but rarely are they a serious problem. Most of the time, you can treat them with simple changes to your diet and bowel habits. These changes include eating more fiber and not straining to pass stools. Most hemorrhoids don't need surgery or other treatment unless they are very large and painful or bleed a lot. Follow-up care is a key part of your treatment and safety. Be sure to make and go to all appointments, and call your doctor if you are having problems. It's also a good idea to know your test results and keep a list of the medicines you take. How can you care for yourself at home? · Sit in a few inches of warm water (sitz bath) 3 times a day and after bowel movements. The warm water helps with pain and itching. · Put ice on your anal area several times a day for 10 minutes at a time. Put a thin cloth between the ice and your skin. Follow this by placing a warm, wet towel on the area for another 10 to 20 minutes. · Take pain medicines exactly as directed. ? If the doctor gave you a prescription medicine for pain, take it as prescribed. ? If you are not taking a prescription pain medicine, ask your doctor if you can take an over-the-counter medicine. · Keep the anal area clean, but be gentle. Use water and a fragrance-free soap, or use baby wipes or medicated pads such as Tucks. · Wear cotton underwear and loose clothing to decrease moisture in the anal area. · Eat more fiber. Include foods such as whole-grain breads and cereals, raw vegetables, raw and dried fruits, and beans. · Drink plenty of fluids.  If you have kidney, heart, or liver disease and have to limit fluids, talk with your doctor before you increase the amount of fluids you drink. · Use a stool softener that contains bran or psyllium. You can save money by buying bran or psyllium (available in bulk at most health food stores) and sprinkling it on foods or stirring it into fruit juice. Or you can use a product such as Metamucil or Hydrocil. · Practice healthy bowel habits. ? Go to the bathroom as soon as you have the urge. ? Avoid straining to pass stools. Relax and give yourself time to let things happen naturally. ? Do not hold your breath while passing stools. ? Do not read while sitting on the toilet. Get off the toilet as soon as you have finished. · Take your medicines exactly as prescribed. Call your doctor if you think you are having a problem with your medicine. When should you call for help? Call 911 anytime you think you may need emergency care. For example, call if:    · You pass maroon or very bloody stools. Call your doctor now or seek immediate medical care if:    · You have increased pain.     · You have increased bleeding. Watch closely for changes in your health, and be sure to contact your doctor if:    · Your symptoms have not improved after 3 or 4 days. Where can you learn more? Go to http://www.milton.com/  Enter F228 in the search box to learn more about \"Hemorrhoids: Care Instructions. \"  Current as of: February 10, 2021               Content Version: 13.0  © 1846-7335 Healthwise[employer]. Care instructions adapted under license by [employer] (which disclaims liability or warranty for this information). If you have questions about a medical condition or this instruction, always ask your healthcare professional. [place] [address] any warranty or liability for your use of this information.

## 2021-10-04 NOTE — PROGRESS NOTES
Acacia Vargas is a 64 y.o. male  Chief Complaint   Patient presents with    Hemorrhoids     pt c/o of rectal bleeding for couple of days then stopped then saturday patient felt a \"bump\" in that area        Visit Vitals  /83 (BP 1 Location: Left arm, BP Patient Position: Sitting, BP Cuff Size: Large adult)   Pulse 85   Temp 98 °F (36.7 °C) (Temporal)   Resp 14   Ht 5' 6\" (1.676 m)   Wt 287 lb 9.6 oz (130.5 kg)   SpO2 95%   BMI 46.42 kg/m²      Health Maintenance Due   Topic Date Due    Foot Exam Q1  11/29/2019    Shingrix Vaccine Age 50> (2 of 2) 04/11/2021    MICROALBUMIN Q1  05/08/2021    A1C test (Diabetic or Prediabetic)  08/03/2021       HPI  Mr. Gissel Brown presents with rectal bleeding and a swelling around annal sphincter. 2 days before presentation, he has bleeding that stopped but he noted a swelling around his annal sphincter which prompted him to come to clinic, it is not painful or itching. Otherwise in his usual state of health. No Known Allergies       ROS  Review of Systems   Constitutional: Negative for chills and fever. Respiratory: Negative for hemoptysis. Cardiovascular: Negative for chest pain, palpitations and leg swelling. Gastrointestinal: Negative for abdominal pain, nausea and vomiting. Genitourinary: Negative for dysuria. Musculoskeletal: Negative for myalgias. Skin: Negative for rash. Neurological: Negative for dizziness, tingling, tremors and headaches. Psychiatric/Behavioral: Negative for suicidal ideas. The patient does not have insomnia. EXAM  Physical Exam  Constitutional:       General: He is not in acute distress. Appearance: Normal appearance. He is not toxic-appearing. HENT:      Head: Normocephalic and atraumatic. Nose: No congestion or rhinorrhea. Eyes:      General:         Right eye: No discharge. Extraocular Movements: Extraocular movements intact.    Cardiovascular:      Rate and Rhythm: Normal rate and regular rhythm. Heart sounds: Normal heart sounds. No murmur heard. No gallop. Pulmonary:      Effort: Pulmonary effort is normal. No respiratory distress. Breath sounds: Normal breath sounds. No wheezing or rales. Abdominal:      General: There is no distension. Palpations: Abdomen is soft. There is no mass. Tenderness: There is no abdominal tenderness. There is no right CVA tenderness, left CVA tenderness, guarding or rebound. Hernia: No hernia is present. Comments: JONH small external hemorrhoid that is visible, palpable hemorrhoids inside the sphincter   Musculoskeletal:         General: No swelling or deformity. Right lower leg: No edema. Skin:     Coloration: Skin is not jaundiced. Findings: No bruising or lesion. Neurological:      General: No focal deficit present. Mental Status: He is alert and oriented to person, place, and time. Motor: No weakness. Psychiatric:         Mood and Affect: Mood normal.         ASSESSMENT/PLAN  Encounter Diagnoses     ICD-10-CM ICD-9-CM   1. Grade II hemorrhoids  K64.1 455.0   2.  Encounter for immunization  Z23 V03.89     Orders Placed This Encounter    INFLUENZA VIRUS VAC QUAD,SPLIT,PRESV FREE SYRINGE IM (Flulaval, Fluzone, Fluarix) (16125)   -conservative managements for now   -will consider surgical referral if no improvement   -fiber diet     India Gasca MD

## 2021-10-04 NOTE — PROGRESS NOTES
Reviewed record in preparation for visit and have obtained necessary documentation. Identified pt with two pt identifiers(name and ). Chief Complaint   Patient presents with    Hemorrhoids     pt c/o of rectal bleeding for couple of days then stopped then saturday patient felt a \"bump\" in that area      Blood pressure 124/83, pulse 85, temperature 98 °F (36.7 °C), temperature source Temporal, resp. rate 14, height 5' 6\" (1.676 m), weight 287 lb 9.6 oz (130.5 kg), SpO2 95 %. Health Maintenance Due   Topic Date Due    Diabetic Foot Care  2019    Shingles Vaccine (2 of 2) 2021    Albumin Urine Test  2021    Hemoglobin A1C    2021    Yearly Flu Vaccine (1) 2021       Mr. Janette Hassan has a reminder for a \"due or due soon\" health maintenance. I have asked that he discuss this further with his primary care provider for follow-up on this health maintenance. Coordination of Care Questionnaire:  :     1) Have you been to an emergency room, urgent care clinic since your last visit? no   Hospitalized since your last visit? no             2) Have you seen or consulted any other health care providers outside of [address] [place] [address] since your last visit? no  (Include any pap smears or colon screenings in this section.)    3) In the event something were to happen to you and you were unable to speak on your behalf, do you have an Advance Directive/ Living Will in place stating your wishes?  NO

## 2021-10-21 ENCOUNTER — OFFICE VISIT (OUTPATIENT)
Dept: INTERNAL MEDICINE CLINIC | Age: 61
End: 2021-10-21
Payer: COMMERCIAL

## 2021-10-21 VITALS
HEART RATE: 59 BPM | WEIGHT: 288.4 LBS | HEIGHT: 66 IN | OXYGEN SATURATION: 97 % | BODY MASS INDEX: 46.35 KG/M2 | DIASTOLIC BLOOD PRESSURE: 72 MMHG | TEMPERATURE: 98.4 F | SYSTOLIC BLOOD PRESSURE: 129 MMHG | RESPIRATION RATE: 16 BRPM

## 2021-10-21 DIAGNOSIS — Z79.4 TYPE 2 DIABETES MELLITUS WITHOUT COMPLICATION, WITH LONG-TERM CURRENT USE OF INSULIN (HCC): Primary | ICD-10-CM

## 2021-10-21 DIAGNOSIS — Z79.4 TYPE 2 DIABETES MELLITUS WITH HYPERGLYCEMIA, WITH LONG-TERM CURRENT USE OF INSULIN (HCC): ICD-10-CM

## 2021-10-21 DIAGNOSIS — E11.9 TYPE 2 DIABETES MELLITUS WITHOUT COMPLICATION, WITH LONG-TERM CURRENT USE OF INSULIN (HCC): Primary | ICD-10-CM

## 2021-10-21 DIAGNOSIS — E66.01 MORBID OBESITY WITH BMI OF 45.0-49.9, ADULT (HCC): ICD-10-CM

## 2021-10-21 DIAGNOSIS — E11.65 TYPE 2 DIABETES MELLITUS WITH HYPERGLYCEMIA, WITH LONG-TERM CURRENT USE OF INSULIN (HCC): ICD-10-CM

## 2021-10-21 LAB — HBA1C MFR BLD HPLC: 9 %

## 2021-10-21 PROCEDURE — 83036 HEMOGLOBIN GLYCOSYLATED A1C: CPT | Performed by: INTERNAL MEDICINE

## 2021-10-21 PROCEDURE — 99213 OFFICE O/P EST LOW 20 MIN: CPT | Performed by: INTERNAL MEDICINE

## 2021-10-21 RX ORDER — INSULIN GLARGINE 300 U/ML
35 INJECTION, SOLUTION SUBCUTANEOUS DAILY
Qty: 7.5 ML | Refills: 3 | Status: SHIPPED | OUTPATIENT
Start: 2021-10-21 | End: 2022-06-07

## 2021-10-21 NOTE — PROGRESS NOTES
1. Have you been to the ER, urgent care clinic since your last visit? Hospitalized since your last visit? No    2. Have you seen or consulted any other health care providers outside of the [address] [place] [address] since your last visit? Include any pap smears or colon screening.  No   Chief Complaint   Patient presents with    Diabetes     follow up    Hemorrhoids

## 2021-10-22 NOTE — PROGRESS NOTES
Diabetic ROS - medication compliance: compliant all of the time, diabetic diet compliance: compliant most of the time, home glucose monitoring: is performed sporadically, fasting values range 120 up to 180 last week, nonfasting values range 160, further diabetic ROS: no polyuria or polydipsia, no chest pain, dyspnea or TIA's, no numbness, tingling or pain in extremities, no unusual visual symptoms, no hypoglycemia, no medication side effects noted. New concerns: wants dietary help. Diabetic exam: heart sounds normal rate, regular rhythm, normal S1, S2, no murmurs, rubs, clicks or gallops, chest clear. Lab review: labs reviewed, I note that glycosylated hemoglobin abnormal   Results for orders placed or performed in visit on 10/21/21   AMB POC HEMOGLOBIN A1C   Result Value Ref Range    Hemoglobin A1c (POC) 9.0 %     Lab Results   Component Value Date/Time    Hemoglobin A1c 9.0 (H) 05/03/2021 09:17 AM    Hemoglobin A1c (POC) 9.0 10/21/2021 03:53 PM     .   Assessment: Diabetes Mellitus: poorly controlled. Plan: See orders for this visit as documented in the electronic medical record. Diabetic issues reviewed with him: referral to Diabetic Education department and diabetic diet discussed in detail, written exchange diet given. Increase insulin      Diagnoses and all orders for this visit:    1. Type 2 diabetes mellitus without complication, with long-term current use of insulin (HCC)  -     AMB POC HEMOGLOBIN A1C    2. Type 2 diabetes mellitus with hyperglycemia, with long-term current use of insulin (HCC)  -     REFERRAL TO DIABETIC EDUCATION    3. Morbid obesity with BMI of 45.0-49.9, adult ([address]    Other orders  -     insulin glargine U-300 conc (Toujeo SoloStar U-300 Insulin) 300 unit/mL (1.5 mL) inpn pen; 35 Units by SubCUTAneous route daily.

## 2021-11-03 ENCOUNTER — OFFICE VISIT (OUTPATIENT)
Dept: INTERNAL MEDICINE CLINIC | Age: 61
End: 2021-11-03
Payer: COMMERCIAL

## 2021-11-03 VITALS
HEART RATE: 57 BPM | DIASTOLIC BLOOD PRESSURE: 64 MMHG | HEIGHT: 66 IN | TEMPERATURE: 97.7 F | BODY MASS INDEX: 45.32 KG/M2 | SYSTOLIC BLOOD PRESSURE: 123 MMHG | WEIGHT: 282 LBS | RESPIRATION RATE: 18 BRPM | OXYGEN SATURATION: 99 %

## 2021-11-03 DIAGNOSIS — E11.65 TYPE 2 DIABETES MELLITUS WITH HYPERGLYCEMIA, WITH LONG-TERM CURRENT USE OF INSULIN (HCC): ICD-10-CM

## 2021-11-03 DIAGNOSIS — I10 ESSENTIAL HYPERTENSION: ICD-10-CM

## 2021-11-03 DIAGNOSIS — Z79.4 TYPE 2 DIABETES MELLITUS WITHOUT COMPLICATION, WITH LONG-TERM CURRENT USE OF INSULIN (HCC): Primary | ICD-10-CM

## 2021-11-03 DIAGNOSIS — Z79.4 TYPE 2 DIABETES MELLITUS WITH HYPERGLYCEMIA, WITH LONG-TERM CURRENT USE OF INSULIN (HCC): ICD-10-CM

## 2021-11-03 DIAGNOSIS — E78.5 DYSLIPIDEMIA, GOAL LDL BELOW 100: ICD-10-CM

## 2021-11-03 DIAGNOSIS — E11.9 TYPE 2 DIABETES MELLITUS WITHOUT COMPLICATION, WITH LONG-TERM CURRENT USE OF INSULIN (HCC): Primary | ICD-10-CM

## 2021-11-03 PROCEDURE — 99213 OFFICE O/P EST LOW 20 MIN: CPT | Performed by: INTERNAL MEDICINE

## 2021-11-03 NOTE — PROGRESS NOTES
Chief Complaint   Patient presents with    Hypertension     1. Have you been to the ER, urgent care clinic since your last visit? Hospitalized since your last visit? No    2. Have you seen or consulted any other health care providers outside of the [address] [place] [address] since your last visit? Include any pap smears or colon screening.  No    Visit Vitals  /64   Pulse (!) 57   Temp 97.7 °F (36.5 °C) (Temporal)   Resp 18   Ht 5' 6\" (1.676 m)   Wt 282 lb (127.9 kg)   SpO2 99%   BMI 45.52 kg/m²

## 2021-11-04 NOTE — PROGRESS NOTES
Diabetic ROS - medication compliance: compliant all of the time, diabetic diet compliance: compliant most of the time, home glucose monitoring: is performed sporadically. New concerns: feet eval.    Diabetic exam: heart sounds normal rate, regular rhythm, normal S1, S2, no murmurs, rubs, clicks or gallops, chest clear, Diabetic foot exam:     Left Foot:   Visual Exam: normal    Pulse DP: 2+ (normal)   Filament test: normal sensation    Vibratory sensation: normal      Right Foot:   Visual Exam: normal    Pulse DP: 2+ (normal)   Filament test: normal sensation    Vibratory sensation: normal    .   Lab review: labs reviewed, I note that glycosylated hemoglobin, abnormal   Lab Results   Component Value Date/Time    Hemoglobin A1c 9.0 (H) 05/03/2021 09:17 AM    Hemoglobin A1c (POC) 9.0 10/21/2021 03:53 PM     .   Assessment: Diabetes Mellitus: borderline controlled. Plan: See orders for this visit as documented in the electronic medical record. Diabetic issues reviewed with him: referral to Diabetic Education department and diabetic diet discussed in detail, written exchange diet given. Labs before next visit    Diagnoses and all orders for this visit:    1. Type 2 diabetes mellitus without complication, with long-term current use of insulin (HCC)  -     MICROALBUMIN, UR, RAND W/ MICROALB/CREAT RATIO; Future  -     CBC WITH AUTOMATED DIFF; Future  -     LIPID PANEL; Future  -     METABOLIC PANEL, COMPREHENSIVE; Future  -     HEMOGLOBIN A1C WITH EAG; Future  -      DIABETES FOOT EXAM    2. Dyslipidemia, goal LDL below 100    3. Essential hypertension    4.  Type 2 diabetes mellitus with hyperglycemia, with long-term current use of insulin (HCC)

## 2021-11-08 ENCOUNTER — CLINICAL SUPPORT (OUTPATIENT)
Dept: DIABETES SERVICES | Age: 61
End: 2021-11-08
Payer: COMMERCIAL

## 2021-11-08 DIAGNOSIS — E11.65 UNCONTROLLED TYPE 2 DIABETES MELLITUS WITH HYPERGLYCEMIA (HCC): Primary | ICD-10-CM

## 2021-11-08 PROCEDURE — G0108 DIAB MANAGE TRN  PER INDIV: HCPCS

## 2021-11-08 NOTE — PROGRESS NOTES
[address] [place] [address] for Diabetes Health  Diabetes Self-Management Education & Support Program  Pre-program Assessment    Reason for Referral: Diabetes Education  Referral Source: Zofia Bay MD  Services requested: DSMES    ASSESSMENT    From my perspective, the participant would benefit from [facility] specifically related to Reducing risks, Healthy eating, Monitoring, Physical activity, Taking medications, Healthy coping and Problem solving. Will adapt DSMES program to build on participant's skills score, confidence score and preparedness score as noted in the Diabetes Skills, Confidence, and Preparedness Index. During the program, we will focus on providing DSMES that specifically addresses participant's interest in Healthy eating, Physical activity, Healthy coping and Problem solving, as shown by their reported readiness to change. The participant would be best served by attending weekly group class series. Diabetes Self-Management Education Follow-up Visit: January 4, 2021       Clinical Presentation  Forrest Abbasi is a 64 y.o.  male referred for diabetes self-management education. Participant has Type 2 DM on insulin for 21-30 years. Family history positive for diabetes. Patient reports receiving DSMES services in the past. Most recent A1c value:   Lab Results   Component Value Date/Time    Hemoglobin A1c 9.0 (H) 05/03/2021 09:17 AM    Hemoglobin A1c (POC) 9.0 10/21/2021 03:53 PM       Diabetes-related medications:  Current dosing:   Key Antihyperglycemic Medications             insulin glargine U-300 conc (Toujeo SoloStar U-300 Insulin) 300 unit/mL (1.5 mL) inpn pen 35 Units by SubCUTAneous route daily. metFORMIN ER (GLUCOPHAGE XR) 500 mg tablet TAKE 4 TABLETS BY MOUTH EVERY DAY AS DIRECTED    liraglutide (Victoza 2-Denny) 0.6 mg/0.1 mL (18 mg/3 mL) pnij 0.6 mg by SubCUTAneous route daily.     Jardiance 25 mg tablet TAKE 1 TABLET BY MOUTH DAILY          Blood Pressure Management  Key ACE/ARB Medications             losartan-hydroCHLOROthiazide (HYZAAR) 100-25 mg per tablet TAKE 1 TABLET BY MOUTH EVERY DAY          Lipid Management  Key Antihyperlipidemia Meds             lovastatin (MEVACOR) 40 mg tablet TAKE 1 TABLET BY MOUTH EVERY EVENING          Clot Prevention  Key Anti-Platelet Anticoagulant Meds             aspirin delayed-release (Adult Low Dose Aspirin) 81 mg tablet Take 81 mg by mouth daily. Learning Assessment  Learning objectives Educator assessment (11/8/2021)   Diabetes Disease Process  The participant can   A) describe diabetes in basic terms;   B) state the type of diabetes they have; &   C) state accepted blood glucose targets. Healthy Eating  The participant can   A) identify carbohydrate foods; &   B) accurately read food labels. Being Active  The participant can  A) state the benefits of physical activity;  B) report their current PA practices;  C) identify PA they would consider incorporating in their lives; &  D) develop an implementation plan. Monitoring  The participant can  A) operate their blood glucose meter; &  B) describe how they log their blood glucoses to share with their provider. Taking Medications  The participant can  A) name their diabetes medications;  B) state the purpose and dose;  C) note side effects; &  D) describe proper storage, disposal & transport (if appropriate). Healthy Coping  The participant can    A) describe their response to diabetes diagnosis; B) describe their specific coping mechanisms;  C) identify supportive people and/or other resources that positively support their diabetes self-care and health. Reducing Risks  The participant can describe the preventive measures used by providers to promote health and prevent diabetes complications.      Problem Solving  The participant can   A) identify signs, symptoms & treatment of hypoglycemia;   B) identify signs, symptoms & treatment of hyperglycemia;  C) describe their sick day plan; &  D) identify BG patterns to discuss with their provider. Yes  Yes  No        Yes - \"some\"  No        Yes  Yes  Yes  Yes        Yes  No          Yes  Yes  Yes  Yes      No  Yes  Yes        Yes          Yes  No  No  Yes     Characteristics to Learning   Barriers to Learning   [] Cognitive loss  [] Mental retardation   [] Intellectual delay/cognitive impairment  [] Psychiatric disorder  [] Visually impaired  [] Hearing loss                 [] Low literacy (difficulty with written text)  [] Low numeracy (difficulty with mathematical information  [] Low health literacy (difficulty with understanding health information & services  [] Language  [] Functional limitation  [] Pain   [] Financial  [] Transportation  [x] None   Favorite Ways to Learn   [] Lecture  [] Slides  [] Reading [] Video-Internet  [] Cassettes/CDs/MP3's  [x] Interactive Small Groups [] Other       Behavioral Assessment  Current self-care practices  Educator assessment (11/8/2021)   Healthy Eating  Current practices  24-hour Dietary Recall:  Breakfast: Scrambled eggs, 2 slices toast, sugar-free jelly, water, apple juice  Lunch: salad grilled chicken, baked pot, sweet tea  Dinner: baked ham, baked chicken, cheese, 1 roll  Snacks: sugar free pudding  Beverages: water, sweet tea, apple juice  Alcohol: None     Would benefit from DSMES related to Healthy Eating: Yes      Eats a carbohydrate controlled diet: No      Stage of change: Preparation   Being Active  Current practices  How many days during the past week have you performed physical activity where your heart beats faster and your breathing is harder than normal for 30 minutes or more?   3 days    How many days in a typical week do you perform activity such as this?  3 days     Would benefit from [facility] related to Being Active: Yes      Exercises 150 minutes/week: No      Stage of change: Preparation     Monitoring  Current practices  Do you monitor your blood sugar? Yes    How often do you monitor? 1x/day    What are the range of readings? 107-128 mg/dL    Do you know your last A1c measurement? No    Do you know the meaning of the A1c? No     Would benefit from [facility] related to Monitoring: Yes      Uses BG readings to establish trends and understand BG patterns: No      Stage of change: Preparation   Taking Medication  Current practices  Do you understand what your diabetes medications do? Yes    How often do you miss doses of your diabetes medications? Never    Can you afford your diabetes medications? Yes   Would benefit from [facility] related to Taking Medication: Yes      Takes medications consistently to receive full benefit: Yes      Stage of change: Action       Healthy Coping   Current state  Diabetes Skills, Confidence and Preparedness Index: Total score: 5.6  Skills: 5.6  Confidence: 5.6  Preparedness: 5.6   Would benefit from DSMES related to Healthy Coping: Yes      Identifies specific people, organizations,etc, that actively support their diabetes self-care efforts: Yes      Stage of change: Action     Reducing Risks  Current state  Vaccines:  Influenza:   Immunization History   Administered Date(s) Administered    Influenza Vaccine 10/23/2019    Influenza Vaccine Service at Home) PF (>6 Mo Flulaval, Fluarix, and >3 Yrs Afluria, Fluzone 92284) 10/19/2020, 10/04/2021       Pneumococcal:   Immunization History   Administered Date(s) Administered    (RETIRED) Pneumococcal Vaccine (Unspecified Type) 08/02/2008    Pneumococcal Polysaccharide (PPSV-23) 12/01/2020        Hepatitis: There is no immunization history for the selected administration types on file for this patient.     Examinations:  Diabetic Foot and Eye Exam HM Status   Topic Date Due    Diabetic Foot Care  11/03/2022    Eye Exam  04/02/2023        Dental exam: Up to date    Foot exam: Up to date    Heart Protection:  BP Readings from Last 2 Encounters:   11/03/21 123/64   10/21/21 129/72        Lab Results   Component Value Date/Time    LDL, calculated 85.6 05/03/2021 09:17 AM        Kidney Protection:  Lab Results   Component Value Date/Time    Microalbumin/Creat ratio (mg/g creat) 6 05/08/2020 07:56 AM    Microalbumin,urine random 0.61 05/08/2020 07:56 AM        Would benefit from [facility] related to Reducing Risks: Yes      Actively participates in decision-making with provider regarding secondary prevention:  Yes      Stage of change: Action   Problem Solving  Current state  Hypoglycemia Management:  What are signs and symptoms of hypoglycemia that you experience? Shaking/trembling    How do you prevent hypoglycemia? Take medications as instructed    How do you treat hypoglycemia? Pt reported being unaware of how to treat hypoglycemia    Hyperglycemia Management:  What are signs and symptoms of hyperglycemia that you experience? Extreme thirst    How can you prevent hyperglycemia? Take medication as instructed    Sick Day Management:  What do you do differently on sick days? Pt reported being unaware of self-management on sick days    Pattern Management:  Do you notice blood glucose patterns when you look at the readings in your meter or logbook? No    How do you use the blood glucose readings from your meter or logbook? Pt reported being unaware of pattern management skills     Would benefit from [facility] related to Problem Solving: Yes      Articulates appropriate strategies to address hypoglycemia, hyperglycemia, sick day care and BG pattern: No      Stage of change: Preparation     Note: Content derived from the American Association of Diabetes Educators' Diabetes Education Curriculum: A Guide to Successful Self-Management (3rd edition)      Jefry Leblanc RN on 11/8/2021 at 9:45 AM    I have personally reviewed the health record, including provider notes, laboratory data and current medications before making these care and education recommendations.  The time spent in this effort is included in the total time.  Total minutes: 30    Diabetes Skills, Confidence & Preparedness Index (SCPI) ©  Thank you for completing the Skills, Confidence & Preparedness Index! Below are your scores. All scales and questions are out of 7. If you would like these results emailed, please enter your email address along with some identifying patient information. Email:    Patient Identifier:        Overall SCPI score: 5.6 Skills Score: 5.6  Low: Healthy Eating(Q1),Blood Sugar Monitoring(Q4),Reducing Risks(Q5),Blood Sugar Monitoring(Q8) Confidence Score: 5.6  Low: Healthy Eating(Q1),Reducing Risks(Q3),Healthy LVUOWT(L5) Preparedness Score: 5.6  Low: Taking Medication(Q5)  Healthy Eating Score: 5.5  Low: Skills(Q1),Confidence(Q1),Confidence(Q4) Taking Medication Score: 4.0  Low: Preparedness(Q5) Blood Sugar Monitoring Score: 5.4  Low: Skills(Q4),Skills(Q8),Preparedness(Q6) Reducing Risks Score: 5.5  Low: Skills(Q5),Confidence(Q3)  Problem Solving Score: 6.0  Low: Skills(Q6),Confidence(Q7),Preparedness(Q7) Healthy Coping Score: 6.0  Low: Skills(Q7),Confidence(Q2),Preparedness(Q3) Being Active Score: 6.5  Low: Confidence(Q5)    Skills/Knowledge Questions  1. I know how to plan meals that have the best balance between carbohydrates, proteins and vegetables. 5  2. I know how my diabetes medications (pills, injectables and/or insulin) work in my body. 6  3. I know when to check my blood sugar if I want to see how my body responded to a meal. 6  4. I know when to check my blood sugars to determine if my medication or insulin doses are correct. 5  5. I know what to do to prevent a low blood sugar when I exercise (either before, during, or after). 5  6. When I am sick, I know what to do differently with my diabetes management. 6  7. I know how stress can affect my diabetes management. 6  8. When I look at my blood sugars over a given week, I can explain what my blood sugar pattern is. 5  9.  I know what my target levels are for A1c, blood pressure and cholesterol. 6  Confidence Questions  1. I am confident that I can plan balanced meals and snacks. 5  2. I am confident that I can manage my stress. 6  3. I am confident that I can prevent a low blood sugar during or after exercise. 5  4. I am confident that the next time I eat out, I will be able to choose foods that best keep my blood sugars in target. 5  5. I am confident I can include exercise into my schedule. 6  6. I am confident that I can use my daily blood sugars to adjust my diet, my activity, and/or my insulin. 6  7. When something out of my normal routine happens, I am confident that I can problem-solve and keep my diabetes on track. 6  Preparedness Questions  1. Within the next month, I will begin to eat more balanced meals and snacks. 7  2. Within the next month, I will choose an exercise activity and I will start fitting it into my schedule. 8  3. Within the next month, I will make a list of stress management options that work for me. 6  4. Within the next month, I will consistently plan ahead to prevent low blood sugars. 6  5. Within the next month, I will start adjusting my insulin doses on my own. 2  6. Within the next month, I will begin making changes to my diabetes management based on my daily blood sugars (eg - eating, activity and/or insulin). 5  7. Within the next month, I will begin making changes to my diabetes management to meet my overall goals (eg - eating, activity and/or insulin).  6

## 2021-12-08 RX ORDER — EMPAGLIFLOZIN 25 MG/1
TABLET, FILM COATED ORAL
Qty: 30 TABLET | Refills: 5 | Status: SHIPPED | OUTPATIENT
Start: 2021-12-08 | End: 2022-07-08

## 2022-01-04 ENCOUNTER — CLINICAL SUPPORT (OUTPATIENT)
Dept: DIABETES SERVICES | Age: 62
End: 2022-01-04
Payer: COMMERCIAL

## 2022-01-04 DIAGNOSIS — E11.65 UNCONTROLLED TYPE 2 DIABETES MELLITUS WITH HYPERGLYCEMIA (HCC): Primary | ICD-10-CM

## 2022-01-04 PROCEDURE — G0109 DIAB MANAGE TRN IND/GROUP: HCPCS | Performed by: DIETITIAN, REGISTERED

## 2022-01-05 NOTE — PROGRESS NOTES
[facility] for Diabetes Health  Diabetes Self-Management Education & Support Program  Encounter note    SUMMARY  Diabetes self-care management training was completed related to Reducing risks. The participant will return on January 18 to continue DSMES related to Physical activity and Taking medications. The participant did identify SMART Goal(s): see below. He will practice knowledge and skills related to reducing risks to improve diabetes self-management. EVALUATION:  Mr. Dru Herr reports living with T2DM for 36 years. He hopes to refresh his knowledge for managing his glucose levels and learn up-to-date information on diabetes treatment methods. He has established goals for A1C and BP. He sees a podiatrist every 3 months, a dentist and ophthalmologist  2x per year. He expressed an interest in losing weight by monitoring his portion sizes. RECOMMENDATIONS:  Complete Personal Care Record   Eat 3 meals per day; Aim for ~45 g CHO per meal       Next provider visit is scheduled for 1/18/2022 for 3rd DSMES class. Mr. Dru Herr will make up class 2 on 2/8/2022. He will see his PCP on 2/14/2022. SMART GOAL(S)  1. Practice portion control for all three meals daily over the next two weeks. ACHIEVEMENT OF GOAL(S)  [] 0-24%     [] 25-49%     [] 50-74%     [] %       DATE DSMES TOPIC EVALUATION     1/5/2022 WHAT IS DIABETES?   a. Role of the normal pancreas in energy balance and blood glucose control   b. The defect seen in diabetes   c. Signs & symptoms of diabetes   d. Diagnosis of diabetes   e. Types of diabetes   f. Blood glucose targets in non-pregnant & non-pregnant adults       The participant knows   Their type of diabetes Yes   The basic physiologic defect Yes   Blood glucose targets Yes     DATE DSMES TOPIC EVALUATION     1/5/2022 HOW DO I STAY HEALTHY?   a. Prevention    Vaccinations    Preconception care (if applicable)  b.  Examinations    Eye     Dental   Foot   c. Diabetic complications' prevention   [facility]      The participant has a personal diabetes care record to keep abreast of diabetes health Yes    The participant would benefit from completing the personal diabetes care record. Carey Noonan RD on 1/5/2022 at 8:13 AM    I have personally reviewed the health record, including provider notes, laboratory data and current medications before making these care and education recommendations. The time spent in this effort is included in the total time.   Total minutes: 120

## 2022-01-12 ENCOUNTER — PATIENT MESSAGE (OUTPATIENT)
Dept: INTERNAL MEDICINE CLINIC | Age: 62
End: 2022-01-12

## 2022-01-12 RX ORDER — LIRAGLUTIDE 6 MG/ML
INJECTION SUBCUTANEOUS
Qty: 6 ML | Refills: 5 | Status: SHIPPED | OUTPATIENT
Start: 2022-01-12

## 2022-01-12 RX ORDER — PEN NEEDLE, DIABETIC 31 GX3/16"
NEEDLE, DISPOSABLE MISCELLANEOUS
Qty: 100 PEN NEEDLE | Refills: 5 | Status: SHIPPED | OUTPATIENT
Start: 2022-01-12

## 2022-01-12 NOTE — TELEPHONE ENCOUNTER
----- Message from [address] [place] [address]. Yemi Tan sent at 1/12/2022  8:28 AM EST -----  Regarding: DARIEN Joe  I only have 3-needles left. Pharmacy stated I only had approval for 1-per day, but I take two shots, so seems I am using too many.   Can this be updated with [address]

## 2022-01-12 NOTE — TELEPHONE ENCOUNTER
----- Message from [address] [place] [address]. Valentino Sings sent at 1/12/2022  8:28 AM EST -----  Regarding: BD Ultra Dina Hamper  Dr. Audi Garza  I only have 3-needles left. Pharmacy stated I only had approval for 1-per day, but I take two shots, so seems I am using too many.   Can this be updated with [address]

## 2022-01-12 NOTE — TELEPHONE ENCOUNTER
Spoke with patient request refill for BD Ultra-Fine Mini Pen Needle 31 gauge x 3/16\" ndle.  Due to patient has 2 injection daily 1 for victoza and the other for toujeo

## 2022-01-18 ENCOUNTER — CLINICAL SUPPORT (OUTPATIENT)
Dept: DIABETES SERVICES | Age: 62
End: 2022-01-18
Payer: COMMERCIAL

## 2022-01-18 DIAGNOSIS — E11.65 UNCONTROLLED TYPE 2 DIABETES MELLITUS WITH HYPERGLYCEMIA (HCC): Primary | ICD-10-CM

## 2022-01-18 PROCEDURE — G0109 DIAB MANAGE TRN IND/GROUP: HCPCS

## 2022-01-18 NOTE — PROGRESS NOTES
[facility] for Diabetes Health  Diabetes Self-Management Education & Support Program  Encounter note    SUMMARY  Diabetes self-care management training was completed related to Physical activity and Taking medications. The participant will return on January 25 to continue DSMES related to Healthy coping and Problem solving. The participant did identify SMART Goal(s): - Increase physical activity by using his stationary bike for 30 minutes 3 times over the next week. , and will practice knowledge and skills related to healthy eating and monitoring and being active and medications to improve diabetes self-management. EVALUATION:  Participant states that he is using his stationary bike for exercise each week. He says that he had to pay out of pocket for his insulin needles because of some insurance issues. RECOMMENDATIONS:  Encouraged him to check his blood sugar daily, log his reading and bring the log to providers visits. Continue to engage in physical activity each week. Next provider visit is scheduled for 2/14/22 with Dr. Erick Oliveira       SMART GOAL(S)  1. Practice portion control over the next 2 weeks. ACHIEVEMENT OF GOAL(S)  [] 0-24%     [] 25-49%     [] 50-74%     [x] %     DATE DSMES TOPIC EVALUATION     1/18/2022 HOW DO MY DIABETES MEDICATIONS WORK?   a. Type 1 medications & methods    Insulin injections    Injection sites   b. Type 2 medications    Oral agents    GLP-1 agonists   c. Hypoglycemia symptoms & treatment    Glucagon emergency kits   d. General guidance regarding insulin use whether Type 1, 2 or gestational diabetes    Storage of insulin    Disposal     Traveling with medications   e. Barriers to medication adherence      The participant    Can describe the expected action & side effects of prescribed diabetes medications Yes.  Can demonstrate injection technique (if applicable) Yes.     The participant needs to address getting covered by insurance his insulin needles. DATE DSMES TOPIC EVALUATION     1/18/2022 HOW DOES PHYSICAL ACTIVITY AFFECT MY DIABETES?   a. Benefits of physical activity   b. Beginning a program of physical activity    Walking    Pedometers    Goal setting   c. Structured physical activity program    Aerobic activity    Resistance    Flexibility    Balance   d. Physical activity program progression   e. Safety issues   f. Barriers to physical activity   g. Facilitators of physical activity        The participant has established a regular physical activity plan Yes    The participant would benefit from continuing to complete some physical activities each week. Terry Musa RN on 1/18/2022 at 4:39 PM    I have personally reviewed the health record, including provider notes, laboratory data and current medications before making these care and education recommendations. The time spent in this effort is included in the total time.   Total minutes: 120

## 2022-01-20 RX ORDER — LOSARTAN POTASSIUM AND HYDROCHLOROTHIAZIDE 25; 100 MG/1; MG/1
TABLET ORAL
Qty: 30 TABLET | Refills: 5 | Status: SHIPPED | OUTPATIENT
Start: 2022-01-20 | End: 2022-07-08

## 2022-01-24 RX ORDER — PEN NEEDLE, DIABETIC 31 GX3/16"
NEEDLE, DISPOSABLE MISCELLANEOUS
Qty: 100 PEN NEEDLE | Refills: 5 | Status: SHIPPED | OUTPATIENT
Start: 2022-01-24

## 2022-01-24 NOTE — TELEPHONE ENCOUNTER
----- Message from [address] [place] [address]. Randy sent at 1/24/2022  2:19 PM EST -----  Regarding: BD Ultra Fine Mini pen Needles  Dr. Guillermo Botello  I am still having trouble getting Needles filled. I take two shots per day and the prescription is saying quantity of 100 or for 1-shot per day. Can the prescription be updated to show two (2) shots per day?     Thanks

## 2022-01-24 NOTE — TELEPHONE ENCOUNTER
Future Appointments:  Future Appointments   Date Time Provider Dim[place] Em   1/25/2022  2:30 PM DIABETES GROUP CLASS POLINA MURPHY BS AMB   2/14/2022  8:30 AM Brie Gleason, MD CLEM Dean BS AMB   2/14/2022  2:30 PM DIABETES GROUP CLASS POLINA MURPHY BS AMB        Last Appointment With Me:  11/3/2021     Requested Prescriptions     Pending Prescriptions Disp Refills    Insulin Needles, Disposable, (BD Ultra-Fine Mini Pen Needle) 31 gauge x 3/16\" ndle       Sig: Use twice daily \"E11.9\"

## 2022-01-25 ENCOUNTER — CLINICAL SUPPORT (OUTPATIENT)
Dept: DIABETES SERVICES | Age: 62
End: 2022-01-25
Payer: COMMERCIAL

## 2022-01-25 DIAGNOSIS — E11.65 UNCONTROLLED TYPE 2 DIABETES MELLITUS WITH HYPERGLYCEMIA (HCC): Primary | ICD-10-CM

## 2022-01-25 PROCEDURE — G0109 DIAB MANAGE TRN IND/GROUP: HCPCS | Performed by: DIETITIAN, REGISTERED

## 2022-01-27 NOTE — PROGRESS NOTES
[address] [place] [address] Diabetes Health  Diabetes Self-Management Education & Support Program  Encounter note    SUMMARY  Diabetes self-care management training was completed related to Healthy coping and Problem solving. The participant will return on February 14 to continue DSMES related to Healthy eating and Monitoring. The participant did not identify SMART Goal(s): - see below, and will practice knowledge and skills related to reducing risks, being active and medications and healthy coping and problem solving to improve diabetes self-management. EVALUATION:  Mr. Yemi Tan uses prayer, walking/exercise and music as coping strategies techniques to help manage stress. He participated in guided meditation exercises, asked questions about diabetes publications and expressed an interest in using online resources to aid in [facility] knowledge and practices. He plans to use the Diabetes Food Hub to help plan and design meals. RECOMMENDATIONS:  Use online resources to look up nutrient content of meals for meal planning prior to eating out. Next provider visit is scheduled for PCP, 2/14/2022 and DSMES Class 2 make up class 2/14/2022       SMART GOAL(S)  1. Practice portion control over the next 2 weeks. ACHIEVEMENT OF GOAL(S)  [] 0-24%     [] 25-49%     [] 50-74%     [x] %       DATE DSMES TOPIC EVALUATION     1/26/2022 HOW DO I FIND SUPPORT TO TACKLE THIS CONDITION?   a. Normal responses to diabetes diagnosis or complication    Shock    Anger & resentment    Guilt/self-blame    Sadness & worry    Depression     Anxiety    Pregnancy   b. Constructive strategies to normal responses     Exploring feelings & attitudes    Motivation: Cost versus benefits analysis    Problem-solving: Chain analysis    Obtaining support: Communicating   i. Family & friends   ii. Health team   iii.  Community   c. Stress    Symptoms    Managing stress    Fill your tool kit        The participant can identify people that support them in caring for their diabetes health:  Yes, friends and PCP    The participant would like to pursue the following in keeping themselves healthy after completing the program:  Use BG logs to establish patterns and problem solve hyperglycemia events. The participant would benefit from using problem solving strategies to help develop solutions to barriers identified in his diabetes self-management skills. DATE DSMES TOPIC EVALUATION     1/26/2022 HOW DO I FIGURE OUT WHAT'S INFLUENCING MY BLOOD GLUCOSES?   a. Problem solving using SOAR    Set goals    Sort options    Arrive at a plan    Reaffirm plan   b. Common problems in diabetes self-care    Hypoglycemia    Hyperglycemia    Sick days   c. Pattern management   d. Disaster preparedness plan        The participant has an action plan for    Hypoglycemia Yes   Hyperglycemia Yes   Sick days Yes   During disasters No    The participant would benefit from putting together a disaster preparedness plan. Leana Florez RD on 1/26/2022 at 7:43 PM    I have personally reviewed the health record, including provider notes, laboratory data and current medications before making these care and education recommendations. The time spent in this effort is included in the total time.   Total minutes: 120

## 2022-02-03 LAB
ALBUMIN SERPL-MCNC: 3.7 G/DL (ref 3.8–4.8)
ALBUMIN/CREAT UR: 3 MG/G CREAT (ref 0–29)
ALBUMIN/GLOB SERPL: 1.2 {RATIO} (ref 1.2–2.2)
ALP SERPL-CCNC: 77 IU/L (ref 44–121)
ALT SERPL-CCNC: 26 IU/L (ref 0–44)
AST SERPL-CCNC: 29 IU/L (ref 0–40)
BASOPHILS # BLD AUTO: 0 X10E3/UL (ref 0–0.2)
BASOPHILS NFR BLD AUTO: 1 %
BILIRUB SERPL-MCNC: 0.5 MG/DL (ref 0–1.2)
BUN SERPL-MCNC: 13 MG/DL (ref 8–27)
BUN/CREAT SERPL: 16 (ref 10–24)
CALCIUM SERPL-MCNC: 9.3 MG/DL (ref 8.6–10.2)
CHLORIDE SERPL-SCNC: 102 MMOL/L (ref 96–106)
CHOLEST SERPL-MCNC: 155 MG/DL (ref 100–199)
CO2 SERPL-SCNC: 20 MMOL/L (ref 20–29)
CREAT SERPL-MCNC: 0.81 MG/DL (ref 0.76–1.27)
CREAT UR-MCNC: 114.4 MG/DL
EOSINOPHIL # BLD AUTO: 0.2 X10E3/UL (ref 0–0.4)
EOSINOPHIL NFR BLD AUTO: 2 %
ERYTHROCYTE [DISTWIDTH] IN BLOOD BY AUTOMATED COUNT: 13.8 % (ref 11.6–15.4)
EST. AVERAGE GLUCOSE BLD GHB EST-MCNC: 229 MG/DL
GLOBULIN SER CALC-MCNC: 3 G/DL (ref 1.5–4.5)
GLUCOSE SERPL-MCNC: 106 MG/DL (ref 65–99)
HBA1C MFR BLD: 9.6 % (ref 4.8–5.6)
HCT VFR BLD AUTO: 45.4 % (ref 37.5–51)
HDLC SERPL-MCNC: 51 MG/DL
HGB BLD-MCNC: 14.7 G/DL (ref 13–17.7)
IMM GRANULOCYTES # BLD AUTO: 0 X10E3/UL (ref 0–0.1)
IMM GRANULOCYTES NFR BLD AUTO: 0 %
IMP & REVIEW OF LAB RESULTS: NORMAL
LDLC SERPL CALC-MCNC: 90 MG/DL (ref 0–99)
LYMPHOCYTES # BLD AUTO: 2 X10E3/UL (ref 0.7–3.1)
LYMPHOCYTES NFR BLD AUTO: 28 %
MCH RBC QN AUTO: 27 PG (ref 26.6–33)
MCHC RBC AUTO-ENTMCNC: 32.4 G/DL (ref 31.5–35.7)
MCV RBC AUTO: 83 FL (ref 79–97)
MICROALBUMIN UR-MCNC: 3.5 UG/ML
MONOCYTES # BLD AUTO: 0.6 X10E3/UL (ref 0.1–0.9)
MONOCYTES NFR BLD AUTO: 9 %
NEUTROPHILS # BLD AUTO: 4.1 X10E3/UL (ref 1.4–7)
NEUTROPHILS NFR BLD AUTO: 60 %
PLATELET # BLD AUTO: 268 X10E3/UL (ref 150–450)
POTASSIUM SERPL-SCNC: 3.7 MMOL/L (ref 3.5–5.2)
PROT SERPL-MCNC: 6.7 G/DL (ref 6–8.5)
RBC # BLD AUTO: 5.45 X10E6/UL (ref 4.14–5.8)
SODIUM SERPL-SCNC: 140 MMOL/L (ref 134–144)
TRIGL SERPL-MCNC: 74 MG/DL (ref 0–149)
VLDLC SERPL CALC-MCNC: 14 MG/DL (ref 5–40)
WBC # BLD AUTO: 7 X10E3/UL (ref 3.4–10.8)

## 2022-02-07 RX ORDER — METFORMIN HYDROCHLORIDE 500 MG/1
TABLET, EXTENDED RELEASE ORAL
Qty: 120 TABLET | Refills: 5 | Status: SHIPPED | OUTPATIENT
Start: 2022-02-07 | End: 2022-09-06

## 2022-02-14 ENCOUNTER — CLINICAL SUPPORT (OUTPATIENT)
Dept: DIABETES SERVICES | Age: 62
End: 2022-02-14
Payer: COMMERCIAL

## 2022-02-14 ENCOUNTER — OFFICE VISIT (OUTPATIENT)
Dept: INTERNAL MEDICINE CLINIC | Age: 62
End: 2022-02-14

## 2022-02-14 VITALS
RESPIRATION RATE: 14 BRPM | HEART RATE: 74 BPM | TEMPERATURE: 98.3 F | BODY MASS INDEX: 45.74 KG/M2 | HEIGHT: 66 IN | SYSTOLIC BLOOD PRESSURE: 129 MMHG | OXYGEN SATURATION: 97 % | WEIGHT: 284.6 LBS | DIASTOLIC BLOOD PRESSURE: 75 MMHG

## 2022-02-14 DIAGNOSIS — E11.65 UNCONTROLLED TYPE 2 DIABETES MELLITUS WITH HYPERGLYCEMIA (HCC): Primary | ICD-10-CM

## 2022-02-14 DIAGNOSIS — I10 ESSENTIAL HYPERTENSION: ICD-10-CM

## 2022-02-14 DIAGNOSIS — M12.811 ROTATOR CUFF ARTHROPATHY, RIGHT: ICD-10-CM

## 2022-02-14 DIAGNOSIS — E11.65 TYPE 2 DIABETES MELLITUS WITH HYPERGLYCEMIA, WITH LONG-TERM CURRENT USE OF INSULIN (HCC): Primary | ICD-10-CM

## 2022-02-14 DIAGNOSIS — Z79.4 TYPE 2 DIABETES MELLITUS WITH HYPERGLYCEMIA, WITH LONG-TERM CURRENT USE OF INSULIN (HCC): Primary | ICD-10-CM

## 2022-02-14 PROCEDURE — 99213 OFFICE O/P EST LOW 20 MIN: CPT | Performed by: INTERNAL MEDICINE

## 2022-02-14 PROCEDURE — G0109 DIAB MANAGE TRN IND/GROUP: HCPCS

## 2022-02-14 NOTE — PROGRESS NOTES
Diabetic ROS - medication compliance: compliant all of the time, diabetic diet compliance: compliant most of the time, home glucose monitoring: is performed sporadically, fasting values range 120 up to 140, nonfasting values range 160, further diabetic ROS: no polyuria or polydipsia, no chest pain, dyspnea or TIA's, no numbness, tingling or pain in extremities, no unusual visual symptoms, no hypoglycemia, no medication side effects noted. New concerns: wants dietary help. Right shoulder sore at times   Diabetic exam: heart sounds normal rate, regular rhythm, normal S1, S2, no murmurs, rubs, clicks or gallops, chest clear. Lab review: labs reviewed, I note that glycosylated hemoglobin abnormal   Results for orders placed or performed in visit on 11/03/21   MICROALBUMIN, UR, RAND W/ MICROALB/CREAT RATIO   Result Value Ref Range    Creatinine, urine 114.4 Not Estab. mg/dL    Microalbumin, urine 3.5 Not Estab. ug/mL    Microalb/Creat ratio (ug/mg creat.) 3 0 - 29 mg/g creat   CBC WITH AUTOMATED DIFF   Result Value Ref Range    WBC 7.0 3.4 - 10.8 x10E3/uL    RBC 5.45 4.14 - 5.80 x10E6/uL    HGB 14.7 13.0 - 17.7 g/dL    HCT 45.4 37.5 - 51.0 %    MCV 83 79 - 97 fL    MCH 27.0 26.6 - 33.0 pg    MCHC 32.4 31.5 - 35.7 g/dL    RDW 13.8 11.6 - 15.4 %    PLATELET 521 386 - 703 x10E3/uL    NEUTROPHILS 60 Not Estab. %    Lymphocytes 28 Not Estab. %    MONOCYTES 9 Not Estab. %    EOSINOPHILS 2 Not Estab. %    BASOPHILS 1 Not Estab. %    ABS. NEUTROPHILS 4.1 1.4 - 7.0 x10E3/uL    Abs Lymphocytes 2.0 0.7 - 3.1 x10E3/uL    ABS. MONOCYTES 0.6 0.1 - 0.9 x10E3/uL    ABS. EOSINOPHILS 0.2 0.0 - 0.4 x10E3/uL    ABS. BASOPHILS 0.0 0.0 - 0.2 x10E3/uL    IMMATURE GRANULOCYTES 0 Not Estab. %    ABS. IMM.  GRANS. 0.0 0.0 - 0.1 x10E3/uL   LIPID PANEL   Result Value Ref Range    Cholesterol, total 155 100 - 199 mg/dL    Triglyceride 74 0 - 149 mg/dL    HDL Cholesterol 51 >39 mg/dL    VLDL, calculated 14 5 - 40 mg/dL    LDL, calculated 90 0 - 99 mg/dL   METABOLIC PANEL, COMPREHENSIVE   Result Value Ref Range    Glucose 106 (H) 65 - 99 mg/dL    BUN 13 8 - 27 mg/dL    Creatinine 0.81 0.76 - 1.27 mg/dL    GFR est non-AA 96 >59 mL/min/1.73    GFR est  >59 mL/min/1.73    BUN/Creatinine ratio 16 10 - 24    Sodium 140 134 - 144 mmol/L    Potassium 3.7 3.5 - 5.2 mmol/L    Chloride 102 96 - 106 mmol/L    CO2 20 20 - 29 mmol/L    Calcium 9.3 8.6 - 10.2 mg/dL    Protein, total 6.7 6.0 - 8.5 g/dL    Albumin 3.7 (L) 3.8 - 4.8 g/dL    GLOBULIN, TOTAL 3.0 1.5 - 4.5 g/dL    A-G Ratio 1.2 1.2 - 2.2    Bilirubin, total 0.5 0.0 - 1.2 mg/dL    Alk. phosphatase 77 44 - 121 IU/L    AST (SGOT) 29 0 - 40 IU/L    ALT (SGPT) 26 0 - 44 IU/L   HEMOGLOBIN A1C WITH EAG   Result Value Ref Range    Hemoglobin A1c 9.6 (H) 4.8 - 5.6 %    Estimated average glucose 229 mg/dL   CVD REPORT   Result Value Ref Range    INTERPRETATION Note      Lab Results   Component Value Date/Time    Hemoglobin A1c 9.6 (H) 02/02/2022 12:00 AM    Hemoglobin A1c (POC) 9.0 10/21/2021 03:53 PM     .   Vitals:    02/14/22 0824   BP: 129/75   Pulse: 74   Resp: 14   Temp: 98.3 °F (36.8 °C)   TempSrc: Temporal   SpO2: 97%   Weight: 284 lb 9.6 oz (129.1 kg)   Height: 5' 6\" (1.676 m)     S1 and S2 normal, no murmurs, clicks, gallops or rubs. Regular rate and rhythm. Chest is clear; no wheezes or rales. No edema or JVD. A right shoulder exam was performed. GENERAL: no acute distress, overweight  SKIN: intact  DEFORMITY: none  TENDERNESS: mild  ROM: limited by pain  STRENGTH: limited by pain  NEUROLOGICAL EXAM: normal  Assessment: Diabetes Mellitus: poorly controlled. Consider trulicity more exercise  Plan: See orders for this visit as documented in the electronic medical record. Diabetic issues reviewed with him: referral to Diabetic Education department and diabetic diet discussed in detail, written exchange diet givenesoculg quinton mulligan dna hgih c1A sa nilusni laidnarp ro yticilurt redi. osno    1.  Type 2 diabetes mellitus with hyperglycemia, with long-term current use of insulin (HCC)  exercise    2. Essential hypertension  controlled    3.  Rotator cuff arthropathy, right  reviwed exercises

## 2022-02-14 NOTE — PROGRESS NOTES
Reviewed record in preparation for visit and have obtained necessary documentation. Identified pt with two pt identifiers(name and ). Chief Complaint   Patient presents with    Follow-up     Vitals:    22 0824   BP: 129/75   Pulse: 74   Resp: 14   Temp: 98.3 °F (36.8 °C)   TempSrc: Temporal   SpO2: 97%   Weight: 284 lb 9.6 oz (129.1 kg)   Height: 5' 6\" (1.676 m)        Health Maintenance Due   Topic Date Due    Shingles Vaccine (2 of 2) 2021       Mr. Corazon Crouch has a reminder for a \"due or due soon\" health maintenance. I have asked that he discuss this further with his primary care provider for follow-up on this health maintenance. Coordination of Care Questionnaire:  :     1) Have you been to an emergency room, urgent care clinic since your last visit? no   Hospitalized since your last visit? no             2) Have you seen or consulted any other health care providers outside of [address] [place] [address] since your last visit? no  (Include any pap smears or colon screenings in this section.)    3) In the event something were to happen to you and you were unable to speak on your behalf, do you have an Advance Directive/ Living Will in place stating your wishes? NO    Do you have an Advance Directive on file? no    4) Are you interested in receiving information on Advance Directives? YES    Patient is accompanied by self I have received verbal consent from Luz Marina Remy to discuss any/all medical information while they are present in the room.

## 2022-02-15 NOTE — PROGRESS NOTES
[facility] Program for Diabetes Health  Diabetes Self-Management Education & Support Program  Encounter note    SUMMARY  Diabetes self-care management training was completed related to Healthy eating and Monitoring. The participant will return the end of March to continue DSMES related to 6 week follow up. The participant did not identify SMART Goal, and will practice knowledge and skills related to healthy eating and monitoring and being active and medications to improve diabetes self-management. EVALUATION:  Participant was engaged with the class discussion on what can I eat and how does blood sugar monitoring help. He states that he would need to have additional assistance with this topic and will schedule a one on one session for clarification of the material.    RECOMMENDATIONS:  Encouraged him to monitor his carbohydrate portions at meal times, continue to check his blood sugars daily, and increase physical activities each week. Next provider visit is scheduled for Dr. Debbie Merritt on 5/16/22       SMART GOAL(S)  1. Practice portion control for all three meals daily over the next two weeks. ACHIEVEMENT OF GOAL(S)  [] 0-24%     [] 25-49%     [] 50-74%     [x] %  2. Increase physical activity by using his stationary bike for 30 minutes 3 times over the next week  ACHIEVEMENT OF GOAL(S)  [] 0-24%     [] 25-49%     [x] 50-74%     [] %     DATE DSMES TOPIC EVALUATION     2/15/2022 WHAT CAN I EAT?   a. General principles   b. Determining a healthy weight   c. Nutritional terms & tools    Healthy Plate method    Carbohydrate Counting    Reading food labels    Free apps   d. Pregnancy recommendations      The participant    Uses Healthy Plate principles in constructing meals No   Reads food labels in choosing acceptable foods Yes    The participant would benefit from starting to eat a healthy plate 3 meals per day.      DATE DSMES TOPIC EVALUATION     2/15/2022 HOW CAN BLOOD GLUCOSE MONITORING HELP ME?   a. Value of blood glucose monitoring   b. Realistic expectations   c. Blood glucose monitoring targets   d. Target adjustments   e. Setting a1c & blood glucose targets with provider   f. Meter selection    g. Technique for obtaining blood droplet   h. Blood glucose testing sites   i. Determining best times to test   j. Pregnancy recommendations   k. Data sharing with provider        The participant    Can demonstrate their glucometer procedure Yes   Logs their BG readings Yes    The participant would benefit from logging blood sugar reading and taking log to providers visits to review with them. Jyoti Navas RN on 2/15/2022 at 8:46 AM    I have personally reviewed the health record, including provider notes, laboratory data and current medications before making these care and education recommendations. The time spent in this effort is included in the total time.   Total minutes: 120

## 2022-03-07 RX ORDER — LOVASTATIN 40 MG/1
TABLET ORAL
Qty: 90 TABLET | Refills: 3 | Status: SHIPPED | OUTPATIENT
Start: 2022-03-07 | End: 2022-06-20 | Stop reason: SDUPTHER

## 2022-03-19 PROBLEM — I49.1 PAC (PREMATURE ATRIAL CONTRACTION): Status: ACTIVE | Noted: 2021-05-03

## 2022-04-27 ENCOUNTER — PATIENT OUTREACH (OUTPATIENT)
Dept: CASE MANAGEMENT | Age: 62
End: 2022-04-27

## 2022-04-27 NOTE — PROGRESS NOTES
Ambulatory Care Management Note    Date/Time:  4/27/2022 11:47 AM    This patient was received as a referral from [facility] [employer]. Ambulatory Care Manager outreached to patient today to offer care management services. Introduction to self and role of care manager provided. Patient accepted care management services at this time. Follow up call scheduled at this time. Patient has Ambulatory Care Manager's contact number for for any questions or concerns.

## 2022-05-02 ENCOUNTER — PATIENT OUTREACH (OUTPATIENT)
Dept: CASE MANAGEMENT | Age: 62
End: 2022-05-02

## 2022-05-02 NOTE — PROGRESS NOTES
Ambulatory Care Management Note    Date/Time:  5/2/2022 4:15 PM    This Ambulatory Care Manager (ACM) reviewed and updated the following screenings during this call; general assessment, SDOH assessments and medication reconciliation     Patient's challenges to self-management identified:   lack of knowledge about disease      Medication Management:  good adherence and poor understanding    Advance Care Planning:   Does patient have an Advance Directive:  not on file, will address at future call    [employer], Referrals, and Durable Medical Equipment: none      Health Maintenance Due   Topic Date Due    Pneumococcal 0-64 years (2 - PCV) 12/01/2021    A1C test (Diabetic or Prediabetic)  05/02/2022     Health Maintenance Reviewed: patient has appointment on 5/16 with Dr. Dulce Burroughs and will address health maintenance items    Patient was asked to consider health care goals that they would like to focus on with this ACM. ACM will follow up with patient to discuss goals and establish care plan in the next 7-14 days.        PCP/Specialist follow up:   Future Appointments   Date Time Provider Dimas Strickland   5/16/2022  9:15 AM Ivory Martinez MD [facility] AMB

## 2022-05-13 ENCOUNTER — PATIENT OUTREACH (OUTPATIENT)
Dept: CASE MANAGEMENT | Age: 62
End: 2022-05-13

## 2022-05-16 NOTE — PROGRESS NOTES
Ambulatory Care Management Note      Date/Time:  5/16/2022 11:20 AM    This patient was received as a referral from [facility]. Top Challenges reviewed with the provider   No challenges reviewed with provider at this time. Ambulatory  contacted patient for discussion and case management of diabetes. Summary of patients top problems:   1. Diabetes: A1c 9.6 in February. Currently taking metformin, victoza, jardince, and toujeo. Compliant with all medications. No changes to meds at last appointment, advised to work on diet and exercise. Also completed 4 classes with [address] [place] [address] diabetes [facility]. Doing much better with diabetic diet and is walking reguarily and riding his stationary bike for exercise. Taking fasting blood sugar daily and keeping log. 2. No ACP on file  Patient's challenges to self management identified:   lack of knowledge about disease      Medication Management:  good adherence and good understanding    Advance Care Planning:   Does patient have an Advance Directive:  not on file, will address at future call    [employer], Referrals, and Durable Medical Equipment: none    PCP/Specialist follow up:   Future Appointments   Date Time Provider Dimas Strickland   6/6/2022  9:30 AM MD [facility] BS AMB        Goals      Skills and education necessary to properly manage diabetes      5/16/22 - A1c 9.6 in February. Currently taking metformin, victoza, jardiance, and toujeo. Reports being compliant with all medications. Has good understanding of each medication. Discussed that his current a1c is, average blood sugar, and goal a1c. No changes were made to meds at last appointment, advised to work on diet and exercise. [facility]. Doing much better with diabetic diet and is walking reguarily and riding his stationary bike for exercise.  Taking fasting blood sugar daily and keeping log. Patient has upcoming appointment on 6/6 with Dr. Virgie Gomes that he will attend. Patient will take log to appointment. Patient verbalized understanding of all information discussed. Patient has this Ambulatory Care Manager's contact information for any further questions, concerns, or needs.

## 2022-06-07 RX ORDER — INSULIN GLARGINE 300 U/ML
INJECTION, SOLUTION SUBCUTANEOUS
Qty: 7.5 ML | Refills: 3 | Status: SHIPPED | OUTPATIENT
Start: 2022-06-07 | End: 2022-06-14 | Stop reason: SDUPTHER

## 2022-06-14 ENCOUNTER — PATIENT OUTREACH (OUTPATIENT)
Dept: CASE MANAGEMENT | Age: 62
End: 2022-06-14

## 2022-06-14 ENCOUNTER — OFFICE VISIT (OUTPATIENT)
Dept: INTERNAL MEDICINE CLINIC | Age: 62
End: 2022-06-14
Payer: COMMERCIAL

## 2022-06-14 VITALS
BODY MASS INDEX: 45.96 KG/M2 | OXYGEN SATURATION: 97 % | TEMPERATURE: 98.2 F | HEART RATE: 56 BPM | DIASTOLIC BLOOD PRESSURE: 79 MMHG | SYSTOLIC BLOOD PRESSURE: 124 MMHG | RESPIRATION RATE: 18 BRPM | WEIGHT: 286 LBS | HEIGHT: 66 IN

## 2022-06-14 DIAGNOSIS — E66.01 MORBID OBESITY WITH BMI OF 45.0-49.9, ADULT (HCC): ICD-10-CM

## 2022-06-14 DIAGNOSIS — I10 ESSENTIAL HYPERTENSION: ICD-10-CM

## 2022-06-14 DIAGNOSIS — Z79.4 TYPE 2 DIABETES MELLITUS WITH HYPERGLYCEMIA, WITH LONG-TERM CURRENT USE OF INSULIN (HCC): Primary | ICD-10-CM

## 2022-06-14 DIAGNOSIS — E78.5 DYSLIPIDEMIA, GOAL LDL BELOW 100: ICD-10-CM

## 2022-06-14 DIAGNOSIS — S86.812A STRAIN OF CALF MUSCLE, LEFT, INITIAL ENCOUNTER: ICD-10-CM

## 2022-06-14 DIAGNOSIS — E11.65 TYPE 2 DIABETES MELLITUS WITH HYPERGLYCEMIA, WITH LONG-TERM CURRENT USE OF INSULIN (HCC): Primary | ICD-10-CM

## 2022-06-14 LAB — HBA1C MFR BLD HPLC: 9 %

## 2022-06-14 PROCEDURE — 83036 HEMOGLOBIN GLYCOSYLATED A1C: CPT | Performed by: INTERNAL MEDICINE

## 2022-06-14 PROCEDURE — 99214 OFFICE O/P EST MOD 30 MIN: CPT | Performed by: INTERNAL MEDICINE

## 2022-06-14 RX ORDER — INSULIN GLARGINE 300 U/ML
35 INJECTION, SOLUTION SUBCUTANEOUS
Qty: 7.5 ML | Refills: 3 | Status: SHIPPED | OUTPATIENT
Start: 2022-06-14

## 2022-06-14 NOTE — PROGRESS NOTES
Diabetic ROS - medication compliance: compliant all of the time, diabetic diet compliance: compliant most of the time, home glucose monitoring: is performed sporadically, fasting values range 120 up to 140, nonfasting values range 160, further diabetic ROS: no polyuria or polydipsia, no chest pain, dyspnea or TIA's, no numbness, tingling or pain in extremities, no unusual visual symptoms, no hypoglycemia, no medication side effects noted. New concerns: wants dietary help. [facility]  Sugars under 200 takes insulin in morning  Left calf was sore for 4 days after injury on steps now better  Diabetic exam: heart sounds normal rate, regular rhythm, normal S1, S2, no murmurs, rubs, clicks or gallops, chest clear. Lab review: labs reviewed, I note that glycosylated hemoglobin abnormal   Results for orders placed or performed in visit on 11/03/21   MICROALBUMIN, UR, RAND W/ MICROALB/CREAT RATIO   Result Value Ref Range    Creatinine, urine 114.4 Not Estab. mg/dL    Microalbumin, urine 3.5 Not Estab. ug/mL    Microalb/Creat ratio (ug/mg creat.) 3 0 - 29 mg/g creat   CBC WITH AUTOMATED DIFF   Result Value Ref Range    WBC 7.0 3.4 - 10.8 x10E3/uL    RBC 5.45 4.14 - 5.80 x10E6/uL    HGB 14.7 13.0 - 17.7 g/dL    HCT 45.4 37.5 - 51.0 %    MCV 83 79 - 97 fL    MCH 27.0 26.6 - 33.0 pg    MCHC 32.4 31.5 - 35.7 g/dL    RDW 13.8 11.6 - 15.4 %    PLATELET 884 658 - 883 x10E3/uL    NEUTROPHILS 60 Not Estab. %    Lymphocytes 28 Not Estab. %    MONOCYTES 9 Not Estab. %    EOSINOPHILS 2 Not Estab. %    BASOPHILS 1 Not Estab. %    ABS. NEUTROPHILS 4.1 1.4 - 7.0 x10E3/uL    Abs Lymphocytes 2.0 0.7 - 3.1 x10E3/uL    ABS. MONOCYTES 0.6 0.1 - 0.9 x10E3/uL    ABS. EOSINOPHILS 0.2 0.0 - 0.4 x10E3/uL    ABS. BASOPHILS 0.0 0.0 - 0.2 x10E3/uL    IMMATURE GRANULOCYTES 0 Not Estab. %    ABS. IMM.  GRANS. 0.0 0.0 - 0.1 x10E3/uL   LIPID PANEL   Result Value Ref Range    Cholesterol, total 155 100 - 199 mg/dL    Triglyceride 74 0 - 149 mg/dL    HDL Cholesterol 51 >39 mg/dL    VLDL, calculated 14 5 - 40 mg/dL    LDL, calculated 90 0 - 99 mg/dL   METABOLIC PANEL, COMPREHENSIVE   Result Value Ref Range    Glucose 106 (H) 65 - 99 mg/dL    BUN 13 8 - 27 mg/dL    Creatinine 0.81 0.76 - 1.27 mg/dL    GFR est non-AA 96 >59 mL/min/1.73    GFR est  >59 mL/min/1.73    BUN/Creatinine ratio 16 10 - 24    Sodium 140 134 - 144 mmol/L    Potassium 3.7 3.5 - 5.2 mmol/L    Chloride 102 96 - 106 mmol/L    CO2 20 20 - 29 mmol/L    Calcium 9.3 8.6 - 10.2 mg/dL    Protein, total 6.7 6.0 - 8.5 g/dL    Albumin 3.7 (L) 3.8 - 4.8 g/dL    GLOBULIN, TOTAL 3.0 1.5 - 4.5 g/dL    A-G Ratio 1.2 1.2 - 2.2    Bilirubin, total 0.5 0.0 - 1.2 mg/dL    Alk. phosphatase 77 44 - 121 IU/L    AST (SGOT) 29 0 - 40 IU/L    ALT (SGPT) 26 0 - 44 IU/L   HEMOGLOBIN A1C WITH EAG   Result Value Ref Range    Hemoglobin A1c 9.6 (H) 4.8 - 5.6 %    Estimated average glucose 229 mg/dL   CVD REPORT   Result Value Ref Range    INTERPRETATION Note      Lab Results   Component Value Date/Time    Hemoglobin A1c 9.6 (H) 02/02/2022 12:00 AM    Hemoglobin A1c (POC) 9.0 10/21/2021 03:53 PM     .   Vitals:    06/14/22 0829   BP: 124/79   Pulse: (!) 56   Resp: 18   Temp: 98.2 °F (36.8 °C)   TempSrc: Temporal   SpO2: 97%   Weight: 286 lb (129.7 kg)   Height: 5' 6\" (1.676 m)     S1 and S2 normal, no murmurs, clicks, gallops or rubs. Regular rate and rhythm. Chest is clear; no wheezes or rales. No edema or JVD. Left calf normal  1. Type 2 diabetes mellitus with hyperglycemia, with long-term current use of insulin ([facility])  Labs in 4 months  - AMB POC HEMOGLOBIN A1C  - LIPID PANEL; Future  - METABOLIC PANEL, COMPREHENSIVE; Future  - HEMOGLOBIN A1C WITH EAG; Future  - MICROALBUMIN, UR, RAND W/ MICROALB/CREAT RATIO; Future  - LIPID PANEL  - METABOLIC PANEL, COMPREHENSIVE  - HEMOGLOBIN A1C WITH EAG  - MICROALBUMIN, UR, RAND W/ MICROALB/CREAT RATIO    2.  Morbid obesity with BMI of 45.0-49.9, adult ([facility] [address]  No change    3. Strain of calf muscle, left, initial encounter  resolved    4. Essential hypertension  controlled    5. Dyslipidemia, goal LDL below 100  Stable    Try HS insulin can titrate    Requested Prescriptions     Signed Prescriptions Disp Refills    insulin glargine U-300 conc (Toujeo SoloStar U-300 Insulin) 300 unit/mL (1.5 mL) inpn pen 7.5 mL 3     Si Units by SubCUTAneous route nightly.

## 2022-06-14 NOTE — PROGRESS NOTES
Rustam Hicks is a 64 y.o. male    Chief Complaint   Patient presents with    Diabetes     f/u    Leg Pain     back of LL leg x 5 days       Visit Vitals  /79   Pulse (!) 56   Temp 98.2 °F (36.8 °C) (Temporal)   Resp 18   Ht 5' 6\" (1.676 m)   Wt 286 lb (129.7 kg)   SpO2 97%   BMI 46.16 kg/m²       3 most recent PHQ Screens 6/14/2022   Little interest or pleasure in doing things Not at all   Feeling down, depressed, irritable, or hopeless Not at all   Total Score PHQ 2 0   Trouble falling or staying asleep, or sleeping too much -   Feeling tired or having little energy -   Poor appetite, weight loss, or overeating -   Feeling bad about yourself - or that you are a failure or have let yourself or your family down -   Trouble concentrating on things such as school, work, reading, or watching TV -   Moving or speaking so slowly that other people could have noticed; or the opposite being so fidgety that others notice -   Thoughts of being better off dead, or hurting yourself in some way -   PHQ 9 Score -   How difficult have these problems made it for you to do your work, take care of your home and get along with others -       Fall Risk Assessment, last 12 mths 5/21/2019   Able to walk? Yes   Fall in past 12 months? No       Abuse Screening Questionnaire 10/4/2021   Do you ever feel afraid of your partner? N   Are you in a relationship with someone who physically or mentally threatens you? N   Is it safe for you to go home? Y       1. Have you been to the ER, urgent care clinic since your last visit? Hospitalized since your last visit? No     2. Have you seen or consulted any other health care providers outside of the [address] [place] [address] since your last visit? Include any pap smears or colon screening.  No

## 2022-06-20 RX ORDER — LOVASTATIN 40 MG/1
40 TABLET ORAL EVERY EVENING
Qty: 90 TABLET | Refills: 3 | Status: SHIPPED | OUTPATIENT
Start: 2022-06-20 | End: 2022-11-01 | Stop reason: ALTCHOICE

## 2022-06-20 NOTE — TELEPHONE ENCOUNTER
Future Appointments:  Future Appointments   Date Time Provider Dimas Strickland   10/17/2022  8:30 AM Brengel, Edna Cabot, MD [facility] AMB        Last Appointment With Me:  6/14/2022     Requested Prescriptions     Pending Prescriptions Disp Refills    lovastatin (MEVACOR) 40 mg tablet 90 Tablet 3     Sig: Take 1 Tablet by mouth every evening.

## 2022-07-01 ENCOUNTER — PATIENT OUTREACH (OUTPATIENT)
Dept: CASE MANAGEMENT | Age: 62
End: 2022-07-01

## 2022-07-01 NOTE — PROGRESS NOTES
Goals      Skills and education necessary to properly manage diabetes      5/16/22 - A1c 9.6 in February. Currently taking metformin, victoza, jardiance, and toujeo. Reports being compliant with all medications. Has good understanding of each medication. Discussed that his current a1c is, average blood sugar, and goal a1c. No changes were made to meds at last appointment, advised to work on diet and exercise. Also completed 4 classes with [address] [place] [address] diabetes health. Doing much better with diabetic diet and is walking reguarily and riding his stationary bike for exercise. Taking fasting blood sugar daily and keeping log. Patient has upcoming appointment on 6/6 with Dr. Matthew Will that he will attend. Patient will take log to appointment. 6/14/22 - Patient attended appointment with Dr. Matthew Will today. A1c 9.0. No changes to medication doses, however, was advised to take the Toujeo at night rather than in the morning in order to help with the overnight and AM fasting blood sugar. Patient will start taking at night and continue to monitor. Will follow-up in 1-2 weeks for blood sugar log and will forward to Dr. Matthew Will at that time. 7/1/22 - Patient was out running errands at this time of the call and did not have blood sugar log with him. Reports that he is taking the toujeo at night and has also changed his eating habits. Reports that highest fasting blood sugar has been 130's. Did have one low blood sugar < 70. Reviewed hypoglycemia precautions and treatment. Patient will continue plan of care.

## 2022-07-08 RX ORDER — EMPAGLIFLOZIN 25 MG/1
TABLET, FILM COATED ORAL
Qty: 30 TABLET | Refills: 5 | Status: SHIPPED | OUTPATIENT
Start: 2022-07-08

## 2022-07-08 RX ORDER — LOSARTAN POTASSIUM AND HYDROCHLOROTHIAZIDE 25; 100 MG/1; MG/1
TABLET ORAL
Qty: 30 TABLET | Refills: 5 | Status: SHIPPED | OUTPATIENT
Start: 2022-07-08

## 2022-08-01 ENCOUNTER — PATIENT OUTREACH (OUTPATIENT)
Dept: CASE MANAGEMENT | Age: 62
End: 2022-08-01

## 2022-08-01 NOTE — ACP (ADVANCE CARE PLANNING)
8/1/22 - Patient reports that he has a medical directive at home that he did with his  a few years ago. Advised patient that he can upload a copy of the document through [employer] or he can bring in a copy to Dr. Kennedy Thomas office. Patient would prefer to bring in a copy and states that he will bring it with him to his next appointment.

## 2022-08-01 NOTE — PROGRESS NOTES
Patient has graduated from the Complex Case Management  program on 8/1/22. Patient/family has the ability to self-manage at this time. Care management goals have been completed. No further Ambulatory Care Manager follow up scheduled. Goals Addressed                   This Visit's Progress     COMPLETED: ACP        8/1/22 - Patient reports that he has a medical directive at home that he did with his  a few years ago. Advised patient that he can upload a copy of the document through [employer] or he can bring in a copy to Dr. Teodoro Cardenas office. Patient would prefer to bring in a copy and states that he will bring it with him to his next appointment. COMPLETED: Skills and education necessary to properly manage diabetes        5/16/22 - A1c 9.6 in February. Currently taking metformin, victoza, jardiance, and toujeo. Reports being compliant with all medications. Has good understanding of each medication. Discussed that his current a1c is, average blood sugar, and goal a1c. No changes were made to meds at last appointment, advised to work on diet and exercise. Also completed 4 classes with [employer] program for diabetes health. Doing much better with diabetic diet and is walking reguarily and riding his stationary bike for exercise. Taking fasting blood sugar daily and keeping log. Patient has upcoming appointment on 6/6 with Dr. Maddie Chaparro that he will attend. Patient will take log to appointment. 6/14/22 - Patient attended appointment with Dr. Maddie Chaparro today. A1c 9.0. No changes to medication doses, however, was advised to take the Toujeo at night rather than in the morning in order to help with the overnight and AM fasting blood sugar. Patient will start taking at night and continue to monitor. Will follow-up in 1-2 weeks for blood sugar log and will forward to Dr. Maddie Chaparro at that time. 7/1/22 - Patient was out running errands at this time of the call and did not have blood sugar log with him. Reports that he is taking the toujeo at night and has also changed his eating habits. Reports that highest fasting blood sugar has been 130's. Did have one low blood sugar < 70. Reviewed hypoglycemia precautions and treatment. Patient will continue plan of care. 8/1/22 - Patient reports that his blood sugars are doing excellent now that he has been consistently taking the toujeo at night. He has been consistent with diabetic diet and is exercising more. Feels he has a good understanding of diabetes management and declines any additional resources or education at this time. Patient has Ambulatory Care Manager's contact information for any further questions, concerns, or needs.   Patients upcoming visits:    Future Appointments   Date Time Provider Dimas Strickland   10/17/2022  8:30 AM Jose G Alcantar MD [facility]

## 2022-09-06 RX ORDER — METFORMIN HYDROCHLORIDE 500 MG/1
TABLET, EXTENDED RELEASE ORAL
Qty: 120 TABLET | Refills: 5 | Status: SHIPPED | OUTPATIENT
Start: 2022-09-06

## 2022-10-28 LAB
ALBUMIN SERPL-MCNC: 4.1 G/DL (ref 3.8–4.8)
ALBUMIN/CREAT UR: 5 MG/G CREAT (ref 0–29)
ALBUMIN/GLOB SERPL: 1.6 {RATIO} (ref 1.2–2.2)
ALP SERPL-CCNC: 76 IU/L (ref 44–121)
ALT SERPL-CCNC: 18 IU/L (ref 0–44)
AST SERPL-CCNC: 21 IU/L (ref 0–40)
BILIRUB SERPL-MCNC: 0.3 MG/DL (ref 0–1.2)
BUN SERPL-MCNC: 15 MG/DL (ref 8–27)
BUN/CREAT SERPL: 15 (ref 10–24)
CALCIUM SERPL-MCNC: 9.7 MG/DL (ref 8.6–10.2)
CHLORIDE SERPL-SCNC: 102 MMOL/L (ref 96–106)
CHOLEST SERPL-MCNC: 149 MG/DL (ref 100–199)
CO2 SERPL-SCNC: 24 MMOL/L (ref 20–29)
CREAT SERPL-MCNC: 0.97 MG/DL (ref 0.76–1.27)
CREAT UR-MCNC: 116.7 MG/DL
EGFR: 88 ML/MIN/1.73
EST. AVERAGE GLUCOSE BLD GHB EST-MCNC: 209 MG/DL
GLOBULIN SER CALC-MCNC: 2.6 G/DL (ref 1.5–4.5)
GLUCOSE SERPL-MCNC: 101 MG/DL (ref 70–99)
HBA1C MFR BLD: 8.9 % (ref 4.8–5.6)
HDLC SERPL-MCNC: 57 MG/DL
IMP & REVIEW OF LAB RESULTS: NORMAL
LDLC SERPL CALC-MCNC: 78 MG/DL (ref 0–99)
MICROALBUMIN UR-MCNC: 6.3 UG/ML
POTASSIUM SERPL-SCNC: 4.4 MMOL/L (ref 3.5–5.2)
PROT SERPL-MCNC: 6.7 G/DL (ref 6–8.5)
SODIUM SERPL-SCNC: 143 MMOL/L (ref 134–144)
TRIGL SERPL-MCNC: 68 MG/DL (ref 0–149)
VLDLC SERPL CALC-MCNC: 14 MG/DL (ref 5–40)

## 2022-11-01 ENCOUNTER — OFFICE VISIT (OUTPATIENT)
Dept: INTERNAL MEDICINE CLINIC | Age: 62
End: 2022-11-01
Payer: COMMERCIAL

## 2022-11-01 VITALS
TEMPERATURE: 98.1 F | HEART RATE: 64 BPM | BODY MASS INDEX: 46.19 KG/M2 | HEIGHT: 66 IN | WEIGHT: 287.4 LBS | SYSTOLIC BLOOD PRESSURE: 129 MMHG | DIASTOLIC BLOOD PRESSURE: 59 MMHG | RESPIRATION RATE: 18 BRPM | OXYGEN SATURATION: 95 %

## 2022-11-01 DIAGNOSIS — Z23 ENCOUNTER FOR IMMUNIZATION: ICD-10-CM

## 2022-11-01 DIAGNOSIS — E66.01 MORBID OBESITY WITH BMI OF 45.0-49.9, ADULT (HCC): ICD-10-CM

## 2022-11-01 DIAGNOSIS — E78.5 DYSLIPIDEMIA, GOAL LDL BELOW 100: ICD-10-CM

## 2022-11-01 DIAGNOSIS — Z79.4 TYPE 2 DIABETES MELLITUS WITH HYPERGLYCEMIA, WITH LONG-TERM CURRENT USE OF INSULIN (HCC): Primary | ICD-10-CM

## 2022-11-01 DIAGNOSIS — E11.65 TYPE 2 DIABETES MELLITUS WITH HYPERGLYCEMIA, WITH LONG-TERM CURRENT USE OF INSULIN (HCC): Primary | ICD-10-CM

## 2022-11-01 DIAGNOSIS — I10 ESSENTIAL HYPERTENSION: ICD-10-CM

## 2022-11-01 PROCEDURE — 99214 OFFICE O/P EST MOD 30 MIN: CPT | Performed by: INTERNAL MEDICINE

## 2022-11-01 PROCEDURE — 90686 IIV4 VACC NO PRSV 0.5 ML IM: CPT | Performed by: INTERNAL MEDICINE

## 2022-11-01 PROCEDURE — 90471 IMMUNIZATION ADMIN: CPT | Performed by: INTERNAL MEDICINE

## 2022-11-01 RX ORDER — ATORVASTATIN CALCIUM 20 MG/1
20 TABLET, FILM COATED ORAL DAILY
Qty: 90 TABLET | Refills: 3 | Status: SHIPPED | OUTPATIENT
Start: 2022-11-01

## 2022-11-01 NOTE — PROGRESS NOTES
Chief Complaint   Patient presents with    Diabetes     Follow up          Vitals:    11/01/22 0916   BP: (!) 129/59   Pulse: 64   Resp: 18   Temp: 98.1 °F (36.7 °C)   TempSrc: Temporal   SpO2: 95%   Weight: 287 lb 6.4 oz (130.4 kg)   Height: 5' 6\" (1.676 m)   PainSc:   0 - No pain       Current Outpatient Medications on File Prior to Visit   Medication Sig Dispense Refill    metFORMIN ER (GLUCOPHAGE XR) 500 mg tablet TAKE 4 TABLETS BY MOUTH EVERY DAY AS DIRECTED 120 Tablet 5    Jardiance 25 mg tablet TAKE 1 TABLET BY MOUTH DAILY 30 Tablet 5    losartan-hydroCHLOROthiazide (HYZAAR) 100-25 mg per tablet TAKE 1 TABLET BY MOUTH EVERY DAY 30 Tablet 5    lovastatin (MEVACOR) 40 mg tablet Take 1 Tablet by mouth every evening. 90 Tablet 3    insulin glargine U-300 conc (Toujeo SoloStar U-300 Insulin) 300 unit/mL (1.5 mL) inpn pen 35 Units by SubCUTAneous route nightly. 7.5 mL 3    Insulin Needles, Disposable, (BD Ultra-Fine Mini Pen Needle) 31 gauge x 3/16\" ndle Use twice daily \"E11.9\" 100 Pen Needle 5    Victoza 2-Denny 0.6 mg/0.1 mL (18 mg/3 mL) pnij ADMINISTER 1.2 MG UNDER THE SKIN DAILY (Patient taking differently: 0.6 mg.) 6 mL 5    Insulin Needles, Disposable, (BD Ultra-Fine Mini Pen Needle) 31 gauge x 3/16\" ndle USE AS DIRECTED 100 Pen Needle 5    glucose blood VI test strips (FreeStyle Lite Strips) strip Use 2 times a day 100 Strip 5    aspirin delayed-release 81 mg tablet Take 81 mg by mouth daily. wheat dextrin 3 gram/3.8 gram powd Take 1 Scoop by mouth daily. 1 g 6     No current facility-administered medications on file prior to visit. Health Maintenance Due   Topic    COVID-19 Vaccine (4 - Booster for Moderna series)    Flu Vaccine (1)    Foot Exam Q1        1. \"Have you been to the ER, urgent care clinic since your last visit? Hospitalized since your last visit? \" No    2. \"Have you seen or consulted any other health care providers outside of the [address] [place] [address] since your last visit? \" No 3. For patients aged 39-70: Has the patient had a colonoscopy / FIT/ Cologuard? YES      If the patient is female:    4. For patients aged 41-77: Has the patient had a mammogram within the past 2 years? NA - based on age or sex      11. For patients aged 21-65: Has the patient had a pap smear?  NA - based on age or sex

## 2022-11-01 NOTE — PROGRESS NOTES
FLULAVAL administered 11/1/2022 by Janelle Key LPN with guardian's consent. Patient tolerated procedure well. No reactions noted.

## 2022-11-01 NOTE — PROGRESS NOTES
Diabetic ROS - medication compliance: compliant all of the time, diabetic diet compliance: compliant most of the time, home glucose monitoring: is performed sporadically, fasting values range 120 up to 140, nonfasting values range 160, further diabetic ROS: no polyuria or polydipsia, no chest pain, dyspnea or TIA's, no numbness, tingling or pain in extremities, no unusual visual symptoms, no hypoglycemia, no medication side effects noted. New concerns: wants dietary help. [facility] weeks  Sugars under 200 takes insulin in morning  Left calf was sore for 4 days after injury on steps now better  Diabetic exam: heart sounds normal rate, regular rhythm, normal S1, S2, no murmurs, rubs, clicks or gallops, chest clear. Lab review: labs reviewed, I note that glycosylated hemoglobin abnormal still low dose victoza  Results for orders placed or performed in visit on 06/14/22   LIPID PANEL   Result Value Ref Range    Cholesterol, total 149 100 - 199 mg/dL    Triglyceride 68 0 - 149 mg/dL    HDL Cholesterol 57 >39 mg/dL    VLDL, calculated 14 5 - 40 mg/dL    LDL, calculated 78 0 - 99 mg/dL   METABOLIC PANEL, COMPREHENSIVE   Result Value Ref Range    Glucose 101 (H) 70 - 99 mg/dL    BUN 15 8 - 27 mg/dL    Creatinine 0.97 0.76 - 1.27 mg/dL    eGFR 88 >59 mL/min/1.73    BUN/Creatinine ratio 15 10 - 24    Sodium 143 134 - 144 mmol/L    Potassium 4.4 3.5 - 5.2 mmol/L    Chloride 102 96 - 106 mmol/L    CO2 24 20 - 29 mmol/L    Calcium 9.7 8.6 - 10.2 mg/dL    Protein, total 6.7 6.0 - 8.5 g/dL    Albumin 4.1 3.8 - 4.8 g/dL    GLOBULIN, TOTAL 2.6 1.5 - 4.5 g/dL    A-G Ratio 1.6 1.2 - 2.2    Bilirubin, total 0.3 0.0 - 1.2 mg/dL    Alk.  phosphatase 76 44 - 121 IU/L    AST (SGOT) 21 0 - 40 IU/L    ALT (SGPT) 18 0 - 44 IU/L   HEMOGLOBIN A1C WITH EAG   Result Value Ref Range    Hemoglobin A1c 8.9 (H) 4.8 - 5.6 %    Estimated average glucose 209 mg/dL   MICROALBUMIN, UR, RAND W/ MICROALB/CREAT RATIO   Result Value Ref Range    Creatinine, urine random 116.7 Not Estab. mg/dL    Microalbumin, urine 6.3 Not Estab. ug/mL    Microalb/Creat ratio (ug/mg creat.) 5 0 - 29 mg/g creat   CVD REPORT   Result Value Ref Range    INTERPRETATION Note    AMB POC HEMOGLOBIN A1C   Result Value Ref Range    Hemoglobin A1c (POC) 9.0 %     Lab Results   Component Value Date/Time    Hemoglobin A1c 8.9 (H) 10/27/2022 10:29 AM    Hemoglobin A1c (POC) 9.0 06/14/2022 08:45 AM     .   Vitals:    11/01/22 0916   BP: (!) 129/59   Pulse: 64   Resp: 18   Temp: 98.1 °F (36.7 °C)   TempSrc: Temporal   SpO2: 95%   Weight: 287 lb 6.4 oz (130.4 kg)   Height: 5' 6\" (1.676 m)     S1 and S2 normal, no murmurs, clicks, gallops or rubs. Regular rate and rhythm. Chest is clear; no wheezes or rales. No edema or JVD. Left calf normal    Diagnoses and all orders for this visit:    1. Type 2 diabetes mellitus with hyperglycemia, with long-term current use of insulin ([address]    2. Encounter for immunization  -     INFLUENZA, FLUARIX, FLULAVAL, FLUZONE (AGE 6 MO+), AFLURIA(AGE 3Y+) IM, PF, 0.5 ML    3. Morbid obesity with BMI of 45.0-49.9, adult ([address]    4. Dyslipidemia, goal LDL below 100    5. Essential hypertension    Other orders  -     atorvastatin (LIPITOR) 20 mg tablet; Take 1 Tablet by mouth daily.     Titrate to 1.2 victoza daily or every other day  Switch statin to more potent    Lipid abnormality controlled  Hypertension controlled for age based on guidelines  Fair diabetes

## 2022-11-29 ENCOUNTER — TELEPHONE (OUTPATIENT)
Dept: INTERNAL MEDICINE CLINIC | Age: 62
End: 2022-11-29

## 2022-11-29 ENCOUNTER — NURSE TRIAGE (OUTPATIENT)
Dept: OTHER | Facility: CLINIC | Age: 62
End: 2022-11-29

## 2022-11-29 ENCOUNTER — OFFICE VISIT (OUTPATIENT)
Dept: INTERNAL MEDICINE CLINIC | Age: 62
End: 2022-11-29
Payer: COMMERCIAL

## 2022-11-29 VITALS
OXYGEN SATURATION: 97 % | TEMPERATURE: 98.6 F | DIASTOLIC BLOOD PRESSURE: 60 MMHG | HEART RATE: 82 BPM | WEIGHT: 283.8 LBS | HEIGHT: 66 IN | RESPIRATION RATE: 18 BRPM | SYSTOLIC BLOOD PRESSURE: 124 MMHG | BODY MASS INDEX: 45.61 KG/M2

## 2022-11-29 DIAGNOSIS — E66.01 MORBID OBESITY WITH BMI OF 45.0-49.9, ADULT (HCC): ICD-10-CM

## 2022-11-29 DIAGNOSIS — Z79.4 TYPE 2 DIABETES MELLITUS WITH HYPERGLYCEMIA, WITH LONG-TERM CURRENT USE OF INSULIN (HCC): ICD-10-CM

## 2022-11-29 DIAGNOSIS — K62.5 RECTAL BLEEDING: Primary | ICD-10-CM

## 2022-11-29 DIAGNOSIS — G44.219 EPISODIC TENSION-TYPE HEADACHE, NOT INTRACTABLE: ICD-10-CM

## 2022-11-29 DIAGNOSIS — I10 ESSENTIAL HYPERTENSION: ICD-10-CM

## 2022-11-29 DIAGNOSIS — E11.65 TYPE 2 DIABETES MELLITUS WITH HYPERGLYCEMIA, WITH LONG-TERM CURRENT USE OF INSULIN (HCC): ICD-10-CM

## 2022-11-29 DIAGNOSIS — K62.5 RECTAL BLEEDING: ICD-10-CM

## 2022-11-29 PROCEDURE — 99214 OFFICE O/P EST MOD 30 MIN: CPT | Performed by: PHYSICIAN ASSISTANT

## 2022-11-29 NOTE — TELEPHONE ENCOUNTER
Location of patient: [place]    Received call from JOHNY at [facility] with [employer]. Subjective: Caller states \"blood in stool last week and seen in toilet bowl, and headache is ongoing\"     Current Symptoms: mild headache    Onset: 7 week ago; improving    Associated Symptoms: NA    Pain Severity: 1/10; mild; constant    Temperature: denies    What has been tried: advil and tylenol    LMP: NA Pregnant: NA    Recommended disposition: See in Office Today    Care advice provided, patient verbalizes understanding; denies any other questions or concerns; instructed to call back for any new or worsening symptoms. Patient/Caller agrees with recommended disposition; writer provided warm transfer to [employer] at [facility] for appointment scheduling    Attention Provider: Thank you for allowing me to participate in the care of your patient. The patient was connected to triage in response to information provided to the [facility]. Please do not respond through this encounter as the response is not directed to a shared pool.     Reminders:     Beginning 5/23/22 - document all [place] patients in [facility]; call origin: P    Call [address] [facility] [address] Provider/Office    Care Advice - both instruction and documentation    Routing - PCP     PLEASE DELETE ALL RED TEXT PRIOR TO SIGNING NOTE    Reason for Disposition   Unexplained headache that is present > 24 hours   Patient wants to be seen    Protocols used: Headache-ADULT-[place], Rectal Bleeding-ADULT-[place]

## 2022-11-29 NOTE — PROGRESS NOTES
Reviewed record in preparation for visit and have obtained necessary documentation. Identified pt with two pt identifiers(name and ). Chief Complaint   Patient presents with    Head Pain     Patient states 7 days ago he started to have headaches daily. He also states blood in stool for 7 days as well. Blood pressure (!) 141/61, pulse 82, temperature 98.6 °F (37 °C), temperature source Temporal, resp. rate 18, height 5' 6\" (1.676 m), weight 283 lb 12.8 oz (128.7 kg), SpO2 97 %. Health Maintenance Due   Topic Date Due    COVID-19 Vaccine (4 - Booster for Moderna series) 2021    Diabetic Foot Care  2022       Mr. Caleb Rizo has a reminder for a \"due or due soon\" health maintenance. I have asked that he discuss this further with his primary care provider for follow-up on this health maintenance. Coordination of Care Questionnaire:  :     1) Have you been to an emergency room, urgent care clinic since your last visit? no   Hospitalized since your last visit? no             2) Have you seen or consulted any other health care providers outside of [address] [place] [address] since your last visit? no      3) In the event something were to happen to you and you were unable to speak on your behalf, do you have an Advance Directive/ Living Will in place stating your wishes?  YES

## 2022-11-29 NOTE — PROGRESS NOTES
Rebeca Cid is a 58 y.o. male  Chief Complaint   Patient presents with    Head Pain     Patient states 7 days ago he started to have headaches daily. He also states blood in stool for 7 days as well. Visit Vitals  /60   Pulse 82   Temp 98.6 °F (37 °C) (Temporal)   Resp 18   Ht 5' 6\" (1.676 m)   Wt 283 lb 12.8 oz (128.7 kg)   SpO2 97%   BMI 45.81 kg/m²      Health Maintenance Due   Topic Date Due    COVID-19 Vaccine (4 - Booster for Moderna series) 12/21/2021    Foot Exam Q1  11/03/2022     HPI  Francis Flanagan MD's patient in to see me today for an acute visit.     7 days of off and on headache. Took Tylenol/Advil and this helped somewhat. BP controlled  Yesterday, no headache. 7 days of significant rectal bleeding with BM for 5 of those 7 days. Bright red toilet water. Stool is formed. No diarrhea. No hx hemorroids/rectal discomfort. BMs usually 1-2 times per day or 2. No change in frequency. Yesterday, no blood in stool. Last episode of blood in stool was this morning - less than previously. Last colonoscopy 2018. Polyps found. Due for recheck next year. DM II - has been working with DM ed. FBS today 119. No FBS over 126 x 7 days.   Hemoglobin A1c (POC)   Date Value Ref Range Status   06/14/2022 9.0 % Final      Lab Results   Component Value Date/Time    Hemoglobin A1c 8.9 (H) 10/27/2022 10:29 AM    Hemoglobin A1c 9.6 (H) 02/02/2022 12:00 AM    Hemoglobin A1c 9.0 (H) 05/03/2021 09:17 AM    Glucose 101 (H) 10/27/2022 10:29 AM    Glucose  11/25/2013 09:10 AM    Microalbumin/Creat ratio (mg/g creat) 6 05/08/2020 07:56 AM    Microalb/Creat ratio (ug/mg creat.) 5 10/27/2022 10:29 AM    Microalbumin,urine random 0.61 05/08/2020 07:56 AM    LDL, calculated 78 10/27/2022 10:29 AM    LDL, calculated 85.6 05/03/2021 09:17 AM    Creatinine 0.97 10/27/2022 10:29 AM     Currently taking:   Key Antihyperglycemic Medications               metFORMIN ER (GLUCOPHAGE XR) 500 mg tablet (Taking) TAKE 4 TABLETS BY MOUTH EVERY DAY AS DIRECTED    Jardiance 25 mg tablet (Taking) TAKE 1 TABLET BY MOUTH DAILY    insulin glargine U-300 conc (Toujeo SoloStar U-300 Insulin) 300 unit/mL (1.5 mL) inpn pen (Taking) 35 Units by SubCUTAneous route nightly. Victoza 2-Denny 0.6 mg/0.1 mL (18 mg/3 mL) pnij (Taking) ADMINISTER 1.2 MG UNDER THE SKIN DAILY          Lost weight over Thanksgiving! Wt Readings from Last 3 Encounters:   11/29/22 283 lb 12.8 oz (128.7 kg)   11/01/22 287 lb 6.4 oz (130.4 kg)   06/14/22 286 lb (129.7 kg)     ROS  Review of Systems   Constitutional:  Negative for fever and malaise/fatigue. HENT:  Negative for congestion. Eyes:  Negative for blurred vision, double vision, photophobia, pain, discharge and redness. Respiratory:  Negative for shortness of breath. Cardiovascular:  Negative for chest pain and palpitations. Gastrointestinal:  Positive for blood in stool. Negative for abdominal pain, constipation, diarrhea, heartburn, melena, nausea and vomiting. Musculoskeletal:  Negative for falls. Neurological:  Negative for dizziness, focal weakness, loss of consciousness and weakness. Psychiatric/Behavioral:  Negative for depression. The patient is not nervous/anxious. EXAM  Physical Exam  Vitals and nursing note reviewed. Constitutional:       General: He is not in acute distress. Appearance: He is well-developed. HENT:      Head: Normocephalic and atraumatic. Neck:      Vascular: No JVD. Cardiovascular:      Rate and Rhythm: Normal rate and regular rhythm. Heart sounds: Normal heart sounds. Pulmonary:      Effort: Pulmonary effort is normal. No respiratory distress. Breath sounds: Normal breath sounds. Abdominal:      General: Bowel sounds are normal. There is no distension. Palpations: Abdomen is soft. There is no mass. Tenderness: There is no abdominal tenderness. There is no guarding or rebound.    Genitourinary:     Rectum: Normal.   Musculoskeletal:      Cervical back: Neck supple. Right lower leg: No edema. Left lower leg: No edema. Skin:     General: Skin is warm and dry. Neurological:      Mental Status: He is alert and oriented to person, place, and time. Gait: Gait normal.   Psychiatric:         Behavior: Behavior normal.         Thought Content: Thought content normal.         Judgment: Judgment normal.     ASSESSMENT/PLAN  Encounter Diagnoses     ICD-10-CM ICD-9-CM   1. Rectal bleeding  K62.5 569.3   2. Essential hypertension  I10 401.9   3. Type 2 diabetes mellitus with hyperglycemia, with long-term current use of insulin ([facility])  E11.65 250.00    Z79.4 790.29     V58.67   4. Morbid obesity with BMI of 45.0-49.9, adult ([facility])  E66.01 278.01    Z68.42 V85.42   5. Episodic tension-type headache, not intractable  G44.219 339.11     Orders Placed This Encounter    IRON PROFILE    CBC WITH AUTOMATED DIFF    [facility]   Stretching exercise reviewed.

## 2022-11-30 LAB
BASOPHILS # BLD: 0.1 K/UL (ref 0–0.1)
BASOPHILS NFR BLD: 1 % (ref 0–1)
DIFFERENTIAL METHOD BLD: ABNORMAL
EOSINOPHIL # BLD: 0.1 K/UL (ref 0–0.4)
EOSINOPHIL NFR BLD: 2 % (ref 0–7)
ERYTHROCYTE [DISTWIDTH] IN BLOOD BY AUTOMATED COUNT: 14.6 % (ref 11.5–14.5)
HCT VFR BLD AUTO: 48.7 % (ref 36.6–50.3)
HGB BLD-MCNC: 15.1 G/DL (ref 12.1–17)
IMM GRANULOCYTES # BLD AUTO: 0 K/UL (ref 0–0.04)
IMM GRANULOCYTES NFR BLD AUTO: 0 % (ref 0–0.5)
IRON SATN MFR SERPL: 17 % (ref 20–50)
IRON SERPL-MCNC: 57 UG/DL (ref 35–150)
LYMPHOCYTES # BLD: 2.2 K/UL (ref 0.8–3.5)
LYMPHOCYTES NFR BLD: 24 % (ref 12–49)
MCH RBC QN AUTO: 27.1 PG (ref 26–34)
MCHC RBC AUTO-ENTMCNC: 31 G/DL (ref 30–36.5)
MCV RBC AUTO: 87.4 FL (ref 80–99)
MONOCYTES # BLD: 0.7 K/UL (ref 0–1)
MONOCYTES NFR BLD: 8 % (ref 5–13)
NEUTS SEG # BLD: 6 K/UL (ref 1.8–8)
NEUTS SEG NFR BLD: 65 % (ref 32–75)
NRBC # BLD: 0 K/UL (ref 0–0.01)
NRBC BLD-RTO: 0 PER 100 WBC
PLATELET # BLD AUTO: 298 K/UL (ref 150–400)
PMV BLD AUTO: 11.3 FL (ref 8.9–12.9)
RBC # BLD AUTO: 5.57 M/UL (ref 4.1–5.7)
TIBC SERPL-MCNC: 332 UG/DL (ref 250–450)
WBC # BLD AUTO: 9.1 K/UL (ref 4.1–11.1)

## 2022-11-30 NOTE — PROGRESS NOTES
Blood count and iron levels are healthy. Good! Having problems with her medication, would like to see , soonest is March 4th.   Please give her a call back

## 2022-11-30 NOTE — TELEPHONE ENCOUNTER
Their office didn't have referral in the system for him. Will fax it back to them stat so he can be seen sooner.

## 2022-12-01 NOTE — TELEPHONE ENCOUNTER
Spoke with pt advised referral sent to GI. If sooner appt can be made with specialist he can try to set up otherwise adv to go to ED if bleeding continues.

## 2023-01-04 RX ORDER — EMPAGLIFLOZIN 25 MG/1
TABLET, FILM COATED ORAL
Qty: 30 TABLET | Refills: 5 | Status: SHIPPED | OUTPATIENT
Start: 2023-01-04

## 2023-01-04 RX ORDER — LOSARTAN POTASSIUM AND HYDROCHLOROTHIAZIDE 25; 100 MG/1; MG/1
TABLET ORAL
Qty: 30 TABLET | Refills: 5 | Status: SHIPPED | OUTPATIENT
Start: 2023-01-04

## 2023-01-06 RX ORDER — PEN NEEDLE, DIABETIC 31 GX3/16"
NEEDLE, DISPOSABLE MISCELLANEOUS
Qty: 100 PEN NEEDLE | Refills: 5 | Status: SHIPPED | OUTPATIENT
Start: 2023-01-06

## 2023-02-01 ENCOUNTER — OFFICE VISIT (OUTPATIENT)
Dept: INTERNAL MEDICINE CLINIC | Age: 63
End: 2023-02-01
Payer: COMMERCIAL

## 2023-02-01 VITALS
DIASTOLIC BLOOD PRESSURE: 55 MMHG | BODY MASS INDEX: 44.81 KG/M2 | SYSTOLIC BLOOD PRESSURE: 112 MMHG | RESPIRATION RATE: 20 BRPM | WEIGHT: 278.8 LBS | HEIGHT: 66 IN | HEART RATE: 59 BPM | OXYGEN SATURATION: 97 % | TEMPERATURE: 98.1 F

## 2023-02-01 DIAGNOSIS — Z79.4 TYPE 2 DIABETES MELLITUS WITH HYPERGLYCEMIA, WITH LONG-TERM CURRENT USE OF INSULIN (HCC): ICD-10-CM

## 2023-02-01 DIAGNOSIS — E66.01 MORBID OBESITY (HCC): ICD-10-CM

## 2023-02-01 DIAGNOSIS — M25.511 ACUTE PAIN OF RIGHT SHOULDER: Primary | ICD-10-CM

## 2023-02-01 DIAGNOSIS — E11.65 TYPE 2 DIABETES MELLITUS WITH HYPERGLYCEMIA, WITH LONG-TERM CURRENT USE OF INSULIN (HCC): ICD-10-CM

## 2023-02-01 LAB — HBA1C MFR BLD HPLC: 7.8 %

## 2023-02-01 PROCEDURE — 99213 OFFICE O/P EST LOW 20 MIN: CPT | Performed by: INTERNAL MEDICINE

## 2023-02-01 PROCEDURE — 83036 HEMOGLOBIN GLYCOSYLATED A1C: CPT | Performed by: INTERNAL MEDICINE

## 2023-02-01 NOTE — PROGRESS NOTES
Miguel Win is a 58 y.o. male  Chief Complaint   Patient presents with    Shoulder Pain     Patient c/o right shoulder pain x 3 weeks. No medications taken for the pain     Visit Vitals  BP (!) 112/55 (BP 1 Location: Right upper arm, BP Patient Position: Sitting, BP Cuff Size: Adult)   Pulse (!) 59   Temp 98.1 °F (36.7 °C) (Temporal)   Resp 20   Ht 5' 6\" (1.676 m)   Wt 278 lb 12.8 oz (126.5 kg)   SpO2 97%   BMI 45.00 kg/m²          HPI  Carina Saucedo presents to clinic due to right shoulder pain and stiffness of 1 month duration. The pain is mild and it is trigger when her raises his arms above shoulder level, he denies trauma, numbness or tignling sensation. Patient also wants to follow up on his diabetes. Patient has been trying a program called [employer] program and he has seen a result with weight loss and wants to see if that is ok given he is diabetic. The diet he brought looks low carbohydrate but the total calories may not be low. I will give him nutrition referral to help with that. He denies numbness or tingling sensation. Foot exam is normal today. HTN is in good control, blood sugar fasting runs around 100, random glucose around 140/160. POC a1c is 7.8 today, continue current course. Since he has started intensive exercise and weight loss program, no change advised, encouraged. .  Past Medical History:   Diagnosis Date    Diabetes ([facility]     Hypercholesterolemia     Hypertension     Morbid obesity ([facility]     Proteinuria             ROS  Review of Systems   All other systems reviewed and are negative. EXAM  Physical Exam  Vitals reviewed. Constitutional:       Appearance: Normal appearance. Cardiovascular:      Rate and Rhythm: Normal rate and regular rhythm. Pulses: Normal pulses. Heart sounds: Normal heart sounds. No murmur heard. Pulmonary:      Effort: Pulmonary effort is normal. No respiratory distress. Breath sounds: Normal breath sounds. Musculoskeletal:      Right lower leg: No edema. Left lower leg: No edema. Neurological:      Mental Status: He is alert. Psychiatric:         Mood and Affect: Mood normal.         Thought Content: Thought content normal.     .Diabetic foot exam:     Left Foot:   Visual Exam: normal    Pulse DP: 2+ (normal)   Filament test: normal sensation    Vibratory sensation: normal      Right Foot:   Visual Exam: normal    Pulse DP: 2+ (normal)   Filament test: normal sensation    Vibratory sensation: normal    Health Maintenance Due   Topic Date Due    COVID-19 Vaccine (4 - Booster for Moderna series) 12/21/2021       ASSESSMENT/PLAN    Diagnoses and all orders for this visit:    1. Acute pain of right shoulder  Comments:  appear shoulder impingement  heat and cold press, tylenol prn   Orders:  -     XR SHOULDER RT AP/LAT MIN 2 V; Future    2. Morbid obesity ([address]  -     REFERRAL TO NUTRITION    3.  Type 2 diabetes mellitus with hyperglycemia, with long-term current use of insulin ([facility])  -     REFERRAL TO NUTRITION  -     AMB POC HEMOGLOBIN A1C        Remberto Padilla MD

## 2023-02-01 NOTE — PROGRESS NOTES
Room 8     Identified pt with two pt identifiers(name and ). Reviewed record in preparation for visit and have obtained necessary documentation. All patient medications has been reviewed. Chief Complaint   Patient presents with    Shoulder Pain     Patient c/o right shoulder pain x 3 weeks. No medications taken for the pain       3 most recent PHQ Screens 2023   Little interest or pleasure in doing things Not at all   Feeling down, depressed, irritable, or hopeless Not at all   Total Score PHQ 2 0   Trouble falling or staying asleep, or sleeping too much -   Feeling tired or having little energy -   Poor appetite, weight loss, or overeating -   Feeling bad about yourself - or that you are a failure or have let yourself or your family down -   Trouble concentrating on things such as school, work, reading, or watching TV -   Moving or speaking so slowly that other people could have noticed; or the opposite being so fidgety that others notice -   Thoughts of being better off dead, or hurting yourself in some way -   PHQ 9 Score -   How difficult have these problems made it for you to do your work, take care of your home and get along with others -     Abuse Screening Questionnaire 10/4/2021   Do you ever feel afraid of your partner? N   Are you in a relationship with someone who physically or mentally threatens you? N   Is it safe for you to go home? Y       Health Maintenance Due   Topic    COVID-19 Vaccine (4 - Booster for Moderna series)    Foot Exam Q1      Health Maintenance Review: Patient reminded of \"due or due soon\" health maintenance. I have asked the patient to contact his/her primary care provider (PCP) for follow-up on his/her health maintenance.     Vitals:    23 1519   BP: (!) 112/55   Pulse: (!) 59   Resp: 20   Temp: 98.1 °F (36.7 °C)   TempSrc: Temporal   SpO2: 97%   Weight: 278 lb 12.8 oz (126.5 kg)   Height: 5' 6\" (1.676 m)   PainSc:   5   PainLoc: Shoulder       Wt Readings from Last 3 Encounters:   02/01/23 278 lb 12.8 oz (126.5 kg)   11/29/22 283 lb 12.8 oz (128.7 kg)   11/01/22 287 lb 6.4 oz (130.4 kg)     Temp Readings from Last 3 Encounters:   02/01/23 98.1 °F (36.7 °C) (Temporal)   11/29/22 98.6 °F (37 °C) (Temporal)   11/01/22 98.1 °F (36.7 °C) (Temporal)     BP Readings from Last 3 Encounters:   02/01/23 (!) 112/55   11/29/22 124/60   11/01/22 (!) 129/59     Pulse Readings from Last 3 Encounters:   02/01/23 (!) 59   11/29/22 82   11/01/22 64       1. \"Have you been to the ER, urgent care clinic since your last visit? Hospitalized since your last visit? \" Yes When: 3 weeks ago Where: [facility] [place]. Reason for visit: Due to COVID exposure      2. \"Have you seen or consulted any other health care providers outside of the [address] [place] [address] since your last visit? \" Seen by Podiatry on 1/23/23 and also seen by [facility] on 12/9/23 and 1/9/23    3. For patients aged 39-70: Has the patient had a colonoscopy / FIT/ Cologuard? Yes - Care Gap present. Most recent result on file last completed 2/26/18            Patient is accompanied by self I have received verbal consent from Yarelis Barnes to discuss any/all medical information while they are present in the room.

## 2023-02-02 ENCOUNTER — HOSPITAL ENCOUNTER (EMERGENCY)
Age: 63
Discharge: STILL ADMITTED OR ADMINISTRATIVE | End: 2023-02-02

## 2023-02-02 ENCOUNTER — HOSPITAL ENCOUNTER (OUTPATIENT)
Dept: GENERAL RADIOLOGY | Age: 63
Discharge: HOME | End: 2023-02-02
Payer: COMMERCIAL

## 2023-02-02 DIAGNOSIS — M25.511 ACUTE PAIN OF RIGHT SHOULDER: ICD-10-CM

## 2023-02-02 PROCEDURE — 73030 X-RAY EXAM OF SHOULDER: CPT

## 2023-02-03 RX ORDER — LIRAGLUTIDE 6 MG/ML
INJECTION SUBCUTANEOUS
Qty: 6 ML | Refills: 5 | Status: SHIPPED | OUTPATIENT
Start: 2023-02-03

## 2023-02-07 ENCOUNTER — APPOINTMENT (OUTPATIENT)
Dept: NUTRITION | Age: 63
End: 2023-02-07

## 2023-02-08 ENCOUNTER — PATIENT MESSAGE (OUTPATIENT)
Dept: INTERNAL MEDICINE CLINIC | Age: 63
End: 2023-02-08

## 2023-02-08 DIAGNOSIS — M25.511 ACUTE PAIN OF RIGHT SHOULDER: Primary | ICD-10-CM

## 2023-02-09 NOTE — TELEPHONE ENCOUNTER
From: Steven Leavitt  To: Sheree Bales MD  Sent: 2/8/2023 11:55 AM EST  Subject: [facility]    I saw the message to [facility] and when I called, they stated I would have to be seen by a [facility] Doctor.  They said I would need a referral. How do I obtain a referral?    Thanks    Laura Little  [phone]

## 2023-02-23 ENCOUNTER — HOSPITAL ENCOUNTER (OUTPATIENT)
Dept: NUTRITION | Age: 63
Discharge: HOME | End: 2023-02-23
Payer: COMMERCIAL

## 2023-02-23 PROCEDURE — 97802 MEDICAL NUTRITION INDIV IN: CPT | Performed by: DIETITIAN, REGISTERED

## 2023-02-23 NOTE — PROGRESS NOTES
[address] [facility]     Nutrition Assessment - Medical Nutrition Therapy   Outpatient Initial Evaluation         Patient Name: Aroldo Ibrahim : 1960   Treatment Diagnosis: E66.01 (ICD-10-CM) - Morbid obesity ([address]   E11.65,Z79.4 (ICD-10-CM) - Type 2 diabetes mellitus with hyperglycemia, with long-term current use of insulin [facility])      Referral Source: Teetee Garcia MD Start of Care [facility], [place]): 2023     In time:   11:30am             Out time:   12:30pm   Total Treatment Time (min):   60     Gender: male Age: 58 y.o. Ht: 66 in Wt:  282.4 lb  kg   BMI: 45.6 BMR   Male  BMR Female      Past Medical History:  Patient Active Problem List   Diagnosis Code    Hypertension I10    Morbid obesity ([address] E66.01    Diabetes ([facility] E11.9    Proteinuria R80.9    Other and unspecified disc disorder of lumbar region M51.9    Type II diabetes mellitus, uncontrolled [id number]    Dyslipidemia, goal LDL below 100 E78.5    Primary osteoarthritis of one knee M17.10    PAC (premature atrial contraction) I49.1        Pertinent Medications:     Current Outpatient Medications:     Victoza 2-Denny 0.6 mg/0.1 mL (18 mg/3 mL) pnij, ADMINISTER 1.2 MG UNDER THE SKIN DAILY, Disp: 6 mL, Rfl: 5    Insulin Needles, Disposable, (BD Ultra-Fine Mini Pen Needle) 31 gauge x 3/16\" ndle, USE TWICE DAILY, Disp: 100 Pen Needle, Rfl: 5    Jardiance 25 mg tablet, TAKE 1 TABLET BY MOUTH DAILY, Disp: 30 Tablet, Rfl: 5    losartan-hydroCHLOROthiazide (HYZAAR) 100-25 mg per tablet, TAKE 1 TABLET BY MOUTH EVERY DAY, Disp: 30 Tablet, Rfl: 5    atorvastatin (LIPITOR) 20 mg tablet, Take 1 Tablet by mouth daily. , Disp: 90 Tablet, Rfl: 3    metFORMIN ER (GLUCOPHAGE XR) 500 mg tablet, TAKE 4 TABLETS BY MOUTH EVERY DAY AS DIRECTED, Disp: 120 Tablet, Rfl: 5    insulin glargine U-300 conc (Toujeo SoloStar U-300 Insulin) 300 unit/mL (1.5 mL) inpn pen, 35 Units by SubCUTAneous route nightly., Disp: 7.5 mL, Rfl: 3    Insulin Needles, Disposable, (BD Ultra-Fine Mini Pen Needle) 31 gauge x 3/16\" ndle, USE AS DIRECTED, Disp: 100 Pen Needle, Rfl: 5    glucose blood VI test strips (FreeStyle Lite Strips) strip, Use 2 times a day, Disp: 100 Strip, Rfl: 5    aspirin delayed-release 81 mg tablet, Take 81 mg by mouth daily. , Disp: , Rfl:     wheat dextrin 3 gram/3.8 gram powd, Take 1 Scoop by mouth daily. , Disp: 1 g, Rfl: 6    Started benefitber due to changes in bowel patterns     Biochemical Data:   Lab Results   Component Value Date/Time    Hemoglobin A1c 8.9 (H) 10/27/2022 10:29 AM    Hemoglobin A1c (POC) 7.8 02/01/2023 03:55 PM     Lab Results   Component Value Date/Time    Sodium 143 10/27/2022 10:29 AM    Potassium 4.4 10/27/2022 10:29 AM    Chloride 102 10/27/2022 10:29 AM    CO2 24 10/27/2022 10:29 AM    Anion gap 8 06/26/2021 11:55 AM    Glucose 101 (H) 10/27/2022 10:29 AM    BUN 15 10/27/2022 10:29 AM    Creatinine 0.97 10/27/2022 10:29 AM    BUN/Creatinine ratio 15 10/27/2022 10:29 AM    GFR est  02/02/2022 12:00 AM    GFR est non-AA 96 02/02/2022 12:00 AM    Calcium 9.7 10/27/2022 10:29 AM    Bilirubin, total 0.3 10/27/2022 10:29 AM    Alk. phosphatase 76 10/27/2022 10:29 AM    Protein, total 6.7 10/27/2022 10:29 AM    Albumin 4.1 10/27/2022 10:29 AM    Globulin 3.9 06/26/2021 11:55 AM    A-G Ratio 1.6 10/27/2022 10:29 AM    ALT (SGPT) 18 10/27/2022 10:29 AM    AST (SGOT) 21 10/27/2022 10:29 AM     Lab Results   Component Value Date/Time    Cholesterol, total 149 10/27/2022 10:29 AM    HDL Cholesterol 57 10/27/2022 10:29 AM    LDL, calculated 78 10/27/2022 10:29 AM    LDL, calculated 85.6 05/03/2021 09:17 AM    VLDL, calculated 14 10/27/2022 10:29 AM    VLDL, calculated 12.4 05/03/2021 09:17 AM    Triglyceride 68 10/27/2022 10:29 AM    CHOL/HDL Ratio 2.5 05/03/2021 09:17 AM        Assessment:   Trying to be healthier. Has lost and gained. Has started a new program (Fit after 50). On and off plan since being sick.  Has concerns about whether it is ok with his diabetes. Has had diabetes for >30 years. Has gone thorugh diabetes education before. Checks smbg most days. Since making change has seen SMBG fasted = low 90s. Highest 110mg/dl  No low blood sugars <70mg/dl. Today in office with 4 cough drops 198mg/dl. Activity: walking (30-40min daily) and at gym 4 days per week and strength training too. Starting weight of 287 lb  Now down 7 lb on home scale. 340 lb was his highest.     If seeing results will continue with a plan. If not will     Since starting diet changes noticed less consistent bowel movements. Now every other day. Sometimes difficult to go. Food & Nutrition: Intermittent fasting  has felt fine with doing this. Notes meal plan is expecnsive. 3-4 meals per day  Not eating pat 7:30pm    Currently low in soluble fiber. Meal 1 = 8oz ground beef, spinach salad with other veg and olive oil and water  Meal 2\" 3 eggs, 2 slices lopez, water  Meal 3: 4oz salmon+ either 1/2 sweet potato OR 1 cup asparagus and 1 cup peppers and water  Meal 4: 4oz cottage cheese with berries. On medium carb days will add in berries and 1/2 cup rice, and additional sweet potato. Max 30g carb from a sweet potato. Lower than plan is written for high carb day. Favorite foods: chicken, fish (not biggest fan of salmon), little beef, pork tenderloin, salads, potatoes, rice, chinese foods, [place], sweets ( chocolate cake. Doesn't matter portion), [place] foods and being able to eat out.    Asparagus,     Dislikes: brussels, certain squash, tofu, peanuts         Estimate Needs:    Equation( [] MSJ ; []  HBE; [] Kumar Landrum; [] other)  * Activity Factor (1.2-1.3) -750   Calories:  1800 Protein: 113-140 Carbs: 180-200 Fat: 60-70   Kcal/day  g/day  g/day  g/day        percent: 25-30  40-45  30-35               Nutrition Diagnosis Food and nutrition related knowledge deficit R/T lack of prior education for healthy weight loss with diabetes AEB Request for counseling. Nutrition Intervention &  Education: Educated on dietary patterns to support weight loss long term. Discussed sustainable changes and dietary preferences. Set protein and carb goals based on recent changes, bowel patterns and blood sugars. Recommended adding in more complex carbohydrates with fiber to support bowl patterns. Discussed hunger/fullness and meal timing. Handouts Provided: [x]  Carbohydrates  []  Protein  [x]  Non-starchy Vegatbles  []  Food Label  []  Meal and Snack Ideas  []  Food Journals []  Diabetes  []  Cholesterol  []  Sodium  []  Gen Nutr Guidelines  []  SBGM Guidelines  [x]  Others: balanced plate   Information Reviewed with: pt   Readiness to Change Stage: []  Pre-contemplative    []  Contemplative  []  Preparation               [x]  Action                  []  Maintenance   Potential Barriers to Learning: []  Decline in memory    []  Language barrier   []  Other:  []  Emotional                  []  Limited mobility     [x]  None  []  Lack of motivation     [] Vision, hearing or cognitive impairment   Expected Compliance: Good     Nutritional Goal - To promote lifestyle changes to result in:    [x]  Weight loss  [x]  Improved diabetic control  []  Decreased cholesterol levels  []  Decreased blood pressure  []  Weight maintenance []  Preventing any interactions associated with food allergies  []  Adequate weight gain toward goal weight  []  Other:        Patient Goals:   - Improve consistency of CHO intake w/goal to plan meals with 1 cup max high fiber CHO/meal  for 3-4 meals per day. - continue increased activity with walking and gym 3-4 times per week. Increase as able.         Dietitian Signature: Korey Powell MS, ASUNCION, CSSD Date: 2/23/2023   Follow-up: 3-4 weeks Time: 11:36 AM

## 2023-03-06 RX ORDER — METFORMIN HYDROCHLORIDE 500 MG/1
TABLET, EXTENDED RELEASE ORAL
Qty: 120 TABLET | Refills: 5 | Status: SHIPPED | OUTPATIENT
Start: 2023-03-06

## 2023-03-15 ENCOUNTER — TELEPHONE (OUTPATIENT)
Dept: INTERNAL MEDICINE CLINIC | Age: 63
End: 2023-03-15

## 2023-03-16 ENCOUNTER — HOSPITAL ENCOUNTER (OUTPATIENT)
Dept: NUTRITION | Age: 63
End: 2023-03-16
Payer: COMMERCIAL

## 2023-03-23 ENCOUNTER — HOSPITAL ENCOUNTER (OUTPATIENT)
Dept: NUTRITION | Age: 63
Discharge: HOME | End: 2023-03-23
Payer: COMMERCIAL

## 2023-03-23 PROCEDURE — 97803 MED NUTRITION INDIV SUBSEQ: CPT | Performed by: DIETITIAN, REGISTERED

## 2023-03-23 NOTE — PROGRESS NOTES
NUTRITION - FOLLOW-UP TREATMENT NOTE  Patient Name: Sybil Pringle         Date: 3/23/2023  : 1960    YES Patient  Verified  Diagnosis:   E66.01 (ICD-10-CM) - Morbid obesity ([address]   E11.65,Z79.4 (ICD-10-CM) - Type 2 diabetes mellitus with hyperglycemia, with long-term current use of insulin ([facility])      In time:   11:30am             Out time:   12:00pm   Total Treatment Time (min):   30     SUBJECTIVE/ASSESSMENT  Current Wt: 272.4 Previous Wt: 278 (PCP) Wt Change: -4.6     Initial Wt: 272.4 Total Wt change: -4.6 Height: 66     Changes in medication or medical history? Any new allergies, surgeries or procedures? No    If yes, update Summary List   Current Outpatient Medications   Medication Instructions    aspirin delayed-release 81 mg, Oral, DAILY    atorvastatin (LIPITOR) 20 mg, Oral, DAILY    glucose blood VI test strips (FreeStyle Lite Strips) strip Use 2 times a day    Insulin Needles, Disposable, (BD Ultra-Fine Mini Pen Needle) 31 gauge x 3/16\" ndle USE AS DIRECTED    Insulin Needles, Disposable, (BD Ultra-Fine Mini Pen Needle) 31 gauge x 3/16\" ndle USE TWICE DAILY    Jardiance 25 mg tablet TAKE 1 TABLET BY MOUTH DAILY    losartan-hydroCHLOROthiazide (HYZAAR) 100-25 mg per tablet TAKE 1 TABLET BY MOUTH EVERY DAY    metFORMIN ER (GLUCOPHAGE XR) 500 mg tablet TAKE 4 TABLETS BY MOUTH EVERY DAY AS DIRECTED    Toujeo SoloStar U-300 Insulin 35 Units, SubCUTAneous, EVERY BEDTIME    Victoza 2-Denny 0.6 mg/0.1 mL (18 mg/3 mL) pnij ADMINISTER 1.2 MG UNDER THE SKIN DAILY    wheat dextrin 3 gram/3.8 gram powd 1 Scoop, Oral, DAILY              Nutrition Diagnosis        Diagnosis Status: New: Food and nutrition related knowledge deficit R./T lack of prior education for treating low blood sugars AEB report of consuming peanut butter for low blood sugar last week. Food and nutrition related knowledge deficit R/T lack of prior education for healthy weight loss with diabetes AEB Request for counseling.    [x] Improved []  No Change    []  Declined   []  Discontinued        Nutrition Monitoring and Evaluation: Exercise: walking 4 days per week. No step counter. Set one up on phone today. Some lower blood sugars. 14 day average is 105mg/dl. Notes 1 day this week with 70mg/dl blood sugar. Ate pb and an apple to address. Did not re check. Continues shorter window of eating. Made adjustments to include some whole grains, starchy vegetables, etc. Notes current diet and changes are sustainable and was able to continue dietary changes while on vacation last week. Previous goals:  Met. - Improve consistency of CHO intake w/goal to plan meals with 1 cup max high fiber CHO/meal  for 3-4 meals per day. Met. - continue increased activity with walking and gym 3-4 times per week. Increase as able. Nutrition Prescription and  Intervention Educated on teratling low blood sugars with 15/ 15 rule  Reviewed behavior changes . Provided support for changes made. MI, goal setting. Patient Education:  []  Review current plan with patient   []  Other:    Handouts/  Information Provided: []  Carbohydrates  []  Protein  []  Fiber  []  Serving Sizes  []  Fluids  []  General guidelines []  Diabetes  []  Cholesterol  []  Sodium  []  SBGM  []  Food Journals  []  Others:      Patient Goals -continue changes made: 1 cup max per meal of high fiber carbohydrate sources at 3-5 meals per day  -continue walking 4 days per week. Set up step tracker today  -treat low blood sugars <70mg/dl with 15/15 rule.         PLAN  [x]  Continue on current plan []  Follow-up PRN   []  Discharge due to :    [x]  Next appt: 1 month     Dietitian: Cristina Perez MS, RD, CSSD    Date: 3/23/2023 Time: 11:39 AM

## 2023-05-04 ENCOUNTER — OFFICE VISIT (OUTPATIENT)
Dept: INTERNAL MEDICINE CLINIC | Age: 63
End: 2023-05-04
Payer: COMMERCIAL

## 2023-05-04 VITALS
RESPIRATION RATE: 16 BRPM | DIASTOLIC BLOOD PRESSURE: 47 MMHG | WEIGHT: 272.6 LBS | HEIGHT: 66 IN | TEMPERATURE: 98.3 F | BODY MASS INDEX: 43.81 KG/M2 | OXYGEN SATURATION: 95 % | HEART RATE: 58 BPM | SYSTOLIC BLOOD PRESSURE: 114 MMHG

## 2023-05-04 DIAGNOSIS — M12.811 ROTATOR CUFF ARTHROPATHY, RIGHT: Primary | ICD-10-CM

## 2023-05-04 DIAGNOSIS — Z79.4 TYPE 2 DIABETES MELLITUS WITH HYPERGLYCEMIA, WITH LONG-TERM CURRENT USE OF INSULIN (HCC): ICD-10-CM

## 2023-05-04 DIAGNOSIS — E11.65 TYPE 2 DIABETES MELLITUS WITH HYPERGLYCEMIA, WITH LONG-TERM CURRENT USE OF INSULIN (HCC): ICD-10-CM

## 2023-05-04 PROCEDURE — 99213 OFFICE O/P EST LOW 20 MIN: CPT | Performed by: INTERNAL MEDICINE

## 2023-05-04 RX ORDER — INSULIN PUMP SYRINGE, 3 ML
EACH MISCELLANEOUS
Qty: 1 KIT | Refills: 0 | Status: SHIPPED | OUTPATIENT
Start: 2023-05-04

## 2023-05-05 ENCOUNTER — HOSPITAL ENCOUNTER (OUTPATIENT)
Dept: NUTRITION | Age: 63
Discharge: HOME | End: 2023-05-05
Payer: COMMERCIAL

## 2023-05-05 LAB
ALBUMIN SERPL-MCNC: 3.6 G/DL (ref 3.5–5)
ALBUMIN/GLOB SERPL: 1.1 (ref 1.1–2.2)
ALP SERPL-CCNC: 72 U/L (ref 45–117)
ALT SERPL-CCNC: 26 U/L (ref 12–78)
ANION GAP SERPL CALC-SCNC: 7 MMOL/L (ref 5–15)
AST SERPL-CCNC: 22 U/L (ref 15–37)
BASOPHILS # BLD: 0 K/UL (ref 0–0.1)
BASOPHILS NFR BLD: 0 % (ref 0–1)
BILIRUB SERPL-MCNC: 0.3 MG/DL (ref 0.2–1)
BUN SERPL-MCNC: 17 MG/DL (ref 6–20)
BUN/CREAT SERPL: 21 (ref 12–20)
CALCIUM SERPL-MCNC: 9.5 MG/DL (ref 8.5–10.1)
CHLORIDE SERPL-SCNC: 102 MMOL/L (ref 97–108)
CO2 SERPL-SCNC: 25 MMOL/L (ref 21–32)
CREAT SERPL-MCNC: 0.82 MG/DL (ref 0.7–1.3)
DIFFERENTIAL METHOD BLD: ABNORMAL
EOSINOPHIL # BLD: 0.1 K/UL (ref 0–0.4)
EOSINOPHIL NFR BLD: 1 % (ref 0–7)
ERYTHROCYTE [DISTWIDTH] IN BLOOD BY AUTOMATED COUNT: 15.7 % (ref 11.5–14.5)
EST. AVERAGE GLUCOSE BLD GHB EST-MCNC: 157 MG/DL
GLOBULIN SER CALC-MCNC: 3.4 G/DL (ref 2–4)
GLUCOSE SERPL-MCNC: 85 MG/DL (ref 65–100)
HBA1C MFR BLD: 7.1 % (ref 4–5.6)
HCT VFR BLD AUTO: 45.5 % (ref 36.6–50.3)
HGB BLD-MCNC: 14.1 G/DL (ref 12.1–17)
IMM GRANULOCYTES # BLD AUTO: 0 K/UL (ref 0–0.04)
IMM GRANULOCYTES NFR BLD AUTO: 0 % (ref 0–0.5)
LYMPHOCYTES # BLD: 2.8 K/UL (ref 0.8–3.5)
LYMPHOCYTES NFR BLD: 31 % (ref 12–49)
MCH RBC QN AUTO: 26.5 PG (ref 26–34)
MCHC RBC AUTO-ENTMCNC: 31 G/DL (ref 30–36.5)
MCV RBC AUTO: 85.4 FL (ref 80–99)
MONOCYTES # BLD: 0.9 K/UL (ref 0–1)
MONOCYTES NFR BLD: 10 % (ref 5–13)
NEUTS SEG # BLD: 5.2 K/UL (ref 1.8–8)
NEUTS SEG NFR BLD: 58 % (ref 32–75)
NRBC # BLD: 0 K/UL (ref 0–0.01)
NRBC BLD-RTO: 0 PER 100 WBC
PLATELET # BLD AUTO: 278 K/UL (ref 150–400)
PMV BLD AUTO: 11.4 FL (ref 8.9–12.9)
POTASSIUM SERPL-SCNC: 3.7 MMOL/L (ref 3.5–5.1)
PROT SERPL-MCNC: 7 G/DL (ref 6.4–8.2)
RBC # BLD AUTO: 5.33 M/UL (ref 4.1–5.7)
SODIUM SERPL-SCNC: 134 MMOL/L (ref 136–145)
WBC # BLD AUTO: 9.1 K/UL (ref 4.1–11.1)

## 2023-05-05 PROCEDURE — 97803 MED NUTRITION INDIV SUBSEQ: CPT | Performed by: DIETITIAN, REGISTERED

## 2023-05-12 ENCOUNTER — TRANSCRIBE ORDERS (OUTPATIENT)
Facility: HOSPITAL | Age: 63
End: 2023-05-12

## 2023-05-12 DIAGNOSIS — M75.102 ROTATOR CUFF TEAR ARTHROPATHY OF BOTH SHOULDERS: Primary | ICD-10-CM

## 2023-05-12 DIAGNOSIS — M12.812 ROTATOR CUFF TEAR ARTHROPATHY OF BOTH SHOULDERS: Primary | ICD-10-CM

## 2023-05-12 DIAGNOSIS — M12.811 ROTATOR CUFF TEAR ARTHROPATHY OF BOTH SHOULDERS: Primary | ICD-10-CM

## 2023-05-12 DIAGNOSIS — M75.101 ROTATOR CUFF TEAR ARTHROPATHY OF BOTH SHOULDERS: Primary | ICD-10-CM

## 2023-05-23 ENCOUNTER — HOSPITAL ENCOUNTER (OUTPATIENT)
Facility: HOSPITAL | Age: 63
Discharge: HOME | End: 2023-05-26
Payer: COMMERCIAL

## 2023-05-23 DIAGNOSIS — M75.102 ROTATOR CUFF TEAR ARTHROPATHY OF BOTH SHOULDERS: ICD-10-CM

## 2023-05-23 DIAGNOSIS — M12.812 ROTATOR CUFF TEAR ARTHROPATHY OF BOTH SHOULDERS: ICD-10-CM

## 2023-05-23 DIAGNOSIS — M75.101 ROTATOR CUFF TEAR ARTHROPATHY OF BOTH SHOULDERS: ICD-10-CM

## 2023-05-23 DIAGNOSIS — M12.811 ROTATOR CUFF TEAR ARTHROPATHY OF BOTH SHOULDERS: ICD-10-CM

## 2023-05-23 PROCEDURE — 73221 MRI JOINT UPR EXTREM W/O DYE: CPT

## 2023-05-26 ENCOUNTER — HOSPITAL ENCOUNTER (OUTPATIENT)
Facility: HOSPITAL | Age: 63
Setting detail: RECURRING SERIES
Discharge: HOME | End: 2023-05-29
Payer: COMMERCIAL

## 2023-05-26 PROCEDURE — 97803 MED NUTRITION INDIV SUBSEQ: CPT | Performed by: DIETITIAN, REGISTERED

## 2023-06-12 ENCOUNTER — PATIENT MESSAGE (OUTPATIENT)
Age: 63
End: 2023-06-12

## 2023-06-12 DIAGNOSIS — Z79.4 TYPE 2 DIABETES MELLITUS WITH HYPERGLYCEMIA, WITH LONG-TERM CURRENT USE OF INSULIN (HCC): Primary | ICD-10-CM

## 2023-06-12 DIAGNOSIS — E11.65 TYPE 2 DIABETES MELLITUS WITH HYPERGLYCEMIA, WITH LONG-TERM CURRENT USE OF INSULIN (HCC): Primary | ICD-10-CM

## 2023-06-12 RX ORDER — BLOOD-GLUCOSE METER
KIT MISCELLANEOUS
OUTPATIENT
Start: 2023-06-12

## 2023-06-16 RX ORDER — BLOOD-GLUCOSE METER
KIT MISCELLANEOUS
COMMUNITY
Start: 2021-05-03 | End: 2023-06-19 | Stop reason: SDUPTHER

## 2023-06-19 RX ORDER — BLOOD-GLUCOSE METER
KIT MISCELLANEOUS
Qty: 200 EACH | Refills: 1 | Status: SHIPPED | OUTPATIENT
Start: 2023-06-19

## 2023-06-19 RX ORDER — BLOOD-GLUCOSE METER
1 KIT MISCELLANEOUS 2 TIMES DAILY
Qty: 200 EACH | Refills: 3 | Status: SHIPPED | OUTPATIENT
Start: 2023-06-19

## 2023-06-19 RX ORDER — INSULIN GLARGINE 300 U/ML
INJECTION, SOLUTION SUBCUTANEOUS
Qty: 7.5 ML | Refills: 0 | Status: SHIPPED | OUTPATIENT
Start: 2023-06-19

## 2023-06-19 NOTE — TELEPHONE ENCOUNTER
From: Eris Causey  To: Dr. Rola Telles: 6/12/2023 2:29 PM EDT  Subject: [place]    Dr. Anni Santiago  I am almost out of Diabetic Test Strips. The [facility] has sent a refill request. Please approve.   Thanks    Kevin Ace

## 2023-07-03 RX ORDER — EMPAGLIFLOZIN 25 MG/1
TABLET, FILM COATED ORAL
Qty: 30 TABLET | Refills: 5 | Status: SHIPPED | OUTPATIENT
Start: 2023-07-03

## 2023-07-03 RX ORDER — LOSARTAN POTASSIUM AND HYDROCHLOROTHIAZIDE 25; 100 MG/1; MG/1
TABLET ORAL
Qty: 30 TABLET | Refills: 5 | Status: SHIPPED | OUTPATIENT
Start: 2023-07-03

## 2023-07-11 ENCOUNTER — HOSPITAL ENCOUNTER (OUTPATIENT)
Facility: HOSPITAL | Age: 63
Setting detail: RECURRING SERIES
Discharge: HOME | End: 2023-07-14
Payer: COMMERCIAL

## 2023-07-11 PROCEDURE — 97803 MED NUTRITION INDIV SUBSEQ: CPT | Performed by: DIETITIAN, REGISTERED

## 2023-07-11 NOTE — PROGRESS NOTES
Diabetes  []  Cholesterol  []  Sodium  []  SBGM  []  Food Journals  []  Others:      Patient Goals -continue 10k steps daily + gym 4 days per week  -on days volunteering, have a lighter meal or snack earlier to prevent low blood sugar.   -measure out portions consistently for awareness  -enjoy vacation. Focus on lean protein, vegetables, and limit starch to 1 cup per meal with fruit as dessert of choice.    -pack snacks for travel / plane (list provided)       PLAN  [x]  Continue on current plan []  Follow-up PRN   []  Discharge due to :    [x]  Next appt: 53-4 weeks     Dietitian: Tevin Sorto MS, RD, CSSD    Date: 7/11/2023 Time: 12:13 PM

## 2023-08-01 ENCOUNTER — HOSPITAL ENCOUNTER (OUTPATIENT)
Facility: HOSPITAL | Age: 63
Setting detail: RECURRING SERIES
Discharge: HOME | End: 2023-08-04
Payer: COMMERCIAL

## 2023-08-01 PROCEDURE — 97803 MED NUTRITION INDIV SUBSEQ: CPT | Performed by: DIETITIAN, REGISTERED

## 2023-08-01 NOTE — PROGRESS NOTES
sugars AEB report of consuming peanut butter for low blood sugar last week. [x]   Improved  []   No Change    []   Declined   []   Discontinued                  Food and nutrition related knowledge deficit R/T lack of prior education for healthy weight loss with diabetes AEB Request for counseling. [x]   Improved  []   No Change    []   Declined   []   Discontinued                   Nutrition Monitoring and Evaluation: SMBG =14 day average = 100mg/dl   Did well in the [place]. packed his own food. One instance of a low blood sugar. Exercised and only had 8g of carbohydrate at dinner meal. Woke up to 68mg/dl low. Felt off all day. Notes issues with bowel. More consistent than before. Using metamucil at night. Does not feel fully voided consistently. Can go 2 days without a bowel. Movement. Previous goals:   met. -continue 10k steps daily + gym 4 days per week  Improved. -on days volunteering, have a lighter meal or snack earlier to prevent low blood sugar. Improved. -measure out portions consistently for awareness  Met. -enjoy vacation. Focus on lean protein, vegetables, and limit starch to 1 cup per meal with fruit as dessert of choice. Met. -pack snacks for travel / plane (list provided)       Nutrition Prescription and  Intervention Self monitoring with food (food log not brought today) and blood sugar log to help idntify cause of higher blood sugars. Exercise goals. Reviewed srength training. Step tracking on sneha. >10k per day. Goal setting  Review of carb sources. Reviewed minimum amount of carbohydrate to have with meals based on blood sugars and current medication dosing. Encourage pt to be consistent with including carbohydrate at meals or discuss medication adjustments with his PCP if desiring to reduce carbs further. Logs showed that consuming <15g carb + the amount of exercise pt is currently doing led to a morning low.       Patient Education:  [x]  Review current plan with patient

## 2023-08-08 RX ORDER — LIRAGLUTIDE 6 MG/ML
INJECTION SUBCUTANEOUS
Qty: 6 ML | Refills: 3 | Status: SHIPPED | OUTPATIENT
Start: 2023-08-08

## 2023-09-01 RX ORDER — METFORMIN HYDROCHLORIDE 500 MG/1
TABLET, EXTENDED RELEASE ORAL
Qty: 360 TABLET | Refills: 1 | Status: SHIPPED | OUTPATIENT
Start: 2023-09-01

## 2023-09-02 SDOH — ECONOMIC STABILITY: FOOD INSECURITY: WITHIN THE PAST 12 MONTHS, YOU WORRIED THAT YOUR FOOD WOULD RUN OUT BEFORE YOU GOT MONEY TO BUY MORE.: NEVER TRUE

## 2023-09-02 SDOH — ECONOMIC STABILITY: INCOME INSECURITY: HOW HARD IS IT FOR YOU TO PAY FOR THE VERY BASICS LIKE FOOD, HOUSING, MEDICAL CARE, AND HEATING?: NOT HARD AT ALL

## 2023-09-02 SDOH — ECONOMIC STABILITY: FOOD INSECURITY: WITHIN THE PAST 12 MONTHS, THE FOOD YOU BOUGHT JUST DIDN'T LAST AND YOU DIDN'T HAVE MONEY TO GET MORE.: NEVER TRUE

## 2023-09-02 SDOH — ECONOMIC STABILITY: TRANSPORTATION INSECURITY
IN THE PAST 12 MONTHS, HAS LACK OF TRANSPORTATION KEPT YOU FROM MEETINGS, WORK, OR FROM GETTING THINGS NEEDED FOR DAILY LIVING?: NO

## 2023-09-02 SDOH — ECONOMIC STABILITY: HOUSING INSECURITY
IN THE LAST 12 MONTHS, WAS THERE A TIME WHEN YOU DID NOT HAVE A STEADY PLACE TO SLEEP OR SLEPT IN A SHELTER (INCLUDING NOW)?: NO

## 2023-09-05 ENCOUNTER — OFFICE VISIT (OUTPATIENT)
Age: 63
End: 2023-09-05
Payer: COMMERCIAL

## 2023-09-05 VITALS
WEIGHT: 254 LBS | BODY MASS INDEX: 40.82 KG/M2 | HEART RATE: 58 BPM | OXYGEN SATURATION: 97 % | SYSTOLIC BLOOD PRESSURE: 116 MMHG | TEMPERATURE: 97 F | RESPIRATION RATE: 18 BRPM | HEIGHT: 66 IN | DIASTOLIC BLOOD PRESSURE: 58 MMHG

## 2023-09-05 DIAGNOSIS — Z79.4 TYPE 2 DIABETES MELLITUS WITH HYPERGLYCEMIA, WITH LONG-TERM CURRENT USE OF INSULIN (HCC): Primary | ICD-10-CM

## 2023-09-05 DIAGNOSIS — Z79.4 TYPE 2 DIABETES MELLITUS WITH HYPERGLYCEMIA, WITH LONG-TERM CURRENT USE OF INSULIN (HCC): ICD-10-CM

## 2023-09-05 DIAGNOSIS — M12.811 ROTATOR CUFF ARTHROPATHY, RIGHT: ICD-10-CM

## 2023-09-05 DIAGNOSIS — E11.65 TYPE 2 DIABETES MELLITUS WITH HYPERGLYCEMIA, WITH LONG-TERM CURRENT USE OF INSULIN (HCC): Primary | ICD-10-CM

## 2023-09-05 DIAGNOSIS — E11.65 TYPE 2 DIABETES MELLITUS WITH HYPERGLYCEMIA, WITH LONG-TERM CURRENT USE OF INSULIN (HCC): ICD-10-CM

## 2023-09-05 LAB
EST. AVERAGE GLUCOSE BLD GHB EST-MCNC: 137 MG/DL
HBA1C MFR BLD: 6.4 % (ref 4–5.6)

## 2023-09-05 PROCEDURE — 3078F DIAST BP <80 MM HG: CPT | Performed by: INTERNAL MEDICINE

## 2023-09-05 PROCEDURE — 3074F SYST BP LT 130 MM HG: CPT | Performed by: INTERNAL MEDICINE

## 2023-09-05 PROCEDURE — 99213 OFFICE O/P EST LOW 20 MIN: CPT | Performed by: INTERNAL MEDICINE

## 2023-09-05 PROCEDURE — 3051F HG A1C>EQUAL 7.0%<8.0%: CPT | Performed by: INTERNAL MEDICINE

## 2023-09-05 RX ORDER — INSULIN GLARGINE 300 U/ML
INJECTION, SOLUTION SUBCUTANEOUS
Qty: 7.5 ML | Refills: 0 | Status: SHIPPED | OUTPATIENT
Start: 2023-09-05

## 2023-09-05 NOTE — PROGRESS NOTES
I have reviewed all needed documentation in preparation for visit. Verified patient by name and date of birth  Chief Complaint   Patient presents with    Follow-up     Med questions- fasting        There were no vitals filed for this visit. Health Maintenance Due   Topic Date Due    HIV screen  Never done    Pneumococcal 0-64 years Vaccine (2 - PCV) 12/01/2021    Prostate Specific Antigen (PSA) Screening or Monitoring  12/01/2021    COVID-19 Vaccine (4 - Booster for Moderna series) 12/21/2021    Flu vaccine (1) 08/01/2023       1. \"Have you been to the ER, urgent care clinic since your last visit? Hospitalized since your last visit? \" YES    2. \"Have you seen or consulted any other health care providers outside of the [address] [place] [address] since your last visit? \" No     3. For patients aged 43-73: Has the patient had a colonoscopy / FIT/ Cologuard? Yes - no Care Gap present      If the patient is female:    4. For patients aged 43-66: Has the patient had a mammogram within the past 2 years? NA - based on age or sex      11. For patients aged 21-65: Has the patient had a pap smear?  NA - based on age or sex        Karthik Cedillo, [address]

## 2023-09-05 NOTE — PROGRESS NOTES
Progress Notes by Caleb Delacruz MD at 05/04/23 1415                    Author: Caleb Delacruz MD  Service: --  Author Type: Physician       Filed: 05/05/23 0746  Encounter Date: 5/4/2023  Status: Signed                       Diabetic ROS - medication compliance: compliant all of the time, diabetic diet compliance: compliant most of the time, home glucose monitoring: is performed sporadically, fasting  values range 70 to 110, nonfasting values range 160, further diabetic ROS: no polyuria or polydipsia, no chest pain, dyspnea or TIA's, no numbness, tingling or pain in extremities, no unusual  visual symptoms, no hypoglycemia, no medication side effects noted. New concerns: wants dietary help. [facility]  Sugars under 200 takes insulin in morning  Left calf was sore for 4 days after injury on steps now better   Diabetic exam: heart sounds normal rate, regular rhythm, normal S1, S2, no murmurs, rubs, clicks or gallops, chest clear. Lab review: labs reviewed, I note that glycosylated hemoglobin better         Also  right shoulder pain has improved         A right shoulder exam was performed. GENERAL: no acute distress, alert, oriented x 3  S1 and S2 normal, no murmurs, clicks, gallops or rubs. Regular rate and rhythm. Chest is clear; no wheezes or rales. No edema or JVD. Results for orders placed or performed in visit on 02/01/23       AMB POC HEMOGLOBIN A1C         Result                      Lab Results         Component                         MRI Result (most recent):  MRI SHOULDER RIGHT WO CONTRAST 05/23/2023    Narrative  EXAM: MRI SHOULDER RIGHT WO CONTRAST    INDICATION: Right shoulder pain    COMPARISON: 2/2/2023    TECHNIQUE: Axial proton density fat-saturated; oblique coronal T1, T2  fat-saturated, and proton density fat-saturated; and oblique sagittal T2  fat-saturated MRI of the right shoulder .     CONTRAST:

## 2023-09-07 ENCOUNTER — HOSPITAL ENCOUNTER (OUTPATIENT)
Facility: HOSPITAL | Age: 63
Setting detail: RECURRING SERIES
Discharge: HOME | End: 2023-09-10
Payer: COMMERCIAL

## 2023-09-07 PROCEDURE — 97803 MED NUTRITION INDIV SUBSEQ: CPT | Performed by: DIETITIAN, REGISTERED

## 2023-09-07 NOTE — PROGRESS NOTES
NUTRITION - FOLLOW-UP TREATMENT NOTE  Patient Name: Umu Webster         Date: 2023  : 1960    YES Patient  Verified  Diagnosis:   E66.01 (ICD-10-CM) - Morbid obesity ([address])        E11.65,Z79.4 (ICD-10-CM) - Type 2 diabetes mellitus with hyperglycemia, with long-term current use of insulin (HCC)      In time:   11:30am    Out time:   12:00am   Total Treatment Time (min):   30     SUBJECTIVE/ASSESSMENT  Current Wt: 258.8 Previous Wt: 261.0 Wt Change: -2.2     Initial Wt: 278 (PCP) Total Wt change: -19.2 Height: 66     Changes in medication or medical history? Any new allergies, surgeries or procedures? No    If yes, update Summary List   Hemoglobin A1C   Date Value Ref Range Status   2023 6.4 (H) 4.0 - 5.6 % Final     Comment:     NEW METHOD  PLEASE NOTE NEW REFERENCE RANGE  (NOTE)  HbA1C Interpretive Ranges  <5.7              Normal  5.7 - 6.4         Consider Prediabetes  >6.5              Consider Diabetes         Current Outpatient Medications   Medication Instructions    aspirin 81 mg, Oral, DAILY    atorvastatin (LIPITOR) 20 mg, Oral, DAILY    blood glucose test strips (FREESTYLE LITE) strip Use 2 times a day  \"E11.9\"    blood glucose test strips (FREESTYLE LITE) strip 1 each, In Vitro, 2 TIMES DAILY, As needed.     insulin glargine, 1 unit dial, (TOUJEO SOLOSTAR) 300 UNIT/ML concentrated injection pen 25 to 30 U at HS    JARDIANCE 25 MG tablet TAKE 1 TABLET BY MOUTH DAILY    losartan-hydroCHLOROthiazide (HYZAAR) 100-25 MG per tablet TAKE 1 TABLET BY MOUTH EVERY DAY    metFORMIN (GLUCOPHAGE-XR) 500 MG extended release tablet TAKE 4 TABLETS BY MOUTH EVERY DAY AS DIRECTED    VICTOZA 18 MG/3ML SOPN SC injection ADMINISTER 1.2 MG UNDER THE SKIN DAILY    Wheat Dextrin (BENEFIBER) POWD 1 Scoop, Oral, DAILY     Lower dose of Toujeo         Nutrition Diagnosis        Diagnosis Status:  : Food and nutrition related knowledge deficit R./T lack of prior education for treating low blood  sugars

## 2023-10-27 RX ORDER — ATORVASTATIN CALCIUM 20 MG/1
20 TABLET, FILM COATED ORAL DAILY
Qty: 90 TABLET | Refills: 3 | Status: SHIPPED | OUTPATIENT
Start: 2023-10-27

## 2023-10-27 NOTE — TELEPHONE ENCOUNTER
Refill request received from [facility] for   Requested Prescriptions     Pending Prescriptions Disp Refills    atorvastatin (LIPITOR) 20 MG tablet [Pharmacy Med Name: ATORVASTATIN 20MG TABLETS] 90 tablet      Sig: TAKE 1 TABLET BY MOUTH DAILY     9/5/2023 1/18/2024     Routed to Dr Max Cortes for review.

## 2023-11-08 RX ORDER — FLURBIPROFEN SODIUM 0.3 MG/ML
SOLUTION/ DROPS OPHTHALMIC
Qty: 100 EACH | Refills: 11 | Status: SHIPPED | OUTPATIENT
Start: 2023-11-08

## 2023-11-09 ENCOUNTER — OFFICE VISIT (OUTPATIENT)
Age: 63
End: 2023-11-09

## 2023-11-09 VITALS
TEMPERATURE: 97 F | HEART RATE: 55 BPM | BODY MASS INDEX: 41.48 KG/M2 | DIASTOLIC BLOOD PRESSURE: 62 MMHG | OXYGEN SATURATION: 99 % | RESPIRATION RATE: 20 BRPM | SYSTOLIC BLOOD PRESSURE: 104 MMHG | WEIGHT: 257 LBS

## 2023-11-09 DIAGNOSIS — Z23 NEED FOR INFLUENZA VACCINATION: Primary | ICD-10-CM

## 2023-11-09 ASSESSMENT — ENCOUNTER SYMPTOMS
COUGH: 0
SORE THROAT: 0

## 2023-11-14 ENCOUNTER — HOSPITAL ENCOUNTER (OUTPATIENT)
Facility: HOSPITAL | Age: 63
Setting detail: RECURRING SERIES
Discharge: HOME | End: 2023-11-17
Payer: COMMERCIAL

## 2023-11-14 PROCEDURE — 97803 MED NUTRITION INDIV SUBSEQ: CPT | Performed by: DIETITIAN, REGISTERED

## 2023-11-14 NOTE — PROGRESS NOTES
Status:  : Food and nutrition related knowledge deficit R./T lack of prior education for treating low blood  sugars AEB report of consuming peanut butter for low blood sugar last week. [x]   Improved  []   No Change    []   Declined   []   Discontinued                  Food and nutrition related knowledge deficit R/T lack of prior education for healthy weight loss with diabetes AEB Request for counseling. [x]   Improved  []   No Change    []   Declined   []   Discontinued                   Nutrition Monitoring and Evaluation: No more issues with low blood sugars. SMBG = 76-112mg/dl. Mostly in the 80s-90s. Checking at various times of the day. Blood pressure has been good as well reported by pt. Confident in next few social outings. Notes some concern for Thanksgiving. Worked through today. Looking for snack ideas for some outings that are low in carb. Previous goals:  Met. Not yet talking with . -continue 10k steps daily + gym 4 days per week. Talk with  about   Improved. Plans to use for Thanksgiving .-when having events out with dessert, replace carb source at meal with dessert. Not addressed. -measure out portions consistently for awareness  Improved. -include complex carbohydrates at each meal. Minimum 15g. Recommended 30-40g per meal.        Nutrition Prescription and  Intervention Self monitoring with blood sugar log  Exercise goals. Reviewed srength training. Step tracking on sneha. >10k per day. Goal setting  Provided low carb snack ideas. Patient Education:  [x]  Review current plan with patient   []  Other:    Handouts/  Information Provided: []  Carbohydrates  []  Protein  []  Fiber  []  Serving Sizes  []  Fluids  []  General guidelines []  Diabetes  []  Cholesterol  []  Sodium  []  SBGM  []  Food Journals  [x]  Others: low carb snack ideas. Patient Goals -continue 10k steps daily + gym 4 days per week.  Talk with  about   -when

## 2023-11-15 DIAGNOSIS — E11.65 TYPE 2 DIABETES MELLITUS WITH HYPERGLYCEMIA, WITH LONG-TERM CURRENT USE OF INSULIN (HCC): ICD-10-CM

## 2023-11-15 DIAGNOSIS — Z79.4 TYPE 2 DIABETES MELLITUS WITH HYPERGLYCEMIA, WITH LONG-TERM CURRENT USE OF INSULIN (HCC): ICD-10-CM

## 2023-11-16 RX ORDER — INSULIN GLARGINE 300 U/ML
INJECTION, SOLUTION SUBCUTANEOUS
Qty: 7.5 ML | Refills: 5 | Status: SHIPPED | OUTPATIENT
Start: 2023-11-16

## 2023-12-21 ENCOUNTER — HOSPITAL ENCOUNTER (OUTPATIENT)
Facility: HOSPITAL | Age: 63
Setting detail: RECURRING SERIES
Discharge: HOME | End: 2023-12-24
Payer: COMMERCIAL

## 2023-12-21 PROCEDURE — 97803 MED NUTRITION INDIV SUBSEQ: CPT | Performed by: DIETITIAN, REGISTERED

## 2023-12-21 NOTE — PROGRESS NOTES
NUTRITION - FOLLOW-UP TREATMENT NOTE  Patient Name: Hung Lozano         Date: 2023  : 1960    YES Patient  Verified  Diagnosis:   E66.01 (ICD-10-CM) - Morbid obesity ([address])        E11.65,Z79.4 (ICD-10-CM) - Type 2 diabetes mellitus with hyperglycemia, with long-term current use of insulin (HCC)      In time:   10:00am    Out time:   10:30am   Total Treatment Time (min):   30     SUBJECTIVE/ASSESSMENT  Current Wt: 253.8 Previous Wt: 258.0 Wt Change: -4.2     Initial Wt: 278 (PCP) Total Wt change: -24.2 Height: 66     Changes in medication or medical history? Any new allergies, surgeries or procedures? No    If yes, update Summary List   Hemoglobin A1C   Date Value Ref Range Status   2023 6.4 (H) 4.0 - 5.6 % Final     Comment:     NEW METHOD  PLEASE NOTE NEW REFERENCE RANGE  (NOTE)  HbA1C Interpretive Ranges  <5.7              Normal  5.7 - 6.4         Consider Prediabetes  >6.5              Consider Diabetes         Current Outpatient Medications   Medication Instructions    aspirin 81 mg, Oral, DAILY    atorvastatin (LIPITOR) 20 mg, Oral, DAILY    B-D UF III MINI PEN NEEDLES 31G X 5 MM MISC USE TO INJECT INSULIN TWICE DAILY    blood glucose test strips (FREESTYLE LITE) strip Use 2 times a day  \"E11.9\"    blood glucose test strips (FREESTYLE LITE) strip 1 each, In Vitro, 2 TIMES DAILY, As needed. insulin glargine, 1 unit dial, (TOUJEO SOLOSTAR) 300 UNIT/ML concentrated injection pen ADMINISTER 25 TO 30 UNITS UNDER THE SKIN AT BEDTIME    JARDIANCE 25 MG tablet TAKE 1 TABLET BY MOUTH DAILY    losartan-hydroCHLOROthiazide (HYZAAR) 100-25 MG per tablet TAKE 1 TABLET BY MOUTH EVERY DAY    metFORMIN (GLUCOPHAGE-XR) 500 MG extended release tablet TAKE 4 TABLETS BY MOUTH EVERY DAY AS DIRECTED    VICTOZA 18 MG/3ML SOPN SC injection ADMINISTER 1.2 MG UNDER THE SKIN DAILY    Wheat Dextrin (BENEFIBER) POWD 1 Scoop, Oral, DAILY       . checking blood pressure at home and has been good.    Taking 2

## 2024-01-02 DIAGNOSIS — E11.65 TYPE 2 DIABETES MELLITUS WITH HYPERGLYCEMIA, WITH LONG-TERM CURRENT USE OF INSULIN (HCC): ICD-10-CM

## 2024-01-02 DIAGNOSIS — Z79.4 TYPE 2 DIABETES MELLITUS WITH HYPERGLYCEMIA, WITH LONG-TERM CURRENT USE OF INSULIN (HCC): ICD-10-CM

## 2024-01-02 RX ORDER — EMPAGLIFLOZIN 25 MG/1
TABLET, FILM COATED ORAL
Qty: 30 TABLET | Refills: 5 | Status: SHIPPED | OUTPATIENT
Start: 2024-01-02

## 2024-01-02 RX ORDER — LOSARTAN POTASSIUM AND HYDROCHLOROTHIAZIDE 25; 100 MG/1; MG/1
TABLET ORAL
Qty: 30 TABLET | Refills: 5 | Status: SHIPPED | OUTPATIENT
Start: 2024-01-02

## 2024-01-02 NOTE — TELEPHONE ENCOUNTER
PT needs refill for blood glucose test strips (FREESTYLE LITE) strip and   B-D UF III MINI PEN NEEDLES 31G X 5 MM MISC

## 2024-01-02 NOTE — TELEPHONE ENCOUNTER
Refill request received from [facility] for   Requested Prescriptions     Pending Prescriptions Disp Refills    blood glucose test strips (FREESTYLE LITE) strip 200 each 3     Si each by In Vitro route 2 times daily As needed.     Last office visit: 2023   Next office visit: 2024     Routed to Dr Vu Gutiérrez for review.

## 2024-01-03 RX ORDER — BLOOD-GLUCOSE METER
1 KIT MISCELLANEOUS 2 TIMES DAILY
Qty: 200 EACH | Refills: 3 | Status: SHIPPED | OUTPATIENT
Start: 2024-01-03

## 2024-01-18 ENCOUNTER — OFFICE VISIT (OUTPATIENT)
Age: 64
End: 2024-01-18
Payer: COMMERCIAL

## 2024-01-18 VITALS
DIASTOLIC BLOOD PRESSURE: 56 MMHG | SYSTOLIC BLOOD PRESSURE: 112 MMHG | OXYGEN SATURATION: 97 % | RESPIRATION RATE: 18 BRPM | HEART RATE: 54 BPM | BODY MASS INDEX: 41.06 KG/M2 | TEMPERATURE: 98.1 F | WEIGHT: 254.4 LBS

## 2024-01-18 DIAGNOSIS — E11.65 TYPE 2 DIABETES MELLITUS WITH HYPERGLYCEMIA, WITH LONG-TERM CURRENT USE OF INSULIN (HCC): Primary | ICD-10-CM

## 2024-01-18 DIAGNOSIS — Z79.4 TYPE 2 DIABETES MELLITUS WITH HYPERGLYCEMIA, WITH LONG-TERM CURRENT USE OF INSULIN (HCC): Primary | ICD-10-CM

## 2024-01-18 DIAGNOSIS — E11.65 TYPE 2 DIABETES MELLITUS WITH HYPERGLYCEMIA, WITH LONG-TERM CURRENT USE OF INSULIN (HCC): ICD-10-CM

## 2024-01-18 DIAGNOSIS — E78.00 PURE HYPERCHOLESTEROLEMIA: ICD-10-CM

## 2024-01-18 DIAGNOSIS — Z79.4 TYPE 2 DIABETES MELLITUS WITH HYPERGLYCEMIA, WITH LONG-TERM CURRENT USE OF INSULIN (HCC): ICD-10-CM

## 2024-01-18 DIAGNOSIS — M75.101 SUPRASPINATUS SYNDROME OF RIGHT SHOULDER: ICD-10-CM

## 2024-01-18 DIAGNOSIS — I10 HYPERTENSION, UNSPECIFIED TYPE: ICD-10-CM

## 2024-01-18 PROCEDURE — 3074F SYST BP LT 130 MM HG: CPT | Performed by: INTERNAL MEDICINE

## 2024-01-18 PROCEDURE — 99214 OFFICE O/P EST MOD 30 MIN: CPT | Performed by: INTERNAL MEDICINE

## 2024-01-18 PROCEDURE — 3078F DIAST BP <80 MM HG: CPT | Performed by: INTERNAL MEDICINE

## 2024-01-18 ASSESSMENT — PATIENT HEALTH QUESTIONNAIRE - PHQ9
SUM OF ALL RESPONSES TO PHQ QUESTIONS 1-9: 0
SUM OF ALL RESPONSES TO PHQ9 QUESTIONS 1 & 2: 0
1. LITTLE INTEREST OR PLEASURE IN DOING THINGS: 0
2. FEELING DOWN, DEPRESSED OR HOPELESS: 0
SUM OF ALL RESPONSES TO PHQ QUESTIONS 1-9: 0

## 2024-01-18 NOTE — PROGRESS NOTES
I have reviewed all needed documentation in preparation for visit. Verified patient by name and date of birth  Chief Complaint   Patient presents with    Follow-up    Diabetes     F/U on diabetes.        There were no vitals filed for this visit.    Health Maintenance Due   Topic Date Due    HIV screen  Never done    Respiratory Syncytial Virus (RSV) Pregnant or age 60 yrs+ (1 - 1-dose 60+ series) Never done    Pneumococcal 0-64 years Vaccine (2 - PCV) 12/01/2021    Prostate Specific Antigen (PSA) Screening or Monitoring  12/01/2021    COVID-19 Vaccine (4 - 2023-24 season) 09/01/2023    Diabetic Alb to Cr ratio (uACR) test  10/27/2023    Lipids  10/27/2023    Diabetic foot exam  02/01/2024    Diabetic retinal exam  02/15/2024       1. \"Have you been to the ER, urgent care clinic since your last visit?  Hospitalized since your last visit?\" No    2. \"Have you seen or consulted any other health care providers outside of the [facility] since your last visit?\" Yes, Dentist.    3. For patients aged 45-75: Has the patient had a colonoscopy / FIT/ Cologuard? Yes, up to date.       If the patient is female:    4. For patients aged 40-74: Has the patient had a mammogram within the past 2 years? NA      5. For patients aged 21-65: Has the patient had a pap smear? CALIN Brooks

## 2024-01-19 LAB
ALBUMIN SERPL-MCNC: 3.7 G/DL (ref 3.5–5)
ALBUMIN/GLOB SERPL: 1.1 (ref 1.1–2.2)
ALP SERPL-CCNC: 74 U/L (ref 45–117)
ALT SERPL-CCNC: 31 U/L (ref 12–78)
ANION GAP SERPL CALC-SCNC: 8 MMOL/L (ref 5–15)
AST SERPL-CCNC: 24 U/L (ref 15–37)
BILIRUB SERPL-MCNC: 0.5 MG/DL (ref 0.2–1)
BUN SERPL-MCNC: 15 MG/DL (ref 6–20)
BUN/CREAT SERPL: 20 (ref 12–20)
CALCIUM SERPL-MCNC: 9.4 MG/DL (ref 8.5–10.1)
CHLORIDE SERPL-SCNC: 108 MMOL/L (ref 97–108)
CHOLEST SERPL-MCNC: 136 MG/DL
CO2 SERPL-SCNC: 26 MMOL/L (ref 21–32)
CREAT SERPL-MCNC: 0.75 MG/DL (ref 0.7–1.3)
CREAT UR-MCNC: 144 MG/DL
EST. AVERAGE GLUCOSE BLD GHB EST-MCNC: 137 MG/DL
GLOBULIN SER CALC-MCNC: 3.5 G/DL (ref 2–4)
GLUCOSE SERPL-MCNC: 90 MG/DL (ref 65–100)
HBA1C MFR BLD: 6.4 % (ref 4–5.6)
HDLC SERPL-MCNC: 73 MG/DL
HDLC SERPL: 1.9 (ref 0–5)
LDLC SERPL CALC-MCNC: 52.6 MG/DL (ref 0–100)
MICROALBUMIN UR-MCNC: 1.1 MG/DL
MICROALBUMIN/CREAT UR-RTO: 8 MG/G (ref 0–30)
POTASSIUM SERPL-SCNC: 3.7 MMOL/L (ref 3.5–5.1)
PROT SERPL-MCNC: 7.2 G/DL (ref 6.4–8.2)
SODIUM SERPL-SCNC: 142 MMOL/L (ref 136–145)
TRIGL SERPL-MCNC: 52 MG/DL
VLDLC SERPL CALC-MCNC: 10.4 MG/DL

## 2024-01-19 NOTE — PROGRESS NOTES
SUBJECTIVE: Jasson Quiñones is a 63 y.o. male seen for a follow up visit; he has diabetes, hypertension, and hyperlipidemia.  Current Outpatient Medications   Medication Sig Dispense Refill    JARDIANCE 25 MG tablet TAKE 1 TABLET BY MOUTH DAILY 30 tablet 5    losartan-hydroCHLOROthiazide (HYZAAR) 100-25 MG per tablet TAKE 1 TABLET BY MOUTH EVERY DAY 30 tablet 5    insulin glargine, 1 unit dial, (TOUJEO SOLOSTAR) 300 UNIT/ML concentrated injection pen ADMINISTER 25 TO 30 UNITS UNDER THE SKIN AT BEDTIME 7.5 mL 5    B-D UF III MINI PEN NEEDLES 31G X 5 MM MISC USE TO INJECT INSULIN TWICE DAILY 100 each 11    atorvastatin (LIPITOR) 20 MG tablet TAKE 1 TABLET BY MOUTH DAILY 90 tablet 3    metFORMIN (GLUCOPHAGE-XR) 500 MG extended release tablet TAKE 4 TABLETS BY MOUTH EVERY DAY AS DIRECTED 360 tablet 1    VICTOZA 18 MG/3ML SOPN SC injection ADMINISTER 1.2 MG UNDER THE SKIN DAILY 6 mL 3    blood glucose test strips (FREESTYLE LITE) strip Use 2 times a day  \"E11.9\" 200 each 1    aspirin 81 MG EC tablet Take 1 tablet by mouth daily      Wheat Dextrin (BENEFIBER) POWD Take 1 Scoop by mouth daily      blood glucose test strips (FREESTYLE LITE) strip 1 each by In Vitro route 2 times daily As needed. (Patient not taking: Reported on 1/18/2024) 200 each 3     No current facility-administered medications for this visit.     Patient Active Problem List   Diagnosis    Hypertension    Primary osteoarthritis of one knee    Proteinuria    Morbid obesity (HCC)    PAC (premature atrial contraction)    Diabetes (HCC)    Type II diabetes mellitus, uncontrolled    Dyslipidemia, goal LDL below 100    Rotator cuff arthropathy, right     System Review: Cardiovascular ROS - taking medications as instructed, no medication side effects noted, no TIA's, no chest pain on exertion, no dyspnea on exertion, no swelling of ankles, Diabetic ROS - medication compliance: compliant all of the time, diabetic diet compliance: compliant most of the time,

## 2024-01-23 ENCOUNTER — HOSPITAL ENCOUNTER (OUTPATIENT)
Facility: HOSPITAL | Age: 64
Setting detail: RECURRING SERIES
Discharge: HOME | End: 2024-01-26
Payer: COMMERCIAL

## 2024-01-23 PROCEDURE — 97803 MED NUTRITION INDIV SUBSEQ: CPT | Performed by: DIETITIAN, REGISTERED

## 2024-01-23 NOTE — PROGRESS NOTES
NUTRITION - FOLLOW-UP TREATMENT NOTE  Patient Name: Jasson Quiñones         Date: 2024  : 1960    YES Patient  Verified  Diagnosis:   E66.01 (ICD-10-CM) - Morbid obesity (HCC)        E11.65,Z79.4 (ICD-10-CM) - Type 2 diabetes mellitus with hyperglycemia, with long-term current use of insulin (HCC)      In time:   10:00am   Out time: 10:30am   Total Treatment Time (min):   30     SUBJECTIVE/ASSESSMENT  Current Wt: 251.6 Previous Wt: 253.8 Wt Change: -2.2     Initial Wt: 278 (PCP) Total Wt change: -26.4 Height: 66     Changes in medication or medical history? Any new allergies, surgeries or procedures?    Yes   If yes, update Summary List   Hemoglobin A1C   Date Value Ref Range Status   2024 6.4 (H) 4.0 - 5.6 % Final     Comment:     (NOTE)  HbA1C Interpretive Ranges  <5.7              Normal  5.7 - 6.4         Consider Prediabetes  >6.5              Consider Diabetes         Current Outpatient Medications   Medication Instructions    aspirin 81 mg, Oral, DAILY    atorvastatin (LIPITOR) 20 mg, Oral, DAILY    B-D UF III MINI PEN NEEDLES 31G X 5 MM MISC USE TO INJECT INSULIN TWICE DAILY    blood glucose test strips (FREESTYLE LITE) strip Use 2 times a day  \"E11.9\"    blood glucose test strips (FREESTYLE LITE) strip 1 each, In Vitro, 2 TIMES DAILY, As needed.    insulin glargine, 1 unit dial, (TOUJEO SOLOSTAR) 300 UNIT/ML concentrated injection pen ADMINISTER 25 TO 30 UNITS UNDER THE SKIN AT BEDTIME    JARDIANCE 25 MG tablet TAKE 1 TABLET BY MOUTH DAILY    losartan-hydroCHLOROthiazide (HYZAAR) 100-25 MG per tablet TAKE 1 TABLET BY MOUTH EVERY DAY    metFORMIN (GLUCOPHAGE-XR) 500 MG extended release tablet TAKE 4 TABLETS BY MOUTH EVERY DAY AS DIRECTED    VICTOZA 18 MG/3ML SOPN SC injection ADMINISTER 1.2 MG UNDER THE SKIN DAILY    Wheat Dextrin (BENEFIBER) POWD 1 Scoop, Oral, DAILY   Pt has reduced Toujeo to 25 units under MD supervision.   Not using metamucil as consistently with using prunes.

## 2024-02-09 RX ORDER — LIRAGLUTIDE 6 MG/ML
INJECTION SUBCUTANEOUS
Qty: 6 ML | Refills: 3 | Status: SHIPPED | OUTPATIENT
Start: 2024-02-09

## 2024-02-28 RX ORDER — METFORMIN HYDROCHLORIDE 500 MG/1
TABLET, EXTENDED RELEASE ORAL
Qty: 120 TABLET | Refills: 0 | Status: SHIPPED | OUTPATIENT
Start: 2024-02-28

## 2024-02-28 NOTE — TELEPHONE ENCOUNTER
Refill request received from [facility] for   Requested Prescriptions     Pending Prescriptions Disp Refills    metFORMIN (GLUCOPHAGE-XR) 500 MG extended release tablet [Pharmacy Med Name: METFORMIN ER 500MG 24HR TABS] 360 tablet 1     Sig: TAKE 4 TABLETS BY MOUTH EVERY DAY AS DIRECTED     Last appointment: 1/18/2024   Next appointment: 2/29/2024     Routed to HANNY Benito for review.

## 2024-02-29 ENCOUNTER — OFFICE VISIT (OUTPATIENT)
Age: 64
End: 2024-02-29
Payer: COMMERCIAL

## 2024-02-29 VITALS
BODY MASS INDEX: 41.95 KG/M2 | DIASTOLIC BLOOD PRESSURE: 70 MMHG | SYSTOLIC BLOOD PRESSURE: 106 MMHG | HEART RATE: 67 BPM | RESPIRATION RATE: 18 BRPM | WEIGHT: 261 LBS | HEIGHT: 66 IN | OXYGEN SATURATION: 94 % | TEMPERATURE: 97.6 F

## 2024-02-29 DIAGNOSIS — K64.9 HEMORRHOIDS, UNSPECIFIED HEMORRHOID TYPE: Primary | ICD-10-CM

## 2024-02-29 DIAGNOSIS — K62.1 ANORECTAL POLYP: ICD-10-CM

## 2024-02-29 DIAGNOSIS — K62.0 ANORECTAL POLYP: ICD-10-CM

## 2024-02-29 PROCEDURE — 3078F DIAST BP <80 MM HG: CPT | Performed by: INTERNAL MEDICINE

## 2024-02-29 PROCEDURE — 3074F SYST BP LT 130 MM HG: CPT | Performed by: INTERNAL MEDICINE

## 2024-02-29 PROCEDURE — 99213 OFFICE O/P EST LOW 20 MIN: CPT | Performed by: INTERNAL MEDICINE

## 2024-02-29 RX ORDER — LIDOCAINE HYDROCHLORIDE AND HYDROCORTISONE ACETATE 30; 5 MG/G; MG/G
CREAM RECTAL 2 TIMES DAILY
Qty: 28 G | Refills: 0 | Status: SHIPPED | OUTPATIENT
Start: 2024-02-29 | End: 2024-03-30

## 2024-02-29 ASSESSMENT — ENCOUNTER SYMPTOMS
COUGH: 0
SHORTNESS OF BREATH: 0

## 2024-02-29 NOTE — PROGRESS NOTES
Jasson Quiñones is a 63 y.o. male  Chief Complaint   Patient presents with    Hemorrhoids     No concerns          Vitals:    02/29/24 0809   BP: 106/70   Pulse: 67   Resp: 18   Temp: 97.6 °F (36.4 °C)   SpO2: 94%          HPI  Mr. Quiñones is a 63 y.o. male with history of colonic polyp presents with swelling outside of his anus for over 2 weeks, the swelling was painful once but he doesn't have pain now or most of the tiem, tried sitz bath and preparation H wihtout benefit. Symptoms started suddenly, without straining. The swelling didn't progress, same size since onset. He didn't have diarrha, n/v, fever or chills, night sweats, weight loss. He has colonoscopy scheduled for march.       Exam non tender, globular mass that could be cyst vs hemorrhoid   .  Past Medical History:   Diagnosis Date    Diabetes (HCC)     Hypercholesterolemia     Hypertension     Morbid obesity (HCC)     Proteinuria             ROS  Review of Systems   Constitutional:  Negative for fever.   Respiratory:  Negative for cough and shortness of breath.    Cardiovascular:  Negative for chest pain and palpitations.   Neurological:  Negative for headaches.   Psychiatric/Behavioral:  Negative for dysphoric mood.            EXAM  Physical Exam  Vitals reviewed.   Constitutional:       Appearance: Normal appearance.   HENT:      Head: Normocephalic and atraumatic.   Cardiovascular:      Rate and Rhythm: Normal rate.   Genitourinary:     Prostate: Enlarged.      Rectum: External hemorrhoid present.          Comments: Round external mass, doesn't appear vascular, not reducible. About 2 cm  diameter   Neurological:      Mental Status: He is alert.         Health Maintenance Due   Topic Date Due    HIV screen  Never done    Respiratory Syncytial Virus (RSV) Pregnant or age 60 yrs+ (1 - 1-dose 60+ series) Never done    Pneumococcal 0-64 years Vaccine (2 - PCV) 12/01/2021    Prostate Specific Antigen (PSA) Screening or Monitoring  12/01/2021    COVID-19

## 2024-02-29 NOTE — PROGRESS NOTES
I have reviewed all needed documentation in preparation for visit. Verified patient by name and date of birth  Chief Complaint   Patient presents with    Hemorrhoids     No concerns          Vitals:    02/29/24 0809   BP: 106/70   Site: Left Upper Arm   Position: Sitting   Cuff Size: Medium Adult   Pulse: 67   Resp: 18   Temp: 97.6 °F (36.4 °C)   TempSrc: Temporal   SpO2: 94%   Weight: 118.4 kg (261 lb)   Height: 1.676 m (5' 6\")       Health Maintenance Due   Topic Date Due    HIV screen  Never done    Respiratory Syncytial Virus (RSV) Pregnant or age 60 yrs+ (1 - 1-dose 60+ series) Never done    Pneumococcal 0-64 years Vaccine (2 - PCV) 12/01/2021    Prostate Specific Antigen (PSA) Screening or Monitoring  12/01/2021    COVID-19 Vaccine (4 - 2023-24 season) 09/01/2023    Diabetic foot exam  02/01/2024    Diabetic retinal exam  02/15/2024       1. \"Have you been to the ER, urgent care clinic since your last visit?  Hospitalized since your last visit?\" YES    2. \"Have you seen or consulted any other health care providers outside of the [facility] since your last visit?\" No     3. For patients aged 45-75: Has the patient had a colonoscopy / FIT/ Cologuard? Yes - no Care Gap present      If the patient is female:    4. For patients aged 40-74: Has the patient had a mammogram within the past 2 years? NA - based on age or sex      5. For patients aged 21-65: Has the patient had a pap smear? NA - based on age or sex        Lisy valdivia CMA

## 2024-03-03 ENCOUNTER — HOSPITAL ENCOUNTER (EMERGENCY)
Facility: HOSPITAL | Age: 64
Discharge: HOME | End: 2024-03-03
Attending: EMERGENCY MEDICINE
Payer: COMMERCIAL

## 2024-03-03 ENCOUNTER — APPOINTMENT (OUTPATIENT)
Facility: HOSPITAL | Age: 64
End: 2024-03-03
Payer: COMMERCIAL

## 2024-03-03 VITALS
TEMPERATURE: 98.1 F | DIASTOLIC BLOOD PRESSURE: 73 MMHG | HEIGHT: 66 IN | OXYGEN SATURATION: 97 % | RESPIRATION RATE: 14 BRPM | WEIGHT: 256 LBS | SYSTOLIC BLOOD PRESSURE: 129 MMHG | BODY MASS INDEX: 41.14 KG/M2 | HEART RATE: 59 BPM

## 2024-03-03 DIAGNOSIS — R07.9 CHEST PAIN, UNSPECIFIED TYPE: Primary | ICD-10-CM

## 2024-03-03 LAB
ALBUMIN SERPL-MCNC: 4.1 G/DL (ref 3.5–5.2)
ALBUMIN/GLOB SERPL: 1.2 (ref 1.1–2.2)
ALP SERPL-CCNC: 86 U/L (ref 40–129)
ALT SERPL-CCNC: 23 U/L (ref 10–50)
ANION GAP SERPL CALC-SCNC: 12 MMOL/L (ref 5–15)
AST SERPL-CCNC: 30 U/L (ref 10–50)
BASOPHILS # BLD: 0.1 K/UL (ref 0–1)
BASOPHILS NFR BLD: 1 % (ref 0–1)
BILIRUB SERPL-MCNC: 0.4 MG/DL (ref 0.2–1)
BUN SERPL-MCNC: 29 MG/DL (ref 8–23)
BUN/CREAT SERPL: 24 (ref 12–20)
CALCIUM SERPL-MCNC: 9.1 MG/DL (ref 8.8–10.2)
CHLORIDE SERPL-SCNC: 101 MMOL/L (ref 98–107)
CO2 SERPL-SCNC: 25 MMOL/L (ref 22–29)
CREAT SERPL-MCNC: 1.2 MG/DL (ref 0.7–1.2)
DIFFERENTIAL METHOD BLD: ABNORMAL
EOSINOPHIL # BLD: 0.2 K/UL (ref 0–0.4)
EOSINOPHIL NFR BLD: 2 % (ref 0–7)
ERYTHROCYTE [DISTWIDTH] IN BLOOD BY AUTOMATED COUNT: 15.4 % (ref 11.5–14.5)
GLOBULIN SER CALC-MCNC: 3.3 G/DL (ref 2–4)
GLUCOSE SERPL-MCNC: 126 MG/DL (ref 65–100)
HCT VFR BLD AUTO: 44.1 % (ref 36.6–50.3)
HGB BLD-MCNC: 14.2 G/DL (ref 12.1–17)
IMM GRANULOCYTES # BLD AUTO: 0 K/UL (ref 0–0.04)
IMM GRANULOCYTES NFR BLD AUTO: 0 % (ref 0–0.5)
LIPASE SERPL-CCNC: 29 U/L (ref 13–60)
LYMPHOCYTES # BLD: 2.7 K/UL (ref 0.8–3.5)
LYMPHOCYTES NFR BLD: 30 % (ref 12–49)
MCH RBC QN AUTO: 26.4 PG (ref 26–34)
MCHC RBC AUTO-ENTMCNC: 32.2 G/DL (ref 30–36.5)
MCV RBC AUTO: 82 FL (ref 80–99)
MONOCYTES # BLD: 0.9 K/UL (ref 0–1)
MONOCYTES NFR BLD: 10 % (ref 5–13)
NEUTS SEG # BLD: 5.1 K/UL (ref 1.8–8)
NEUTS SEG NFR BLD: 57 % (ref 32–75)
NRBC # BLD: 0 K/UL (ref 0–0.01)
NRBC BLD-RTO: 0 PER 100 WBC
PLATELET # BLD AUTO: 260 K/UL (ref 150–400)
PMV BLD AUTO: 10.4 FL (ref 8.9–12.9)
POTASSIUM SERPL-SCNC: 4.1 MMOL/L (ref 3.5–5.1)
PROT SERPL-MCNC: 7.4 G/DL (ref 6.4–8.3)
RBC # BLD AUTO: 5.38 M/UL (ref 4.1–5.7)
SODIUM SERPL-SCNC: 138 MMOL/L (ref 136–145)
TROPONIN I BLD-MCNC: <0.04 NG/ML (ref 0–0.08)
TROPONIN I BLD-MCNC: <0.04 NG/ML (ref 0–0.08)
WBC # BLD AUTO: 8.9 K/UL (ref 4.1–11.1)

## 2024-03-03 PROCEDURE — 71046 X-RAY EXAM CHEST 2 VIEWS: CPT

## 2024-03-03 PROCEDURE — 85025 COMPLETE CBC W/AUTO DIFF WBC: CPT

## 2024-03-03 PROCEDURE — 84484 ASSAY OF TROPONIN QUANT: CPT

## 2024-03-03 PROCEDURE — 83690 ASSAY OF LIPASE: CPT

## 2024-03-03 PROCEDURE — 36415 COLL VENOUS BLD VENIPUNCTURE: CPT

## 2024-03-03 PROCEDURE — 80053 COMPREHEN METABOLIC PANEL: CPT

## 2024-03-03 PROCEDURE — 99285 EMERGENCY DEPT VISIT HI MDM: CPT

## 2024-03-03 PROCEDURE — 93005 ELECTROCARDIOGRAM TRACING: CPT | Performed by: EMERGENCY MEDICINE

## 2024-03-03 ASSESSMENT — PAIN DESCRIPTION - LOCATION: LOCATION: CHEST

## 2024-03-03 ASSESSMENT — PAIN - FUNCTIONAL ASSESSMENT
PAIN_FUNCTIONAL_ASSESSMENT: 0-10
PAIN_FUNCTIONAL_ASSESSMENT: NONE - DENIES PAIN

## 2024-03-03 ASSESSMENT — LIFESTYLE VARIABLES
HOW OFTEN DO YOU HAVE A DRINK CONTAINING ALCOHOL: NEVER
HOW MANY STANDARD DRINKS CONTAINING ALCOHOL DO YOU HAVE ON A TYPICAL DAY: PATIENT DOES NOT DRINK

## 2024-03-03 ASSESSMENT — PAIN SCALES - GENERAL: PAINLEVEL_OUTOF10: 5

## 2024-03-03 ASSESSMENT — HEART SCORE: ECG: 0

## 2024-03-03 NOTE — ED TRIAGE NOTES
Pt reports to ED w/ cc of chest pain that starts in his lower left chest. Pain feels like a sharp burning.     Pain started yesterday and went away and came back around 1700 today

## 2024-03-03 NOTE — ED PROVIDER NOTES
[facility] [place]  EMERGENCY DEPARTMENT ENCOUNTER      Pt Name: Jasson Quiñones  MRN: [id number]  Birthdate 1960  Date of evaluation: 3/3/2024  Provider: REMY Corral NP    CHIEF COMPLAINT       Chief Complaint   Patient presents with    Chest Pain         HISTORY OF PRESENT ILLNESS   (Location/Symptom, Timing/Onset, Context/Setting, Quality, Duration, Modifying Factors, Severity)  Note limiting factors.   Jasson Quiñones is a 63 y.o. male presenting to the ED c/o intermittent left sided chest pain that seems to \"shooting across the chest\" since yesterday that seem to worsen around 4:30 -5 PM today. Pt reports he was just sitting when pain started. + family hx heart disease.     Denies sob, recent travels, recent hospitalizations, cold-like symptoms, nausea, vomiting,  taking med for pain, hx cardiac hx, leg swelling, hx blood clots, CA/ chemo, new activities.     Medical hx: DM, hypercholesterolemia, HTN, proteinuria  Surgical hx: appendectomy, colonoscopy  Social hx: denies smoking, etoh, drug, marijuana.     The history is provided by the patient. No  was used.         Review of External Medical Records:     Nursing Notes were reviewed.    REVIEW OF SYSTEMS    (2-9 systems for level 4, 10 or more for level 5)     Review of Systems   Constitutional:  Negative for activity change, appetite change, chills and fever.   HENT:  Negative for rhinorrhea and sore throat.    Respiratory:  Negative for cough and shortness of breath.    Cardiovascular:  Positive for chest pain. Negative for leg swelling.   Gastrointestinal:  Negative for abdominal pain, nausea and vomiting.   Musculoskeletal:  Negative for myalgias.   Skin:  Negative for rash.   Neurological:  Negative for weakness, numbness and headaches.   All other systems reviewed and are negative.      Except as noted above the remainder of the review of systems was reviewed and negative.       PAST MEDICAL HISTORY     Past

## 2024-03-04 LAB
EKG ATRIAL RATE: 63 BPM
EKG DIAGNOSIS: NORMAL
EKG P AXIS: 70 DEGREES
EKG P-R INTERVAL: 148 MS
EKG Q-T INTERVAL: 396 MS
EKG QRS DURATION: 104 MS
EKG QTC CALCULATION (BAZETT): 405 MS
EKG R AXIS: -39 DEGREES
EKG T AXIS: 8 DEGREES
EKG VENTRICULAR RATE: 63 BPM

## 2024-03-04 PROCEDURE — 93010 ELECTROCARDIOGRAM REPORT: CPT | Performed by: SPECIALIST

## 2024-03-04 NOTE — ED NOTES
Patient reports symptom improvement or at least no worsening of symptoms since arrival to ED.  Patient denies pain.  VSS at time of discharge.  I have reviewed discharge instructions with the patient, who verbalized understanding. Patient stable and discharged from ED via AMBULATORY  and with SELF.   If applicable, PIV removed yes x2

## 2024-03-04 NOTE — ED NOTES
Introduced self to patient as primary RN.  Pt A&Ox4.  Patient connected to bedside monitor x3. Call bell within reach and bed in low position.

## 2024-03-04 NOTE — DISCHARGE INSTRUCTIONS
Follow-up closely with your primary care provider and a cardiologist. Call for appointment as soon as possible.

## 2024-03-04 NOTE — ED NOTES
Bedside shift change report given to Anel RN  (oncoming nurse) by Yordy RN  (offgoing nurse). Report included the following information Nurse Handoff Report, Index, ED Encounter Summary, and ED SBAR.

## 2024-03-07 ENCOUNTER — HOSPITAL ENCOUNTER (OUTPATIENT)
Facility: HOSPITAL | Age: 64
Setting detail: RECURRING SERIES
Discharge: HOME | End: 2024-03-10
Payer: COMMERCIAL

## 2024-03-07 PROCEDURE — 97803 MED NUTRITION INDIV SUBSEQ: CPT | Performed by: DIETITIAN, REGISTERED

## 2024-03-26 ENCOUNTER — OFFICE VISIT (OUTPATIENT)
Age: 64
End: 2024-03-26
Payer: COMMERCIAL

## 2024-03-26 VITALS
HEART RATE: 80 BPM | BODY MASS INDEX: 42.11 KG/M2 | RESPIRATION RATE: 18 BRPM | DIASTOLIC BLOOD PRESSURE: 78 MMHG | OXYGEN SATURATION: 98 % | TEMPERATURE: 97.7 F | HEIGHT: 66 IN | WEIGHT: 262 LBS | SYSTOLIC BLOOD PRESSURE: 138 MMHG

## 2024-03-26 DIAGNOSIS — K64.5 EXTERNAL HEMORRHOID, THROMBOSED: Primary | ICD-10-CM

## 2024-03-26 PROCEDURE — 99203 OFFICE O/P NEW LOW 30 MIN: CPT | Performed by: SURGERY

## 2024-03-26 PROCEDURE — 3075F SYST BP GE 130 - 139MM HG: CPT | Performed by: SURGERY

## 2024-03-26 PROCEDURE — 3078F DIAST BP <80 MM HG: CPT | Performed by: SURGERY

## 2024-03-26 ASSESSMENT — PATIENT HEALTH QUESTIONNAIRE - PHQ9
SUM OF ALL RESPONSES TO PHQ QUESTIONS 1-9: 0
SUM OF ALL RESPONSES TO PHQ QUESTIONS 1-9: 0
1. LITTLE INTEREST OR PLEASURE IN DOING THINGS: NOT AT ALL
SUM OF ALL RESPONSES TO PHQ9 QUESTIONS 1 & 2: 0
2. FEELING DOWN, DEPRESSED OR HOPELESS: NOT AT ALL
SUM OF ALL RESPONSES TO PHQ QUESTIONS 1-9: 0
SUM OF ALL RESPONSES TO PHQ QUESTIONS 1-9: 0

## 2024-03-27 NOTE — PROGRESS NOTES
Identified patient with two patient identifiers (name and ). Reviewed chart in preparation for visit and have obtained necessary documentation.    Jasson Quiñones is a 63 y.o. male  Chief Complaint   Patient presents with    New Patient    Hemorrhoids     /78 (Site: Right Upper Arm, Position: Sitting, Cuff Size: Large Adult)   Pulse 80   Temp 97.7 °F (36.5 °C)   Resp 18   Ht 1.676 m (5' 6\")   Wt 118.8 kg (262 lb)   SpO2 98%   BMI 42.29 kg/m²     1. Have you been to the ER, urgent care clinic since your last visit?  Hospitalized since your last visit?new patient    2. Have you seen or consulted any other health care providers outside of the [facility] since your last visit?  Include any pap smears or colon screening. New patient  
Sign     Days of Exercise per Week: 3 days     Minutes of Exercise per Session: 30 min   Stress: No Stress Concern Present (5/2/2022)    [place] [employer] - Occupational Stress Questionnaire     Feeling of Stress : Not at all   Social Connections: Unknown (5/2/2022)    Social Connection and Isolation Panel [NHANES]     Frequency of Communication with Friends and Family: Twice a week     Frequency of Social Gatherings with Friends and Family: Twice a week     Attends Sabianist Services: More than 4 times per year     Active Member of Clubs or Organizations: No   Intimate Partner Violence: Not At Risk (5/2/2022)    Humiliation, Afraid, Rape, and Kick questionnaire     Fear of Current or Ex-Partner: No     Emotionally Abused: No     Physically Abused: No     Sexually Abused: No   Housing Stability: Unknown (9/2/2023)    Housing Stability Vital Sign     Unstable Housing in the Last Year: No     Current Outpatient Medications on File Prior to Visit   Medication Sig Dispense Refill    lidocaine-Hydrocort 3-0.5 % cream Place around the anus in the morning and at bedtime 28 g 0    metFORMIN (GLUCOPHAGE-XR) 500 MG extended release tablet TAKE 4 TABLETS BY MOUTH EVERY DAY AS DIRECTED 120 tablet 0    VICTOZA 18 MG/3ML SOPN SC injection ADMINISTER 1.2 MG UNDER THE SKIN DAILY 6 mL 3    blood glucose test strips (FREESTYLE LITE) strip 1 each by In Vitro route 2 times daily As needed. 200 each 3    JARDIANCE 25 MG tablet TAKE 1 TABLET BY MOUTH DAILY 30 tablet 5    losartan-hydroCHLOROthiazide (HYZAAR) 100-25 MG per tablet TAKE 1 TABLET BY MOUTH EVERY DAY 30 tablet 5    insulin glargine, 1 unit dial, (TOUJEO SOLOSTAR) 300 UNIT/ML concentrated injection pen ADMINISTER 25 TO 30 UNITS UNDER THE SKIN AT BEDTIME 7.5 mL 5    B-D UF III MINI PEN NEEDLES 31G X 5 MM MISC USE TO INJECT INSULIN TWICE DAILY 100 each 11    atorvastatin (LIPITOR) 20 MG tablet TAKE 1 TABLET BY MOUTH DAILY 90 tablet 3    blood glucose test

## 2024-03-29 RX ORDER — METFORMIN HYDROCHLORIDE 500 MG/1
TABLET, EXTENDED RELEASE ORAL
Qty: 360 TABLET | OUTPATIENT
Start: 2024-03-29

## 2024-03-30 ENCOUNTER — PATIENT MESSAGE (OUTPATIENT)
Age: 64
End: 2024-03-30

## 2024-03-30 RX ORDER — METFORMIN HYDROCHLORIDE 500 MG/1
TABLET, EXTENDED RELEASE ORAL
Qty: 120 TABLET | Refills: 0 | Status: CANCELLED | OUTPATIENT
Start: 2024-03-30

## 2024-04-01 RX ORDER — METFORMIN HYDROCHLORIDE 500 MG/1
2000 TABLET, EXTENDED RELEASE ORAL
Qty: 360 TABLET | Refills: 1 | Status: SHIPPED | OUTPATIENT
Start: 2024-04-01

## 2024-04-01 NOTE — TELEPHONE ENCOUNTER
From: Jasson Quiñones  To: Dr. Vu Gutiérrez  Sent: 3/30/2024 7:57 AM EDT  Subject: Metformin    The pharmacy, [facility] said they tried to obtain approval for Metformin but did not.    Please approve     Thanks    Jasson Quiñones

## 2024-04-01 NOTE — TELEPHONE ENCOUNTER
Refill request received from [facility] for   Requested Prescriptions     Pending Prescriptions Disp Refills    metFORMIN (GLUCOPHAGE-XR) 500 MG extended release tablet 120 tablet 0     Last office visit: 2/29/2024   Next office visit: 5/16/2024     Routed to Dr Vu Gutiérrez for review.

## 2024-04-04 ENCOUNTER — TRANSCRIBE ORDERS (OUTPATIENT)
Facility: HOSPITAL | Age: 64
End: 2024-04-04

## 2024-04-04 DIAGNOSIS — R93.41 ABNORMAL RADIOLOGIC FINDINGS ON DIAGNOSTIC IMAGING OF RENAL PELVIS, URETER, OR BLADDER: Primary | ICD-10-CM

## 2024-05-02 ENCOUNTER — HOSPITAL ENCOUNTER (OUTPATIENT)
Facility: HOSPITAL | Age: 64
Setting detail: RECURRING SERIES
Discharge: HOME | End: 2024-05-05
Payer: COMMERCIAL

## 2024-05-02 PROCEDURE — 97803 MED NUTRITION INDIV SUBSEQ: CPT

## 2024-05-02 NOTE — PROGRESS NOTES
meal. Pt has not had any trouble with low blood sugar but has gotten the glucose tablets just in case.   Pt complains of irregularity of bowel movements. Pt has been trying prunes, pruneze, and metamucil. We discussed importance of regular fiber intake from fruits and vegetables. Pt noted not hitting the 3 vegetable and 2 fruit servings currently. We also discussed trying to start the day with a hot tea.   Pt exercise routine has been consistent with gym and walking. Pt is enjoying the nice weather and is sometimes able to walk in both morning and evenings.   Pt found old meal plans from starting weight loss and plans to try and go back to those to help encourage greater success with weight loss. Pt is wanting to alternate low and regular carb days in the week. We discussed making sure that he is eating consistent carbs of the gym workout days.     Previous goals:  - pack back up snacks (list provided) in car.   -when cooking, make double portions to freeze 3-4 servings for busy days.     -continue 10k steps daily + gym 4 days per week.  -continue strength training 3-4 times per week   -continue to measure out portions consistently for awareness  -continue to include complex carbohydrates at each meal. Minimum 15g. Recommended 30-40g per meal (15-30g if not counting vegetables).   -continue increase vegetable intake (non-starchy) to 2 cups per day cooked  -continue increase fiber intake from carbohydrate sources by choosing whole grains, beans, starchy vegetables, and whole fruits (higher fiber versions) whenever possible.   -put glucose tablets in car for back up. Discuss freestyle gali with insurance this month (to possibly reduce number of times having to check blood sugars. Noted finger fatigue)     Nutrition Prescription and  Intervention Self monitoring with blood sugar log  Exercise goals. Reviewed srength training. Step tracking on sneha. >10k per day.  Goal setting  Provided support for changes made.

## 2024-05-08 ENCOUNTER — HOSPITAL ENCOUNTER (OUTPATIENT)
Facility: HOSPITAL | Age: 64
Discharge: HOME | End: 2024-05-11
Attending: STUDENT IN AN ORGANIZED HEALTH CARE EDUCATION/TRAINING PROGRAM
Payer: COMMERCIAL

## 2024-05-08 DIAGNOSIS — R93.41 ABNORMAL RADIOLOGIC FINDINGS ON DIAGNOSTIC IMAGING OF RENAL PELVIS, URETER, OR BLADDER: ICD-10-CM

## 2024-05-08 PROCEDURE — 72194 CT PELVIS W/O & W/DYE: CPT

## 2024-05-08 PROCEDURE — 6360000004 HC RX CONTRAST MEDICATION: Performed by: STUDENT IN AN ORGANIZED HEALTH CARE EDUCATION/TRAINING PROGRAM

## 2024-05-08 RX ADMIN — IOPAMIDOL 50 ML: 612 INJECTION, SOLUTION INTRAVENOUS at 11:36

## 2024-05-16 ENCOUNTER — OFFICE VISIT (OUTPATIENT)
Age: 64
End: 2024-05-16
Payer: COMMERCIAL

## 2024-05-16 VITALS
OXYGEN SATURATION: 95 % | TEMPERATURE: 97.6 F | BODY MASS INDEX: 40.43 KG/M2 | DIASTOLIC BLOOD PRESSURE: 80 MMHG | HEART RATE: 60 BPM | RESPIRATION RATE: 14 BRPM | HEIGHT: 66 IN | SYSTOLIC BLOOD PRESSURE: 126 MMHG | WEIGHT: 251.6 LBS

## 2024-05-16 DIAGNOSIS — E11.65 TYPE 2 DIABETES MELLITUS WITH HYPERGLYCEMIA, WITH LONG-TERM CURRENT USE OF INSULIN (HCC): Primary | ICD-10-CM

## 2024-05-16 DIAGNOSIS — Z79.4 TYPE 2 DIABETES MELLITUS WITH HYPERGLYCEMIA, WITH LONG-TERM CURRENT USE OF INSULIN (HCC): Primary | ICD-10-CM

## 2024-05-16 DIAGNOSIS — R22.2 MASS OF BUTTOCK: ICD-10-CM

## 2024-05-16 DIAGNOSIS — E66.01 OBESITY, CLASS III, BMI 40-49.9 (MORBID OBESITY) (HCC): ICD-10-CM

## 2024-05-16 DIAGNOSIS — I10 HYPERTENSION, UNSPECIFIED TYPE: ICD-10-CM

## 2024-05-16 LAB — HBA1C MFR BLD: 6.8 %

## 2024-05-16 PROCEDURE — 3074F SYST BP LT 130 MM HG: CPT | Performed by: INTERNAL MEDICINE

## 2024-05-16 PROCEDURE — 99214 OFFICE O/P EST MOD 30 MIN: CPT | Performed by: INTERNAL MEDICINE

## 2024-05-16 PROCEDURE — 3078F DIAST BP <80 MM HG: CPT | Performed by: INTERNAL MEDICINE

## 2024-05-16 PROCEDURE — 3044F HG A1C LEVEL LT 7.0%: CPT | Performed by: INTERNAL MEDICINE

## 2024-05-16 PROCEDURE — 83036 HEMOGLOBIN GLYCOSYLATED A1C: CPT | Performed by: INTERNAL MEDICINE

## 2024-05-17 ENCOUNTER — PATIENT MESSAGE (OUTPATIENT)
Age: 64
End: 2024-05-17

## 2024-05-17 DIAGNOSIS — E11.65 TYPE 2 DIABETES MELLITUS WITH HYPERGLYCEMIA, WITH LONG-TERM CURRENT USE OF INSULIN (HCC): Primary | ICD-10-CM

## 2024-05-17 DIAGNOSIS — Z79.4 TYPE 2 DIABETES MELLITUS WITH HYPERGLYCEMIA, WITH LONG-TERM CURRENT USE OF INSULIN (HCC): Primary | ICD-10-CM

## 2024-05-17 NOTE — PROGRESS NOTES
Chief Complaint   Patient presents with    Follow-up     Would not discuss with me    This is a 63-year-old black male diabetic hypertensive and working hard to get his diabetes under control working with nutrition of his he unfortunately has had a hard time getting his GLP medication he is long-term user of Victoza and never wanted to switch we try to get his dose up to 1.8 but he was never able to give enough Victoza to do that is most of the time he uses 1.2 but lately because of shortages he is using 0.6 daily at least getting some effect he thinks his diabetes control is pretty good he has been monitoring it and and and checking his sugars on a regular basis we reviewed his sugars had an episode of chest pain cardiac workup been seen urology and question of mass in his bladder he is also had a buttock mass noted and on CT of his pelvis they noticed this 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  Patient has no pain in the area he is just concerned because people keep talking about worries and issues.    Reasonably active diet is pretty good does not get 7000 steps a day  CT Result (most recent):  CT PELVIS W WO IV CONTRAST 05/08/2024    Narrative  EXAM: CT PELVIS W WO intravesical CONTRAST    INDICATION: Abnormal radiologic findings on diagnostic imaging of renal pelvis,  ureter, or bladder    COMPARISON: None.    Intravenous CONTRAST: None.    Intravesical CONTRAST: 225 mL of a mixture containing Isovue 300 and normal  saline    TECHNIQUE:  Multislice helical CT of the pelvis was performed from the iliac crest to the  symphysis pubis prior to and following intravesical contrast administration. No  IV or oral contrast was administered. Coronal and sagittal reformations were  generated.  CT dose reduction was achieved

## 2024-05-21 RX ORDER — ACYCLOVIR 400 MG/1
TABLET ORAL
Qty: 1 EACH | Refills: 0 | Status: SHIPPED | OUTPATIENT
Start: 2024-05-21

## 2024-05-21 RX ORDER — ACYCLOVIR 400 MG/1
TABLET ORAL
Qty: 9 EACH | Refills: 3 | Status: SHIPPED | OUTPATIENT
Start: 2024-05-21

## 2024-05-21 NOTE — TELEPHONE ENCOUNTER
From: Jasson Quiñones  To: Dr. Vu Gutiérrez  Sent: 5/17/2024 3:22 PM EDT  Subject: Dexcom CGM     Dr. Gutiérrez  is this device something you think I could benefit from?    Jasson Quiñones

## 2024-06-04 ENCOUNTER — HOSPITAL ENCOUNTER (OUTPATIENT)
Age: 64
Discharge: HOME | End: 2024-06-07
Payer: COMMERCIAL

## 2024-06-04 DIAGNOSIS — R22.2 MASS OF BUTTOCK: ICD-10-CM

## 2024-06-04 PROCEDURE — 6360000004 HC RX CONTRAST MEDICATION: Performed by: RADIOLOGY

## 2024-06-04 PROCEDURE — 72197 MRI PELVIS W/O & W/DYE: CPT

## 2024-06-04 PROCEDURE — A9579 GAD-BASE MR CONTRAST NOS,1ML: HCPCS | Performed by: RADIOLOGY

## 2024-06-04 RX ADMIN — GADOTERIDOL 20 ML: 279.3 INJECTION, SOLUTION INTRAVENOUS at 13:24

## 2024-06-11 ENCOUNTER — HOSPITAL ENCOUNTER (OUTPATIENT)
Facility: HOSPITAL | Age: 64
Setting detail: RECURRING SERIES
Discharge: HOME | End: 2024-06-14
Payer: COMMERCIAL

## 2024-06-11 PROCEDURE — 97803 MED NUTRITION INDIV SUBSEQ: CPT

## 2024-06-11 NOTE — PROGRESS NOTES
NUTRITION - FOLLOW-UP TREATMENT NOTE  Patient Name: Jasson Quiñones         Date: 2024  : 1960    YES Patient  Verified  Diagnosis:   E66.01 (ICD-10-CM) - Morbid obesity (HCC)        E11.65,Z79.4 (ICD-10-CM) - Type 2 diabetes mellitus with hyperglycemia, with long-term current use of insulin (HCC)      In time:   1055am   Out time: 1120am   Total Treatment Time (min):   25     SUBJECTIVE/ASSESSMENT  Current Wt: 250 Previous Wt: 252.2 Wt Change: -2.2     Initial Wt: 278 (PCP) Total Wt change: -28 Height: 66     Changes in medication or medical history? Any new allergies, surgeries or procedures?    Yes   If yes, update Summary List   Hemoglobin A1C   Date Value Ref Range Status   2024 6.4 (H) 4.0 - 5.6 % Final     Comment:     (NOTE)  HbA1C Interpretive Ranges  <5.7              Normal  5.7 - 6.4         Consider Prediabetes  >6.5              Consider Diabetes       A1C, POC 6.8 (2024)     Nutrition Diagnosis        Diagnosis Status: Excessive energy intake R/T not planning for meals ahead of time AEB unintended weight gain of 4# over past month, notes when not having a food plan would grab whatever was convenient. Higher calorie food choices.   [x]  Improved []  No Change    []  Declined   []  Discontinued      Food and nutrition related knowledge deficit R./T lack of prior education for treating low blood  sugars AEB report of consuming peanut butter for low blood sugar last week.    [x]   Improved  []   No Change    []   Declined   []   Discontinued     Food and nutrition related knowledge deficit R/T lack of prior education for healthy weight loss with diabetes AEB Request for counseling.    [x]   Improved  []   No Change    []   Declined   []   Discontinued                 Nutrition Monitoring and Evaluation: Pt seen today for follow up visit.   Pt has done very well. Pt has a new dexcom device and has been using it over the past 3 weeks. Pt concerned with waking up at 3-4am with low

## 2024-06-27 NOTE — PROGRESS NOTES
Refill request received from [facility] for   Requested Prescriptions     Pending Prescriptions Disp Refills    losartan-hydroCHLOROthiazide (HYZAAR) 100-25 MG per tablet 30 tablet 5     Sig: Take 1 tablet by mouth daily     Last office visit: 5/16/2024   Next office visit: 9/17/2024     Routed to Dr Vu Gutiérrez for review.         Ginger Rodas Cma

## 2024-06-27 NOTE — TELEPHONE ENCOUNTER
Refill request received from [facility] for   Requested Prescriptions     Pending Prescriptions Disp Refills    losartan-hydroCHLOROthiazide (HYZAAR) 100-25 MG per tablet [Pharmacy Med Name: LOSARTAN/HCTZ 100/25MG TABLETS] 90 tablet      Sig: TAKE 1 TABLET BY MOUTH EVERY DAY     Last office visit: 5/16/2024   Next office visit: 9/17/2024     Routed to Dr Vu Gutiérrez for review.         Ginger Rodas Cma

## 2024-06-28 RX ORDER — LOSARTAN POTASSIUM AND HYDROCHLOROTHIAZIDE 25; 100 MG/1; MG/1
TABLET ORAL
Qty: 90 TABLET | Refills: 3 | Status: SHIPPED | OUTPATIENT
Start: 2024-06-28

## 2024-06-28 RX ORDER — LOSARTAN POTASSIUM AND HYDROCHLOROTHIAZIDE 25; 100 MG/1; MG/1
TABLET ORAL
Qty: 30 TABLET | Refills: 5 | OUTPATIENT
Start: 2024-06-28

## 2024-06-28 RX ORDER — EMPAGLIFLOZIN 25 MG/1
TABLET, FILM COATED ORAL
Qty: 30 TABLET | Refills: 5 | Status: SHIPPED | OUTPATIENT
Start: 2024-06-28

## 2024-07-09 ENCOUNTER — TELEPHONE (OUTPATIENT)
Age: 64
End: 2024-07-09

## 2024-07-09 NOTE — TELEPHONE ENCOUNTER
Pt having a difficult time getting an appointment to see Dr Manzanares, Orthopedic oncologist at [facility].  I tried calling ([phone]) and was sent straight to the voice mail for the offices assistant.  Left message for them to return my call to help this patient get scheduled.    Alex VALDEZ LPN

## 2024-07-11 ENCOUNTER — TELEPHONE (OUTPATIENT)
Age: 64
End: 2024-07-11

## 2024-07-11 NOTE — TELEPHONE ENCOUNTER
As per Tuesday P,  [facility] called to inform Dr that pt has an appt on Monday with Dr Manzanares for Buttocks Mass    Emiliecarolyn Vitale, JUANISN

## 2024-07-29 RX ORDER — ATORVASTATIN CALCIUM 20 MG/1
20 TABLET, FILM COATED ORAL DAILY
Qty: 90 TABLET | Refills: 3 | Status: SHIPPED | OUTPATIENT
Start: 2024-07-29

## 2024-07-30 ENCOUNTER — HOSPITAL ENCOUNTER (OUTPATIENT)
Facility: HOSPITAL | Age: 64
Setting detail: RECURRING SERIES
Discharge: HOME | End: 2024-08-02
Payer: COMMERCIAL

## 2024-07-30 PROCEDURE — 97803 MED NUTRITION INDIV SUBSEQ: CPT | Performed by: DIETITIAN, REGISTERED

## 2024-07-30 RX ORDER — LIRAGLUTIDE 6 MG/ML
INJECTION SUBCUTANEOUS
Qty: 6 ML | Refills: 3 | Status: SHIPPED | OUTPATIENT
Start: 2024-07-30

## 2024-07-30 NOTE — PROGRESS NOTES
NUTRITION - FOLLOW-UP TREATMENT NOTE  Patient Name: Jasson Quiñones         Date: 2024  : 1960    YES Patient  Verified  Diagnosis:   E66.01 (ICD-10-CM) - Morbid obesity (HCC)        E11.65,Z79.4 (ICD-10-CM) - Type 2 diabetes mellitus with hyperglycemia, with long-term current use of insulin (HCC)      In time:   11:05am             Out time:   11:35am   Total Treatment Time (min):   30     SUBJECTIVE/ASSESSMENT  Current Wt: 247.4 Previous Wt: 250  Wt Change: -2.6     Initial Wt: 278 (PCP)  Total Wt change: -30.6 Height: 66      Changes in medication or medical history? Any new allergies, surgeries or procedures?    No    If yes, update Summary List   20 units at night since May.    Hemoglobin A1C, POC   Date Value Ref Range Status   2024 6.8 % Final          Nutrition Diagnosis        Diagnosis Status:   Excessive energy intake R/T not planning for meals ahead of time AEB unintended weight gain of 4# over past month, notes when not having a food plan would grab whatever was convenient. Higher calorie food choices.   [x]  Improved []  No Change    []  Declined   [x]  Discontinued       Food and nutrition related knowledge deficit R./T lack of prior education for treating low blood  sugars AEB report of consuming peanut butter for low blood sugar last week.    [x]   Improved  []   No Change    []   Declined   []   Discontinued      Food and nutrition related knowledge deficit R/T lack of prior education for healthy weight loss with diabetes AEB Request for counseling.   [x]  Improved []  No Change    []  Declined   []  Discontinued        Nutrition Monitoring and Evaluation: Now has a dexcom continuous meter.   Some low blood sugars.   Has had a few highs and is now more aware and intentional of choices when eating out.   Does better when cooking at home. Vs eating out.     Interested in getting a .     Nothing coming up that may be a barrier.   Surgery coming up in August.     Previous

## 2024-08-20 ENCOUNTER — HOSPITAL ENCOUNTER (OUTPATIENT)
Facility: HOSPITAL | Age: 64
Setting detail: RECURRING SERIES
Discharge: HOME | End: 2024-08-23
Payer: COMMERCIAL

## 2024-08-20 PROCEDURE — 97803 MED NUTRITION INDIV SUBSEQ: CPT | Performed by: DIETITIAN, REGISTERED

## 2024-08-20 NOTE — PROGRESS NOTES
NUTRITION - FOLLOW-UP TREATMENT NOTE  Patient Name: Jasson Quiñones         Date: 2024  : 1960    YES Patient  Verified  Diagnosis:   E66.01 (ICD-10-CM) - Morbid obesity (HCC)        E11.65,Z79.4 (ICD-10-CM) - Type 2 diabetes mellitus with hyperglycemia, with long-term current use of insulin (HCC)      In time:   10:00am             Out time:   10:30am   Total Treatment Time (min):   30     SUBJECTIVE/ASSESSMENT  Current Wt: 246.0 Previous Wt: 247.4  Wt Change: -1.4     Initial Wt: 278 (PCP)  Total Wt change: -32 Height: 66      Changes in medication or medical history? Any new allergies, surgeries or procedures?    No    If yes, update Summary List   20 units at night since May.    Hemoglobin A1C, POC   Date Value Ref Range Status   2024 6.8 % Final          Nutrition Diagnosis        Diagnosis Status:   Excessive energy intake R/T not planning for meals ahead of time AEB unintended weight gain of 4# over past month, notes when not having a food plan would grab whatever was convenient. Higher calorie food choices.   [x]  Improved []  No Change    []  Declined   [x]  Discontinued       Food and nutrition related knowledge deficit R./T lack of prior education for treating low blood  sugars AEB report of consuming peanut butter for low blood sugar last week.    [x]   Improved  []   No Change    []   Declined   []   Discontinued      Food and nutrition related knowledge deficit R/T lack of prior education for healthy weight loss with diabetes AEB Request for counseling.   [x]  Improved []  No Change    []  Declined   []  Discontinued        Nutrition Monitoring and Evaluation: dexcom continuous meter.   Some low blood sugars.   91% time in range.     Going to the gym more. Lifting weights more. Has noticed that blood sugars will go up a slight amount.   No concerns with surgery.     [place] trip coming up. Plan: own snacks (nuts and fruit, oatmeal, continue exercising, if eating out  eating a

## 2024-09-17 ENCOUNTER — OFFICE VISIT (OUTPATIENT)
Age: 64
End: 2024-09-17
Payer: COMMERCIAL

## 2024-09-17 VITALS
HEIGHT: 66 IN | SYSTOLIC BLOOD PRESSURE: 114 MMHG | TEMPERATURE: 97.8 F | BODY MASS INDEX: 40.18 KG/M2 | RESPIRATION RATE: 18 BRPM | HEART RATE: 64 BPM | WEIGHT: 250 LBS | DIASTOLIC BLOOD PRESSURE: 62 MMHG | OXYGEN SATURATION: 97 %

## 2024-09-17 DIAGNOSIS — Z00.00 ENCOUNTER FOR WELL ADULT EXAM WITHOUT ABNORMAL FINDINGS: ICD-10-CM

## 2024-09-17 DIAGNOSIS — E78.00 PURE HYPERCHOLESTEROLEMIA: ICD-10-CM

## 2024-09-17 DIAGNOSIS — Z79.4 TYPE 2 DIABETES MELLITUS WITH HYPERGLYCEMIA, WITH LONG-TERM CURRENT USE OF INSULIN (HCC): ICD-10-CM

## 2024-09-17 DIAGNOSIS — Z29.11 NEED FOR PROPHYLACTIC VACCINATION AND INOCULATION AGAINST RESPIRATORY SYNCYTIAL VIRUS (RSV): ICD-10-CM

## 2024-09-17 DIAGNOSIS — Z00.00 PREVENTATIVE HEALTH CARE: Primary | ICD-10-CM

## 2024-09-17 DIAGNOSIS — E11.65 TYPE 2 DIABETES MELLITUS WITH HYPERGLYCEMIA, WITH LONG-TERM CURRENT USE OF INSULIN (HCC): ICD-10-CM

## 2024-09-17 DIAGNOSIS — I10 HYPERTENSION, UNSPECIFIED TYPE: ICD-10-CM

## 2024-09-17 DIAGNOSIS — Z00.00 PREVENTATIVE HEALTH CARE: ICD-10-CM

## 2024-09-17 LAB
ALBUMIN SERPL-MCNC: 3.4 G/DL (ref 3.5–5)
ALBUMIN/GLOB SERPL: 1.1 (ref 1.1–2.2)
ALP SERPL-CCNC: 74 U/L (ref 45–117)
ALT SERPL-CCNC: 22 U/L (ref 12–78)
ANION GAP SERPL CALC-SCNC: 6 MMOL/L (ref 2–12)
AST SERPL-CCNC: 24 U/L (ref 15–37)
BASOPHILS # BLD: 0.1 K/UL (ref 0–0.1)
BASOPHILS NFR BLD: 1 % (ref 0–1)
BILIRUB SERPL-MCNC: 0.4 MG/DL (ref 0.2–1)
BUN SERPL-MCNC: 19 MG/DL (ref 6–20)
BUN/CREAT SERPL: 23 (ref 12–20)
CALCIUM SERPL-MCNC: 9 MG/DL (ref 8.5–10.1)
CHLORIDE SERPL-SCNC: 109 MMOL/L (ref 97–108)
CHOLEST SERPL-MCNC: 138 MG/DL
CO2 SERPL-SCNC: 24 MMOL/L (ref 21–32)
CREAT SERPL-MCNC: 0.83 MG/DL (ref 0.7–1.3)
DIFFERENTIAL METHOD BLD: ABNORMAL
EOSINOPHIL # BLD: 0.2 K/UL (ref 0–0.4)
EOSINOPHIL NFR BLD: 3 % (ref 0–7)
ERYTHROCYTE [DISTWIDTH] IN BLOOD BY AUTOMATED COUNT: 15.9 % (ref 11.5–14.5)
EST. AVERAGE GLUCOSE BLD GHB EST-MCNC: 143 MG/DL
GLOBULIN SER CALC-MCNC: 3 G/DL (ref 2–4)
GLUCOSE SERPL-MCNC: 102 MG/DL (ref 65–100)
HBA1C MFR BLD: 6.6 % (ref 4–5.6)
HCT VFR BLD AUTO: 41.9 % (ref 36.6–50.3)
HDLC SERPL-MCNC: 73 MG/DL
HDLC SERPL: 1.9 (ref 0–5)
HGB BLD-MCNC: 13.1 G/DL (ref 12.1–17)
IMM GRANULOCYTES # BLD AUTO: 0 K/UL (ref 0–0.04)
IMM GRANULOCYTES NFR BLD AUTO: 0 % (ref 0–0.5)
LDLC SERPL CALC-MCNC: 54.8 MG/DL (ref 0–100)
LYMPHOCYTES # BLD: 1.9 K/UL (ref 0.8–3.5)
LYMPHOCYTES NFR BLD: 31 % (ref 12–49)
MCH RBC QN AUTO: 26.5 PG (ref 26–34)
MCHC RBC AUTO-ENTMCNC: 31.3 G/DL (ref 30–36.5)
MCV RBC AUTO: 84.6 FL (ref 80–99)
MONOCYTES # BLD: 0.7 K/UL (ref 0–1)
MONOCYTES NFR BLD: 11 % (ref 5–13)
NEUTS SEG # BLD: 3.2 K/UL (ref 1.8–8)
NEUTS SEG NFR BLD: 54 % (ref 32–75)
NRBC # BLD: 0 K/UL (ref 0–0.01)
NRBC BLD-RTO: 0 PER 100 WBC
PLATELET # BLD AUTO: 256 K/UL (ref 150–400)
PMV BLD AUTO: 11.4 FL (ref 8.9–12.9)
POTASSIUM SERPL-SCNC: 4.2 MMOL/L (ref 3.5–5.1)
PROT SERPL-MCNC: 6.4 G/DL (ref 6.4–8.2)
PSA SERPL-MCNC: 4.8 NG/ML (ref 0.01–4)
RBC # BLD AUTO: 4.95 M/UL (ref 4.1–5.7)
SODIUM SERPL-SCNC: 139 MMOL/L (ref 136–145)
TRIGL SERPL-MCNC: 51 MG/DL
VLDLC SERPL CALC-MCNC: 10.2 MG/DL
WBC # BLD AUTO: 5.9 K/UL (ref 4.1–11.1)

## 2024-09-17 PROCEDURE — 3074F SYST BP LT 130 MM HG: CPT | Performed by: INTERNAL MEDICINE

## 2024-09-17 PROCEDURE — 3044F HG A1C LEVEL LT 7.0%: CPT | Performed by: INTERNAL MEDICINE

## 2024-09-17 PROCEDURE — 3078F DIAST BP <80 MM HG: CPT | Performed by: INTERNAL MEDICINE

## 2024-09-17 PROCEDURE — 99213 OFFICE O/P EST LOW 20 MIN: CPT | Performed by: INTERNAL MEDICINE

## 2024-09-17 RX ORDER — RESPIRATORY SYNCYTIAL VISUS VACCINE RECOMBINANT, ADJUVANTED 120MCG/0.5
0.5 KIT INTRAMUSCULAR ONCE
Qty: 0.5 ML | Refills: 0 | Status: SHIPPED | OUTPATIENT
Start: 2024-09-17 | End: 2024-09-17

## 2024-09-17 SDOH — ECONOMIC STABILITY: INCOME INSECURITY: HOW HARD IS IT FOR YOU TO PAY FOR THE VERY BASICS LIKE FOOD, HOUSING, MEDICAL CARE, AND HEATING?: NOT VERY HARD

## 2024-09-17 SDOH — ECONOMIC STABILITY: FOOD INSECURITY: WITHIN THE PAST 12 MONTHS, YOU WORRIED THAT YOUR FOOD WOULD RUN OUT BEFORE YOU GOT MONEY TO BUY MORE.: NEVER TRUE

## 2024-09-17 SDOH — ECONOMIC STABILITY: FOOD INSECURITY: WITHIN THE PAST 12 MONTHS, THE FOOD YOU BOUGHT JUST DIDN'T LAST AND YOU DIDN'T HAVE MONEY TO GET MORE.: NEVER TRUE

## 2024-09-18 PROBLEM — Z86.0100 PERSONAL HISTORY OF COLONIC POLYPS: Status: ACTIVE | Noted: 2023-10-31

## 2024-09-18 PROBLEM — Z86.010 PERSONAL HISTORY OF COLONIC POLYPS: Status: ACTIVE | Noted: 2023-10-31

## 2024-09-18 PROBLEM — E11.3411 SEVERE NONPROLIFERATIVE DIABETIC RETINOPATHY OF RIGHT EYE WITH MACULAR EDEMA ASSOCIATED WITH TYPE 2 DIABETES MELLITUS (HCC): Status: ACTIVE | Noted: 2024-09-18

## 2024-09-18 PROBLEM — R97.20 ABNORMAL PROSTATE SPECIFIC ANTIGEN (PSA): Status: ACTIVE | Noted: 2020-12-08

## 2024-09-27 RX ORDER — METFORMIN HCL 500 MG
TABLET, EXTENDED RELEASE 24 HR ORAL
Qty: 360 TABLET | Refills: 1 | Status: SHIPPED | OUTPATIENT
Start: 2024-09-27

## 2024-10-08 ENCOUNTER — HOSPITAL ENCOUNTER (OUTPATIENT)
Facility: HOSPITAL | Age: 64
Setting detail: RECURRING SERIES
Discharge: HOME | End: 2024-10-11
Payer: COMMERCIAL

## 2024-10-08 PROCEDURE — 97803 MED NUTRITION INDIV SUBSEQ: CPT | Performed by: DIETITIAN, REGISTERED

## 2024-10-08 NOTE — PROGRESS NOTES
NUTRITION - FOLLOW-UP TREATMENT NOTE  Patient Name: Jasson Quiñones         Date: 10/8/2024  : 1960    YES Patient  Verified  Diagnosis:   E66.01 (ICD-10-CM) - Morbid obesity (HCC)        E11.65,Z79.4 (ICD-10-CM) - Type 2 diabetes mellitus with hyperglycemia, with long-term current use of insulin (HCC)      In time:   10:00am             Out time:   10:30am   Total Treatment Time (min):   30     SUBJECTIVE/ASSESSMENT  Current Wt: 244.6 Previous Wt: 246.0 Wt Change: -1.4     Initial Wt: 278 (PCP)  Total Wt change: -33.4 Height: 66      Changes in medication or medical history? Any new allergies, surgeries or procedures?    No    If yes, update Summary List   15 units at night.     Hemoglobin A1C   Date Value Ref Range Status   2024 6.6 (H) 4.0 - 5.6 % Final     Comment:     (NOTE)  HbA1C Interpretive Ranges  <5.7              Normal  5.7 - 6.4         Consider Prediabetes  >6.5              Consider Diabetes       Hemoglobin A1C, POC   Date Value Ref Range Status   2024 6.8 % Final     BP Readings from Last 3 Encounters:   24 114/62   24 126/80   24 138/78          Nutrition Diagnosis        Diagnosis Status:   Excessive energy intake R/T not planning for meals ahead of time AEB unintended weight gain of 4# over past month, notes when not having a food plan would grab whatever was convenient. Higher calorie food choices.   [x]  Improved []  No Change    []  Declined   [x]  Discontinued       Food and nutrition related knowledge deficit R./T lack of prior education for treating low blood  sugars AEB report of consuming peanut butter for low blood sugar last week.    [x]   Improved  []   No Change    []   Declined   []   Discontinued      Food and nutrition related knowledge deficit R/T lack of prior education for healthy weight loss with diabetes AEB Request for counseling.   [x]  Improved []  No Change    []  Declined   []  Discontinued        Nutrition Monitoring and

## 2024-10-24 ENCOUNTER — TELEPHONE (OUTPATIENT)
Age: 64
End: 2024-10-24

## 2024-10-24 NOTE — TELEPHONE ENCOUNTER
----- Message from Malabar BANDAR sent at 10/24/2024  3:43 PM EDT -----  Regarding: ECC Appointment Request  ECC Appointment Request    Patient needs appointment for ECC Appointment Type: Existing Condition Follow Up.    Patient Requested Dates(s):February 4, 2025  Patient Requested Time:7-8 morning   Provider Name:Vu Gutiérrez MD    Reason for Appointment Request: Established Patient - No appointments available during search, Patient said that his doctor is leaving the Practice and he needs a follow up around February.  --------------------------------------------------------------------------------------------------------------------------    Relationship to Patient: Self     Call Back Information: OK to leave message on voicemail  Preferred Call Back Number: Phone

## 2024-11-18 SDOH — HEALTH STABILITY: PHYSICAL HEALTH: ON AVERAGE, HOW MANY MINUTES DO YOU ENGAGE IN EXERCISE AT THIS LEVEL?: 70 MIN

## 2024-11-18 SDOH — HEALTH STABILITY: PHYSICAL HEALTH: ON AVERAGE, HOW MANY DAYS PER WEEK DO YOU ENGAGE IN MODERATE TO STRENUOUS EXERCISE (LIKE A BRISK WALK)?: 7 DAYS

## 2024-11-21 ENCOUNTER — OFFICE VISIT (OUTPATIENT)
Dept: PRIMARY CARE CLINIC | Facility: CLINIC | Age: 64
End: 2024-11-21
Payer: COMMERCIAL

## 2024-11-21 VITALS
BODY MASS INDEX: 39.63 KG/M2 | DIASTOLIC BLOOD PRESSURE: 78 MMHG | SYSTOLIC BLOOD PRESSURE: 146 MMHG | HEIGHT: 66 IN | WEIGHT: 246.6 LBS | HEART RATE: 59 BPM | TEMPERATURE: 98.5 F

## 2024-11-21 DIAGNOSIS — E66.01 MORBID OBESITY: ICD-10-CM

## 2024-11-21 DIAGNOSIS — E11.3411 SEVERE NONPROLIFERATIVE DIABETIC RETINOPATHY OF RIGHT EYE WITH MACULAR EDEMA ASSOCIATED WITH TYPE 2 DIABETES MELLITUS (HCC): Primary | ICD-10-CM

## 2024-11-21 DIAGNOSIS — M54.50 ACUTE LEFT-SIDED LOW BACK PAIN WITHOUT SCIATICA: ICD-10-CM

## 2024-11-21 PROCEDURE — 99204 OFFICE O/P NEW MOD 45 MIN: CPT | Performed by: FAMILY MEDICINE

## 2024-11-21 PROCEDURE — 3078F DIAST BP <80 MM HG: CPT | Performed by: FAMILY MEDICINE

## 2024-11-21 PROCEDURE — 3077F SYST BP >= 140 MM HG: CPT | Performed by: FAMILY MEDICINE

## 2024-11-21 PROCEDURE — 3044F HG A1C LEVEL LT 7.0%: CPT | Performed by: FAMILY MEDICINE

## 2024-11-21 RX ORDER — CYCLOBENZAPRINE HCL 10 MG
10 TABLET ORAL 3 TIMES DAILY PRN
Qty: 30 TABLET | Refills: 0 | Status: SHIPPED | OUTPATIENT
Start: 2024-11-21 | End: 2024-12-01

## 2024-11-21 RX ORDER — IBUPROFEN 800 MG/1
800 TABLET, FILM COATED ORAL
Qty: 30 TABLET | Refills: 0 | Status: SHIPPED | OUTPATIENT
Start: 2024-11-21

## 2024-11-21 NOTE — PROGRESS NOTES
Subjective  Chief Complaint   Patient presents with    New Patient     Establish/ left lower back     HPI:  Jasson Quiñones is a 64 y.o. male.    Patient presents to establish with new PCP due to snf of previous PCP.    Diabetes-control improved greatly over the last 2 years.  Patient has improved his diet and activity level.  He has lost 16 pounds over the last 8 months.  His eventual goal is to reduce the number of medications he takes.    Diabetic retinopathy-reports he was seen by ophthalmology in October 2024.    Lumbar strain-patient reports distant history of injury to lumbar back.  He was bending over to clean something off of his foot last week and reports sudden sharp pain left lower lumbar area.  He has been taking 400 mg of ibuprofen occasionally as well as Tylenol with no improvement.  Reports pain has been stable for the last week.    Past Medical History:   Diagnosis Date    Diabetes (HCC)     Hypercholesterolemia     Hypertension     Morbid obesity     Proteinuria      Current Outpatient Medications on File Prior to Visit   Medication Sig Dispense Refill    metFORMIN (GLUCOPHAGE-XR) 500 MG extended release tablet TAKE 4 TABLETS BY MOUTH DAILY WITH BREAKFAST 360 tablet 1    Liraglutide (VICTOZA) 18 MG/3ML SOPN SC injection ADMINISTER 1.2 MG UNDER THE SKIN DAILY 6 mL 3    atorvastatin (LIPITOR) 20 MG tablet TAKE 1 TABLET BY MOUTH DAILY 90 tablet 3    JARDIANCE 25 MG tablet TAKE 1 TABLET BY MOUTH DAILY 30 tablet 5    losartan-hydroCHLOROthiazide (HYZAAR) 100-25 MG per tablet TAKE 1 TABLET BY MOUTH EVERY DAY 90 tablet 3    Continuous Glucose Sensor (DEXCOM G7 SENSOR) MISC Use every 10 days 9 each 3    Continuous Glucose  (DEXCOM G7 ) MICAH Use continuously 1 each 0    insulin glargine, 1 unit dial, (TOUJEO SOLOSTAR) 300 UNIT/ML concentrated injection pen ADMINISTER 25 TO 30 UNITS UNDER THE SKIN AT BEDTIME (Patient taking differently: 15 Units nightly ADMINISTER 25 TO 30 UNITS

## 2024-11-21 NOTE — ASSESSMENT & PLAN NOTE
Patient has lost 16 pounds over the last 8 months.  Encouraged him to continue his current lifestyle modifications.

## 2024-11-21 NOTE — PROGRESS NOTES
\"Have you been to the ER, urgent care clinic since your last visit?  Hospitalized since your last visit?\"    NO    “Have you seen or consulted any other health care providers outside our system since your last visit?”    NO      “Have you had a diabetic eye exam?”    YES - Where: [facility]  10/15/2024    Date of last diabetic eye exam: 2/15/2023

## 2024-11-21 NOTE — ASSESSMENT & PLAN NOTE
Monitored by specialist- no acute findings meriting change in the plan.  Last seen by Optho in October 2024

## 2024-12-02 RX ORDER — FLURBIPROFEN SODIUM 0.3 MG/ML
SOLUTION/ DROPS OPHTHALMIC
Qty: 100 EACH | Refills: 11 | Status: SHIPPED | OUTPATIENT
Start: 2024-12-02

## 2024-12-08 DIAGNOSIS — Z79.4 TYPE 2 DIABETES MELLITUS WITH HYPERGLYCEMIA, WITH LONG-TERM CURRENT USE OF INSULIN (HCC): ICD-10-CM

## 2024-12-08 DIAGNOSIS — E11.65 TYPE 2 DIABETES MELLITUS WITH HYPERGLYCEMIA, WITH LONG-TERM CURRENT USE OF INSULIN (HCC): ICD-10-CM

## 2024-12-09 RX ORDER — INSULIN GLARGINE 300 U/ML
15 INJECTION, SOLUTION SUBCUTANEOUS NIGHTLY
Qty: 7.5 ML | Refills: 0 | Status: SHIPPED | OUTPATIENT
Start: 2024-12-09

## 2024-12-17 ENCOUNTER — PATIENT MESSAGE (OUTPATIENT)
Dept: PRIMARY CARE CLINIC | Facility: CLINIC | Age: 64
End: 2024-12-17

## 2024-12-23 RX ORDER — EMPAGLIFLOZIN 25 MG/1
TABLET, FILM COATED ORAL
Qty: 90 TABLET | Refills: 1 | Status: SHIPPED | OUTPATIENT
Start: 2024-12-23

## 2024-12-23 NOTE — TELEPHONE ENCOUNTER
Refill request received from [facility] for   Requested Prescriptions     Pending Prescriptions Disp Refills    JARDIANCE 25 MG tablet [Pharmacy Med Name: JARDIANCE 25MG TABLETS] 30 tablet 5     Sig: TAKE 1 TABLET BY MOUTH DAILY     Last office visit: 9/17/2024   Next office visit: Visit date not found     Routed to Dr Vu Gutiérrez for review.

## 2024-12-31 ENCOUNTER — HOSPITAL ENCOUNTER (OUTPATIENT)
Facility: HOSPITAL | Age: 64
Setting detail: RECURRING SERIES
Discharge: HOME | End: 2025-01-03
Payer: COMMERCIAL

## 2024-12-31 PROCEDURE — 97803 MED NUTRITION INDIV SUBSEQ: CPT | Performed by: DIETITIAN, REGISTERED

## 2024-12-31 NOTE — PROGRESS NOTES
NUTRITION - FOLLOW-UP TREATMENT NOTE  Patient Name: Jasson Quiñones         Date: 2024  : 1960    YES Patient  Verified  Diagnosis:   E66.01 (ICD-10-CM) - Morbid obesity (HCC)        E11.65,Z79.4 (ICD-10-CM) - Type 2 diabetes mellitus with hyperglycemia, with long-term current use of insulin (HCC)      In time:  1:30pm           Out time:   2:00pm   Total Treatment Time (min):   30     SUBJECTIVE/ASSESSMENT  Current Wt: 244.8 Previous Wt: 244.6 Wt Change: +0.2     Initial Wt: 278 (PCP)  Total Wt change: -33.2 Height: 66      Changes in medication or medical history? Any new allergies, surgeries or procedures?    No    If yes, update Summary List     Hemoglobin A1C   Date Value Ref Range Status   2024 6.6 (H) 4.0 - 5.6 % Final     Comment:     (NOTE)  HbA1C Interpretive Ranges  <5.7              Normal  5.7 - 6.4         Consider Prediabetes  >6.5              Consider Diabetes       Hemoglobin A1C, POC   Date Value Ref Range Status   2024 6.8 % Final     BP Readings from Last 3 Encounters:   24 (!) 146/78   24 114/62   24 126/80     Current Outpatient Medications   Medication Instructions    aspirin 81 mg, Oral, DAILY    atorvastatin (LIPITOR) 20 mg, Oral, DAILY    B-D UF III MINI PEN NEEDLES 31G X 5 MM MISC USE TWICE DAILY    Continuous Glucose  (DEXCOM G7 ) MICAH Use continuously    Continuous Glucose Sensor (DEXCOM G7 SENSOR) MISC Use every 10 days    empagliflozin (JARDIANCE) 25 MG tablet TAKE 1 TABLET BY MOUTH DAILY    ibuprofen (ADVIL;MOTRIN) 800 mg, Oral, 3 TIMES DAILY WITH MEALS    Liraglutide (VICTOZA) 18 MG/3ML SOPN SC injection ADMINISTER 1.2 MG UNDER THE SKIN DAILY    losartan-hydroCHLOROthiazide (HYZAAR) 100-25 MG per tablet TAKE 1 TABLET BY MOUTH EVERY DAY    metFORMIN (GLUCOPHAGE-XR) 500 MG extended release tablet TAKE 4 TABLETS BY MOUTH DAILY WITH BREAKFAST    Toujeo SoloStar 15 Units, SubCUTAneous, NIGHTLY, ADMINISTER 25 TO 30 UNITS

## 2025-02-07 ENCOUNTER — CLINICAL DOCUMENTATION (OUTPATIENT)
Facility: HOSPITAL | Age: 65
End: 2025-02-07

## 2025-02-07 ENCOUNTER — HOSPITAL ENCOUNTER (OUTPATIENT)
Facility: HOSPITAL | Age: 65
Discharge: HOME | End: 2025-02-10

## 2025-02-07 VITALS
BODY MASS INDEX: 39.37 KG/M2 | HEIGHT: 66 IN | HEART RATE: 72 BPM | RESPIRATION RATE: 16 BRPM | SYSTOLIC BLOOD PRESSURE: 130 MMHG | DIASTOLIC BLOOD PRESSURE: 70 MMHG | WEIGHT: 245 LBS

## 2025-02-07 DIAGNOSIS — C61 PROSTATE CANCER (HCC): Primary | ICD-10-CM

## 2025-02-07 ASSESSMENT — PAIN SCALES - GENERAL: PAINLEVEL_OUTOF10: 0

## 2025-02-07 NOTE — PROGRESS NOTES
Cancer [place] at [facility]  [facility]    Radiation Oncology Consultation  Encounter Date: 02/07/25    Jasson Quiñones  [id number]  1960     Diagnosis   C61: [id number], iPSA 6.6, GG2 (+1/12) favorable intermediate risk prostate cancer    AJCC Staging has been reviewed.  History of Present Illness   Mr. Quiñones is a 64 y.o. male seen in consultation at the request of Dr. Schneider to assess the role of radiation for his prostate cancer.    His oncologic history is detailed below:  12/03/2024: PSA 9.36  12/19/2024: PSA 6.6  01/20/2025: TRUS biopsy showed a 27cc gland with cT1c examination and GS 3+4=7 disease in 1 core, total 1/12 positive cores with up to 10% core involvement.    Symptomatically, he has mild urinary symptoms for which he doesn't take any medications. From a performance status standpoint, he is able to complete his ADLs without issue, is retired and previously worked in finance at [employer]. Mr. Quiñones denies a history of inflammatory bowel disease, scleroderma or other collagen vascular diseases, pacemaker/AICD, or history of prior irradiation. Last colonoscopy was April 2024, WNL.    IPSS:  Symptom Score   0 = Not at all   Incomplete Emptying 0   1 = Less than 1 in 5   Frequency 1   2 = Less than half the time    Intermittency 0   3 = About half the time   Urgency 3   4 =  More than half the time   Weak Stream 0   5 =  Almost always   Straining 0         Nocturia 1      Total 5         Quality of Life 3 (mixed)      0-7 Mildly symptomatic  8-19 moderately symptomatic  20-35 severely symptomatic    DINAH score:  Confidence 4   Penetration 0   Ability to Maintain 0   Satisfaction 0   Maintain to Completion 0   Total 4     Review of Systems:  A complete review of systems was obtained and negative except as described above.    Allergies and Medications   No Known Allergies    Current Outpatient Medications   Medication Instructions    aspirin 81 mg, Oral, DAILY    atorvastatin

## 2025-02-07 NOTE — PROGRESS NOTES
NCCN Distress Thermometer    Radiation Oncology at [facility]    Date Screening Completed: 2/7/25    Screening Declined:  [] Yes    Number that best describes how much distress you've experienced in the past week, including today?  0 [] - No distress 1 []      2 [x]      3 []      4 []       5 []       6 []      7 []      8 []      9 []       10 [] - Extreme distress    PROBLEM LIST  Have you had concerns about any of the items below in the past week, including today?      Physical Concerns Practical Concerns   [] Pain [] Taking care of myself    [] Sleep [] Taking care of others    [] Fatigue [] Safety   [] Tobacco use  [] Work   [] Substance use  [] School   [] Memory or concentration [] Housing/Utilities   [] Sexual health [] Finances   [] Changes in eating  [] Insurance   [] Loss or change of physical abilities  [] Transportation    []    Emotional Concerns [] Having enough food   [] Worry or anxiety [] Access to medicine   [] Sadness or depression [] Treatment decisions   [] Loss of interest or enjoyment     [] Grief or loss  Spiritual or Uatsdin Concerns   [] Fear [] Sense of meaning or purpose   [] Loneliness  [] Changes in roxy or beliefs   [] Anger [] Death, dying, or afterlife   [] Changes in appearance [] Conflict between beliefs and cancer treatments    [] Feelings of worthlessness or being a burden [] Relationship with the sacred    [] Ritual or dietary needs    Social Concerns     [] Relationship with spouse or partner     [] Relationship with children    [] Relationship with family members     [] Relationship with friends or coworkers     [] Communication with health care team     [] Ability to have children     [] Prejudice or discrimination        Other Concerns: none

## 2025-02-19 ENCOUNTER — HOSPITAL ENCOUNTER (OUTPATIENT)
Facility: HOSPITAL | Age: 65
Discharge: HOME | End: 2025-02-22
Attending: STUDENT IN AN ORGANIZED HEALTH CARE EDUCATION/TRAINING PROGRAM

## 2025-02-27 ENCOUNTER — HOSPITAL ENCOUNTER (OUTPATIENT)
Age: 65
Discharge: HOME | End: 2025-02-27
Payer: COMMERCIAL

## 2025-02-27 DIAGNOSIS — C61 MALIGNANT NEOPLASM OF PROSTATE (HCC): ICD-10-CM

## 2025-02-27 PROCEDURE — 76498 UNLISTED MR PROCEDURE: CPT

## 2025-02-28 DIAGNOSIS — Z79.4 TYPE 2 DIABETES MELLITUS WITH HYPERGLYCEMIA, WITH LONG-TERM CURRENT USE OF INSULIN (HCC): ICD-10-CM

## 2025-02-28 DIAGNOSIS — E11.65 TYPE 2 DIABETES MELLITUS WITH HYPERGLYCEMIA, WITH LONG-TERM CURRENT USE OF INSULIN (HCC): ICD-10-CM

## 2025-03-03 RX ORDER — INSULIN GLARGINE 300 U/ML
INJECTION, SOLUTION SUBCUTANEOUS
Qty: 7.5 ML | Refills: 5 | Status: SHIPPED | OUTPATIENT
Start: 2025-03-03

## 2025-03-10 ENCOUNTER — HOSPITAL ENCOUNTER (OUTPATIENT)
Facility: HOSPITAL | Age: 65
Discharge: HOME | End: 2025-03-13
Attending: STUDENT IN AN ORGANIZED HEALTH CARE EDUCATION/TRAINING PROGRAM

## 2025-03-10 LAB
RAD ONC ARIA COURSE FIRST TREATMENT DATE: NORMAL
RAD ONC ARIA COURSE ID: NORMAL
RAD ONC ARIA COURSE INTENT: NORMAL
RAD ONC ARIA COURSE LAST TREATMENT DATE: NORMAL
RAD ONC ARIA COURSE SESSION NUMBER: 1
RAD ONC ARIA COURSE START DATE: NORMAL
RAD ONC ARIA COURSE TREATMENT ELAPSED DAYS: 0
RAD ONC ARIA PLAN FRACTIONS TREATED TO DATE: 1
RAD ONC ARIA PLAN ID: NORMAL
RAD ONC ARIA PLAN PRESCRIBED DOSE PER FRACTION: 3 GY
RAD ONC ARIA PLAN PRIMARY REFERENCE POINT: NORMAL
RAD ONC ARIA PLAN TOTAL FRACTIONS PRESCRIBED: 20
RAD ONC ARIA PLAN TOTAL PRESCRIBED DOSE: 6000 CGY
RAD ONC ARIA REFERENCE POINT DOSAGE GIVEN TO DATE: 3 GY
RAD ONC ARIA REFERENCE POINT DOSAGE GIVEN TO DATE: 3 GY
RAD ONC ARIA REFERENCE POINT DOSAGE GIVEN TO DATE: 3.1 GY
RAD ONC ARIA REFERENCE POINT ID: NORMAL
RAD ONC ARIA REFERENCE POINT SESSION DOSAGE GIVEN: 3 GY
RAD ONC ARIA REFERENCE POINT SESSION DOSAGE GIVEN: 3 GY
RAD ONC ARIA REFERENCE POINT SESSION DOSAGE GIVEN: 3.1 GY

## 2025-03-11 ENCOUNTER — HOSPITAL ENCOUNTER (OUTPATIENT)
Facility: HOSPITAL | Age: 65
Discharge: HOME | End: 2025-03-14
Attending: STUDENT IN AN ORGANIZED HEALTH CARE EDUCATION/TRAINING PROGRAM

## 2025-03-11 DIAGNOSIS — C61 PROSTATE CANCER (HCC): Primary | ICD-10-CM

## 2025-03-11 LAB
RAD ONC ARIA COURSE FIRST TREATMENT DATE: NORMAL
RAD ONC ARIA COURSE ID: NORMAL
RAD ONC ARIA COURSE INTENT: NORMAL
RAD ONC ARIA COURSE LAST TREATMENT DATE: NORMAL
RAD ONC ARIA COURSE SESSION NUMBER: 2
RAD ONC ARIA COURSE START DATE: NORMAL
RAD ONC ARIA COURSE TREATMENT ELAPSED DAYS: 1
RAD ONC ARIA PLAN FRACTIONS TREATED TO DATE: 2
RAD ONC ARIA PLAN ID: NORMAL
RAD ONC ARIA PLAN PRESCRIBED DOSE PER FRACTION: 3 GY
RAD ONC ARIA PLAN PRIMARY REFERENCE POINT: NORMAL
RAD ONC ARIA PLAN TOTAL FRACTIONS PRESCRIBED: 20
RAD ONC ARIA PLAN TOTAL PRESCRIBED DOSE: 6000 CGY
RAD ONC ARIA REFERENCE POINT DOSAGE GIVEN TO DATE: 6 GY
RAD ONC ARIA REFERENCE POINT DOSAGE GIVEN TO DATE: 6 GY
RAD ONC ARIA REFERENCE POINT DOSAGE GIVEN TO DATE: 6.19 GY
RAD ONC ARIA REFERENCE POINT ID: NORMAL
RAD ONC ARIA REFERENCE POINT SESSION DOSAGE GIVEN: 3 GY
RAD ONC ARIA REFERENCE POINT SESSION DOSAGE GIVEN: 3 GY
RAD ONC ARIA REFERENCE POINT SESSION DOSAGE GIVEN: 3.1 GY

## 2025-03-11 ASSESSMENT — PATIENT HEALTH QUESTIONNAIRE - PHQ9
SUM OF ALL RESPONSES TO PHQ QUESTIONS 1-9: 0
SUM OF ALL RESPONSES TO PHQ QUESTIONS 1-9: 0
2. FEELING DOWN, DEPRESSED OR HOPELESS: NOT AT ALL
SUM OF ALL RESPONSES TO PHQ QUESTIONS 1-9: 0
SUM OF ALL RESPONSES TO PHQ QUESTIONS 1-9: 0
1. LITTLE INTEREST OR PLEASURE IN DOING THINGS: NOT AT ALL

## 2025-03-11 ASSESSMENT — PAIN SCALES - GENERAL: PAINLEVEL_OUTOF10: 0

## 2025-03-11 NOTE — PROGRESS NOTES
Cancer [place] at [facility]  [facility]    Radiation Oncology Weekly Progress Note  Encounter Date: 03/11/25    Jasson Quiñones  [id number]  1960     Diagnosis   C61: [id number], iPSA 6.6, GG2 (+1/12) favorable intermediate risk prostate cancer    AJCC Staging has been reviewed.  Oncologic History   Mr. Quiñones is a 64 y.o. male initially seen to assess the overall management and role of radiation for his above diagnosis.    12/03/2024: PSA 9.4  12/19/2024: PSA 6.6  01/20/2025: TRUS biopsy showed a 27cc gland with cT1c examination and GS 3+4=7 disease in 1 core, total 1/12 positive cores with up to 10% core involvement.    He was recommended a course of radiation directed at the prostate and proximal SV (60Gy in 20fx)    Interval History   Mr. Quiñones was seen today for his weekly on-treatment evaluation    03/11/2025: at fraction 2, first OTV. Tolerating treatment without issue. Reviewed treatment expectations and logistics.    Treatment Details   Treatment Site: Prostate, SV  Treatment Technique: IMRT/VMAT  Treatment Site Dose/Fx (cGy) #Fx Delivered Dose (cGy) Total Planned Dose (cGy) Start Date End Date   Prostate,  2/20 600 6000 03/10/25 03/11/25     Concurrent Therapy: No concurrent systemic therapy    Allergies and Medications   No Known Allergies    Current Outpatient Medications   Medication Instructions    aspirin 81 mg, Oral, DAILY    atorvastatin (LIPITOR) 20 mg, Oral, DAILY    B-D UF III MINI PEN NEEDLES 31G X 5 MM MISC USE TWICE DAILY    Continuous Glucose  (DEXCOM G7 ) MICAH Use continuously    Continuous Glucose Sensor (DEXCOM G7 SENSOR) MISC Use every 10 days    empagliflozin (JARDIANCE) 25 MG tablet TAKE 1 TABLET BY MOUTH DAILY    ibuprofen (ADVIL;MOTRIN) 800 mg, Oral, 3 TIMES DAILY WITH MEALS    insulin glargine, 1 unit dial, (TOUJEO SOLOSTAR) 300 UNIT/ML concentrated injection pen INJECT 15 UNITS UNDER THE SKIN EVERY NIGHT AT BEDTIME

## 2025-03-12 ENCOUNTER — HOSPITAL ENCOUNTER (OUTPATIENT)
Facility: HOSPITAL | Age: 65
Discharge: HOME | End: 2025-03-15
Attending: STUDENT IN AN ORGANIZED HEALTH CARE EDUCATION/TRAINING PROGRAM

## 2025-03-12 LAB
RAD ONC ARIA COURSE FIRST TREATMENT DATE: NORMAL
RAD ONC ARIA COURSE ID: NORMAL
RAD ONC ARIA COURSE INTENT: NORMAL
RAD ONC ARIA COURSE LAST TREATMENT DATE: NORMAL
RAD ONC ARIA COURSE SESSION NUMBER: 3
RAD ONC ARIA COURSE START DATE: NORMAL
RAD ONC ARIA COURSE TREATMENT ELAPSED DAYS: 2
RAD ONC ARIA PLAN FRACTIONS TREATED TO DATE: 3
RAD ONC ARIA PLAN ID: NORMAL
RAD ONC ARIA PLAN PRESCRIBED DOSE PER FRACTION: 3 GY
RAD ONC ARIA PLAN PRIMARY REFERENCE POINT: NORMAL
RAD ONC ARIA PLAN TOTAL FRACTIONS PRESCRIBED: 20
RAD ONC ARIA PLAN TOTAL PRESCRIBED DOSE: 6000 CGY
RAD ONC ARIA REFERENCE POINT DOSAGE GIVEN TO DATE: 9 GY
RAD ONC ARIA REFERENCE POINT DOSAGE GIVEN TO DATE: 9 GY
RAD ONC ARIA REFERENCE POINT DOSAGE GIVEN TO DATE: 9.29 GY
RAD ONC ARIA REFERENCE POINT ID: NORMAL
RAD ONC ARIA REFERENCE POINT SESSION DOSAGE GIVEN: 3 GY
RAD ONC ARIA REFERENCE POINT SESSION DOSAGE GIVEN: 3 GY
RAD ONC ARIA REFERENCE POINT SESSION DOSAGE GIVEN: 3.1 GY

## 2025-03-13 ENCOUNTER — HOSPITAL ENCOUNTER (OUTPATIENT)
Facility: HOSPITAL | Age: 65
Discharge: HOME | End: 2025-03-16
Attending: STUDENT IN AN ORGANIZED HEALTH CARE EDUCATION/TRAINING PROGRAM

## 2025-03-13 LAB
RAD ONC ARIA COURSE FIRST TREATMENT DATE: NORMAL
RAD ONC ARIA COURSE ID: NORMAL
RAD ONC ARIA COURSE INTENT: NORMAL
RAD ONC ARIA COURSE LAST TREATMENT DATE: NORMAL
RAD ONC ARIA COURSE SESSION NUMBER: 4
RAD ONC ARIA COURSE START DATE: NORMAL
RAD ONC ARIA COURSE TREATMENT ELAPSED DAYS: 3
RAD ONC ARIA PLAN FRACTIONS TREATED TO DATE: 4
RAD ONC ARIA PLAN ID: NORMAL
RAD ONC ARIA PLAN PRESCRIBED DOSE PER FRACTION: 3 GY
RAD ONC ARIA PLAN PRIMARY REFERENCE POINT: NORMAL
RAD ONC ARIA PLAN TOTAL FRACTIONS PRESCRIBED: 20
RAD ONC ARIA PLAN TOTAL PRESCRIBED DOSE: 6000 CGY
RAD ONC ARIA REFERENCE POINT DOSAGE GIVEN TO DATE: 12 GY
RAD ONC ARIA REFERENCE POINT DOSAGE GIVEN TO DATE: 12 GY
RAD ONC ARIA REFERENCE POINT DOSAGE GIVEN TO DATE: 12.38 GY
RAD ONC ARIA REFERENCE POINT ID: NORMAL
RAD ONC ARIA REFERENCE POINT SESSION DOSAGE GIVEN: 3 GY
RAD ONC ARIA REFERENCE POINT SESSION DOSAGE GIVEN: 3 GY
RAD ONC ARIA REFERENCE POINT SESSION DOSAGE GIVEN: 3.1 GY

## 2025-03-14 ENCOUNTER — HOSPITAL ENCOUNTER (OUTPATIENT)
Facility: HOSPITAL | Age: 65
Discharge: HOME | End: 2025-03-17
Attending: STUDENT IN AN ORGANIZED HEALTH CARE EDUCATION/TRAINING PROGRAM

## 2025-03-14 LAB
RAD ONC ARIA COURSE FIRST TREATMENT DATE: NORMAL
RAD ONC ARIA COURSE ID: NORMAL
RAD ONC ARIA COURSE INTENT: NORMAL
RAD ONC ARIA COURSE LAST TREATMENT DATE: NORMAL
RAD ONC ARIA COURSE SESSION NUMBER: 5
RAD ONC ARIA COURSE START DATE: NORMAL
RAD ONC ARIA COURSE TREATMENT ELAPSED DAYS: 4
RAD ONC ARIA PLAN FRACTIONS TREATED TO DATE: 5
RAD ONC ARIA PLAN ID: NORMAL
RAD ONC ARIA PLAN PRESCRIBED DOSE PER FRACTION: 3 GY
RAD ONC ARIA PLAN PRIMARY REFERENCE POINT: NORMAL
RAD ONC ARIA PLAN TOTAL FRACTIONS PRESCRIBED: 20
RAD ONC ARIA PLAN TOTAL PRESCRIBED DOSE: 6000 CGY
RAD ONC ARIA REFERENCE POINT DOSAGE GIVEN TO DATE: 15 GY
RAD ONC ARIA REFERENCE POINT DOSAGE GIVEN TO DATE: 15 GY
RAD ONC ARIA REFERENCE POINT DOSAGE GIVEN TO DATE: 15.48 GY
RAD ONC ARIA REFERENCE POINT ID: NORMAL
RAD ONC ARIA REFERENCE POINT SESSION DOSAGE GIVEN: 3 GY
RAD ONC ARIA REFERENCE POINT SESSION DOSAGE GIVEN: 3 GY
RAD ONC ARIA REFERENCE POINT SESSION DOSAGE GIVEN: 3.1 GY

## 2025-03-17 ENCOUNTER — HOSPITAL ENCOUNTER (OUTPATIENT)
Facility: HOSPITAL | Age: 65
Discharge: HOME | End: 2025-03-20
Attending: STUDENT IN AN ORGANIZED HEALTH CARE EDUCATION/TRAINING PROGRAM

## 2025-03-17 LAB
RAD ONC ARIA COURSE FIRST TREATMENT DATE: NORMAL
RAD ONC ARIA COURSE ID: NORMAL
RAD ONC ARIA COURSE INTENT: NORMAL
RAD ONC ARIA COURSE LAST TREATMENT DATE: NORMAL
RAD ONC ARIA COURSE SESSION NUMBER: 6
RAD ONC ARIA COURSE START DATE: NORMAL
RAD ONC ARIA COURSE TREATMENT ELAPSED DAYS: 7
RAD ONC ARIA PLAN FRACTIONS TREATED TO DATE: 6
RAD ONC ARIA PLAN ID: NORMAL
RAD ONC ARIA PLAN PRESCRIBED DOSE PER FRACTION: 3 GY
RAD ONC ARIA PLAN PRIMARY REFERENCE POINT: NORMAL
RAD ONC ARIA PLAN TOTAL FRACTIONS PRESCRIBED: 20
RAD ONC ARIA PLAN TOTAL PRESCRIBED DOSE: 6000 CGY
RAD ONC ARIA REFERENCE POINT DOSAGE GIVEN TO DATE: 18 GY
RAD ONC ARIA REFERENCE POINT DOSAGE GIVEN TO DATE: 18 GY
RAD ONC ARIA REFERENCE POINT DOSAGE GIVEN TO DATE: 18.57 GY
RAD ONC ARIA REFERENCE POINT ID: NORMAL
RAD ONC ARIA REFERENCE POINT SESSION DOSAGE GIVEN: 3 GY
RAD ONC ARIA REFERENCE POINT SESSION DOSAGE GIVEN: 3 GY
RAD ONC ARIA REFERENCE POINT SESSION DOSAGE GIVEN: 3.1 GY

## 2025-03-18 ENCOUNTER — HOSPITAL ENCOUNTER (OUTPATIENT)
Facility: HOSPITAL | Age: 65
Discharge: HOME | End: 2025-03-21
Attending: STUDENT IN AN ORGANIZED HEALTH CARE EDUCATION/TRAINING PROGRAM

## 2025-03-18 DIAGNOSIS — C61 PROSTATE CANCER (HCC): Primary | ICD-10-CM

## 2025-03-18 LAB
RAD ONC ARIA COURSE FIRST TREATMENT DATE: NORMAL
RAD ONC ARIA COURSE ID: NORMAL
RAD ONC ARIA COURSE INTENT: NORMAL
RAD ONC ARIA COURSE LAST TREATMENT DATE: NORMAL
RAD ONC ARIA COURSE SESSION NUMBER: 7
RAD ONC ARIA COURSE START DATE: NORMAL
RAD ONC ARIA COURSE TREATMENT ELAPSED DAYS: 8
RAD ONC ARIA PLAN FRACTIONS TREATED TO DATE: 7
RAD ONC ARIA PLAN ID: NORMAL
RAD ONC ARIA PLAN PRESCRIBED DOSE PER FRACTION: 3 GY
RAD ONC ARIA PLAN PRIMARY REFERENCE POINT: NORMAL
RAD ONC ARIA PLAN TOTAL FRACTIONS PRESCRIBED: 20
RAD ONC ARIA PLAN TOTAL PRESCRIBED DOSE: 6000 CGY
RAD ONC ARIA REFERENCE POINT DOSAGE GIVEN TO DATE: 21 GY
RAD ONC ARIA REFERENCE POINT DOSAGE GIVEN TO DATE: 21 GY
RAD ONC ARIA REFERENCE POINT DOSAGE GIVEN TO DATE: 21.67 GY
RAD ONC ARIA REFERENCE POINT ID: NORMAL
RAD ONC ARIA REFERENCE POINT SESSION DOSAGE GIVEN: 3 GY
RAD ONC ARIA REFERENCE POINT SESSION DOSAGE GIVEN: 3 GY
RAD ONC ARIA REFERENCE POINT SESSION DOSAGE GIVEN: 3.1 GY

## 2025-03-18 ASSESSMENT — PAIN SCALES - GENERAL: PAINLEVEL_OUTOF10: 0

## 2025-03-18 NOTE — PROGRESS NOTES
Cancer [place] at [facility]  [facility]    Radiation Oncology Weekly Progress Note  Encounter Date: 03/18/25    Jasson Quiñones  [id number]  1960     Diagnosis   C61: [id number], iPSA 6.6, GG2 (+1/12) favorable intermediate risk prostate cancer    AJCC Staging has been reviewed.  Oncologic History   Mr. Quiñones is a 64 y.o. male initially seen to assess the overall management and role of radiation for his above diagnosis.    12/03/2024: PSA 9.4  12/19/2024: PSA 6.6  01/20/2025: TRUS biopsy showed a 27cc gland with cT1c examination and GS 3+4=7 disease in 1 core, total 1/12 positive cores with up to 10% core involvement.    He was recommended a course of radiation directed at the prostate and proximal SV (60Gy in 20fx)    Interval History   Mr. Quiñones was seen today for his weekly on-treatment evaluation    03/11/2025: at fraction 2, first OTV. Tolerating treatment without issue. Reviewed treatment expectations and logistics.  03/18/2025: at fraction 7, doing well today, noticing a weaker stream/hesitancy but otherwise doing well. No GI symptoms. Talked about tamsulosin/ibuprofen but wishes to hold off for now.    Treatment Details   Treatment Site: Prostate, SV  Treatment Technique: IMRT/VMAT  Treatment Site Dose/Fx (cGy) #Fx Delivered Dose (cGy) Total Planned Dose (cGy) Start Date End Date   Prostate,  7/20 2100 6000 03/10/25 03/18/25     Concurrent Therapy: No concurrent systemic therapy    Allergies and Medications   No Known Allergies    Current Outpatient Medications   Medication Instructions    aspirin 81 mg, Oral, DAILY    atorvastatin (LIPITOR) 20 mg, Oral, DAILY    B-D UF III MINI PEN NEEDLES 31G X 5 MM MISC USE TWICE DAILY    Continuous Glucose  (DEXCOM G7 ) MICAH Use continuously    Continuous Glucose Sensor (DEXCOM G7 SENSOR) MISC Use every 10 days    empagliflozin (JARDIANCE) 25 MG tablet TAKE 1 TABLET BY MOUTH DAILY    ibuprofen (ADVIL;MOTRIN) 800

## 2025-03-19 ENCOUNTER — HOSPITAL ENCOUNTER (OUTPATIENT)
Facility: HOSPITAL | Age: 65
Discharge: HOME | End: 2025-03-22
Attending: STUDENT IN AN ORGANIZED HEALTH CARE EDUCATION/TRAINING PROGRAM

## 2025-03-19 LAB
RAD ONC ARIA COURSE FIRST TREATMENT DATE: NORMAL
RAD ONC ARIA COURSE ID: NORMAL
RAD ONC ARIA COURSE INTENT: NORMAL
RAD ONC ARIA COURSE LAST TREATMENT DATE: NORMAL
RAD ONC ARIA COURSE SESSION NUMBER: 8
RAD ONC ARIA COURSE START DATE: NORMAL
RAD ONC ARIA COURSE TREATMENT ELAPSED DAYS: 9
RAD ONC ARIA PLAN FRACTIONS TREATED TO DATE: 8
RAD ONC ARIA PLAN ID: NORMAL
RAD ONC ARIA PLAN PRESCRIBED DOSE PER FRACTION: 3 GY
RAD ONC ARIA PLAN PRIMARY REFERENCE POINT: NORMAL
RAD ONC ARIA PLAN TOTAL FRACTIONS PRESCRIBED: 20
RAD ONC ARIA PLAN TOTAL PRESCRIBED DOSE: 6000 CGY
RAD ONC ARIA REFERENCE POINT DOSAGE GIVEN TO DATE: 24 GY
RAD ONC ARIA REFERENCE POINT DOSAGE GIVEN TO DATE: 24 GY
RAD ONC ARIA REFERENCE POINT DOSAGE GIVEN TO DATE: 24.76 GY
RAD ONC ARIA REFERENCE POINT ID: NORMAL
RAD ONC ARIA REFERENCE POINT SESSION DOSAGE GIVEN: 3 GY
RAD ONC ARIA REFERENCE POINT SESSION DOSAGE GIVEN: 3 GY
RAD ONC ARIA REFERENCE POINT SESSION DOSAGE GIVEN: 3.1 GY

## 2025-03-20 ENCOUNTER — HOSPITAL ENCOUNTER (OUTPATIENT)
Facility: HOSPITAL | Age: 65
Discharge: HOME | End: 2025-03-23
Attending: STUDENT IN AN ORGANIZED HEALTH CARE EDUCATION/TRAINING PROGRAM

## 2025-03-20 LAB
RAD ONC ARIA COURSE FIRST TREATMENT DATE: NORMAL
RAD ONC ARIA COURSE ID: NORMAL
RAD ONC ARIA COURSE INTENT: NORMAL
RAD ONC ARIA COURSE LAST TREATMENT DATE: NORMAL
RAD ONC ARIA COURSE SESSION NUMBER: 9
RAD ONC ARIA COURSE START DATE: NORMAL
RAD ONC ARIA COURSE TREATMENT ELAPSED DAYS: 10
RAD ONC ARIA PLAN FRACTIONS TREATED TO DATE: 9
RAD ONC ARIA PLAN ID: NORMAL
RAD ONC ARIA PLAN PRESCRIBED DOSE PER FRACTION: 3 GY
RAD ONC ARIA PLAN PRIMARY REFERENCE POINT: NORMAL
RAD ONC ARIA PLAN TOTAL FRACTIONS PRESCRIBED: 20
RAD ONC ARIA PLAN TOTAL PRESCRIBED DOSE: 6000 CGY
RAD ONC ARIA REFERENCE POINT DOSAGE GIVEN TO DATE: 27 GY
RAD ONC ARIA REFERENCE POINT DOSAGE GIVEN TO DATE: 27 GY
RAD ONC ARIA REFERENCE POINT DOSAGE GIVEN TO DATE: 27.86 GY
RAD ONC ARIA REFERENCE POINT ID: NORMAL
RAD ONC ARIA REFERENCE POINT SESSION DOSAGE GIVEN: 3 GY
RAD ONC ARIA REFERENCE POINT SESSION DOSAGE GIVEN: 3 GY
RAD ONC ARIA REFERENCE POINT SESSION DOSAGE GIVEN: 3.1 GY

## 2025-03-21 ENCOUNTER — HOSPITAL ENCOUNTER (OUTPATIENT)
Facility: HOSPITAL | Age: 65
Discharge: HOME | End: 2025-03-24
Attending: STUDENT IN AN ORGANIZED HEALTH CARE EDUCATION/TRAINING PROGRAM

## 2025-03-21 LAB
RAD ONC ARIA COURSE FIRST TREATMENT DATE: NORMAL
RAD ONC ARIA COURSE ID: NORMAL
RAD ONC ARIA COURSE INTENT: NORMAL
RAD ONC ARIA COURSE LAST TREATMENT DATE: NORMAL
RAD ONC ARIA COURSE SESSION NUMBER: 10
RAD ONC ARIA COURSE START DATE: NORMAL
RAD ONC ARIA COURSE TREATMENT ELAPSED DAYS: 11
RAD ONC ARIA PLAN FRACTIONS TREATED TO DATE: 10
RAD ONC ARIA PLAN ID: NORMAL
RAD ONC ARIA PLAN PRESCRIBED DOSE PER FRACTION: 3 GY
RAD ONC ARIA PLAN PRIMARY REFERENCE POINT: NORMAL
RAD ONC ARIA PLAN TOTAL FRACTIONS PRESCRIBED: 20
RAD ONC ARIA PLAN TOTAL PRESCRIBED DOSE: 6000 CGY
RAD ONC ARIA REFERENCE POINT DOSAGE GIVEN TO DATE: 30 GY
RAD ONC ARIA REFERENCE POINT DOSAGE GIVEN TO DATE: 30 GY
RAD ONC ARIA REFERENCE POINT DOSAGE GIVEN TO DATE: 30.95 GY
RAD ONC ARIA REFERENCE POINT ID: NORMAL
RAD ONC ARIA REFERENCE POINT SESSION DOSAGE GIVEN: 3 GY
RAD ONC ARIA REFERENCE POINT SESSION DOSAGE GIVEN: 3 GY
RAD ONC ARIA REFERENCE POINT SESSION DOSAGE GIVEN: 3.1 GY

## 2025-03-21 RX ORDER — METFORMIN HYDROCHLORIDE 500 MG/1
TABLET, EXTENDED RELEASE ORAL
Qty: 360 TABLET | Refills: 1 | Status: SHIPPED | OUTPATIENT
Start: 2025-03-21

## 2025-03-24 ENCOUNTER — HOSPITAL ENCOUNTER (OUTPATIENT)
Facility: HOSPITAL | Age: 65
Discharge: HOME | End: 2025-03-27
Attending: STUDENT IN AN ORGANIZED HEALTH CARE EDUCATION/TRAINING PROGRAM

## 2025-03-24 LAB
RAD ONC ARIA COURSE FIRST TREATMENT DATE: NORMAL
RAD ONC ARIA COURSE ID: NORMAL
RAD ONC ARIA COURSE INTENT: NORMAL
RAD ONC ARIA COURSE LAST TREATMENT DATE: NORMAL
RAD ONC ARIA COURSE SESSION NUMBER: 11
RAD ONC ARIA COURSE START DATE: NORMAL
RAD ONC ARIA COURSE TREATMENT ELAPSED DAYS: 14
RAD ONC ARIA PLAN FRACTIONS TREATED TO DATE: 11
RAD ONC ARIA PLAN ID: NORMAL
RAD ONC ARIA PLAN PRESCRIBED DOSE PER FRACTION: 3 GY
RAD ONC ARIA PLAN PRIMARY REFERENCE POINT: NORMAL
RAD ONC ARIA PLAN TOTAL FRACTIONS PRESCRIBED: 20
RAD ONC ARIA PLAN TOTAL PRESCRIBED DOSE: 6000 CGY
RAD ONC ARIA REFERENCE POINT DOSAGE GIVEN TO DATE: 33 GY
RAD ONC ARIA REFERENCE POINT DOSAGE GIVEN TO DATE: 33 GY
RAD ONC ARIA REFERENCE POINT DOSAGE GIVEN TO DATE: 34.05 GY
RAD ONC ARIA REFERENCE POINT ID: NORMAL
RAD ONC ARIA REFERENCE POINT SESSION DOSAGE GIVEN: 3 GY
RAD ONC ARIA REFERENCE POINT SESSION DOSAGE GIVEN: 3 GY
RAD ONC ARIA REFERENCE POINT SESSION DOSAGE GIVEN: 3.1 GY

## 2025-03-25 ENCOUNTER — HOSPITAL ENCOUNTER (OUTPATIENT)
Facility: HOSPITAL | Age: 65
Discharge: HOME | End: 2025-03-28
Attending: STUDENT IN AN ORGANIZED HEALTH CARE EDUCATION/TRAINING PROGRAM

## 2025-03-25 DIAGNOSIS — C61 PROSTATE CANCER (HCC): Primary | ICD-10-CM

## 2025-03-25 LAB
RAD ONC ARIA COURSE FIRST TREATMENT DATE: NORMAL
RAD ONC ARIA COURSE ID: NORMAL
RAD ONC ARIA COURSE INTENT: NORMAL
RAD ONC ARIA COURSE LAST TREATMENT DATE: NORMAL
RAD ONC ARIA COURSE SESSION NUMBER: 12
RAD ONC ARIA COURSE START DATE: NORMAL
RAD ONC ARIA COURSE TREATMENT ELAPSED DAYS: 15
RAD ONC ARIA PLAN FRACTIONS TREATED TO DATE: 12
RAD ONC ARIA PLAN ID: NORMAL
RAD ONC ARIA PLAN PRESCRIBED DOSE PER FRACTION: 3 GY
RAD ONC ARIA PLAN PRIMARY REFERENCE POINT: NORMAL
RAD ONC ARIA PLAN TOTAL FRACTIONS PRESCRIBED: 20
RAD ONC ARIA PLAN TOTAL PRESCRIBED DOSE: 6000 CGY
RAD ONC ARIA REFERENCE POINT DOSAGE GIVEN TO DATE: 36 GY
RAD ONC ARIA REFERENCE POINT DOSAGE GIVEN TO DATE: 36 GY
RAD ONC ARIA REFERENCE POINT DOSAGE GIVEN TO DATE: 37.15 GY
RAD ONC ARIA REFERENCE POINT ID: NORMAL
RAD ONC ARIA REFERENCE POINT SESSION DOSAGE GIVEN: 3 GY
RAD ONC ARIA REFERENCE POINT SESSION DOSAGE GIVEN: 3 GY
RAD ONC ARIA REFERENCE POINT SESSION DOSAGE GIVEN: 3.1 GY

## 2025-03-25 ASSESSMENT — PAIN SCALES - GENERAL: PAINLEVEL_OUTOF10: 0

## 2025-03-25 NOTE — PROGRESS NOTES
Cancer [place] at [facility]  [facility]    Radiation Oncology Weekly Progress Note  Encounter Date: 03/25/25    Jasson Quiñones  [id number]  1960     Diagnosis   C61: [id number], iPSA 6.6, GG2 (+1/12) favorable intermediate risk prostate cancer    AJCC Staging has been reviewed.  Oncologic History   Mr. Quiñones is a 64 y.o. male initially seen to assess the overall management and role of radiation for his above diagnosis.    12/03/2024: PSA 9.4  12/19/2024: PSA 6.6  01/20/2025: TRUS biopsy showed a 27cc gland with cT1c examination and GS 3+4=7 disease in 1 core, total 1/12 positive cores with up to 10% core involvement.    He was recommended a course of radiation directed at the prostate and proximal SV (60Gy in 20fx)    Interval History   Mr. Quiñones was seen today for his weekly on-treatment evaluation    03/11/2025: at fraction 2, first OTV. Tolerating treatment without issue. Reviewed treatment expectations and logistics.  03/18/2025: at fraction 7, doing well today, noticing a weaker stream/hesitancy but otherwise doing well. No GI symptoms. Talked about tamsulosin/ibuprofen but wishes to hold off for now.  03/25/2025: at fraction 12, overall doing well today, notes some increased loose stools but no diarrhea.  symptoms stable. Talked about potential for low-dose imodium if things progress.     Treatment Details   Treatment Site: Prostate, SV  Treatment Technique: IMRT/VMAT  Treatment Site Dose/Fx (cGy) #Fx Delivered Dose (cGy) Total Planned Dose (cGy) Start Date End Date   Prostate,  12/20 3600 6000 03/10/25 03/25/25     Concurrent Therapy: No concurrent systemic therapy    Allergies and Medications   No Known Allergies    Current Outpatient Medications   Medication Instructions    aspirin 81 mg, Oral, DAILY    atorvastatin (LIPITOR) 20 mg, Oral, DAILY    B-D UF III MINI PEN NEEDLES 31G X 5 MM MISC USE TWICE DAILY    Continuous Glucose  (DEXCOM G7 ) MICAH

## 2025-03-26 ENCOUNTER — HOSPITAL ENCOUNTER (OUTPATIENT)
Facility: HOSPITAL | Age: 65
Discharge: HOME | End: 2025-03-29
Attending: STUDENT IN AN ORGANIZED HEALTH CARE EDUCATION/TRAINING PROGRAM

## 2025-03-26 LAB
RAD ONC ARIA COURSE FIRST TREATMENT DATE: NORMAL
RAD ONC ARIA COURSE ID: NORMAL
RAD ONC ARIA COURSE INTENT: NORMAL
RAD ONC ARIA COURSE LAST TREATMENT DATE: NORMAL
RAD ONC ARIA COURSE SESSION NUMBER: 13
RAD ONC ARIA COURSE START DATE: NORMAL
RAD ONC ARIA COURSE TREATMENT ELAPSED DAYS: 16
RAD ONC ARIA PLAN FRACTIONS TREATED TO DATE: 13
RAD ONC ARIA PLAN ID: NORMAL
RAD ONC ARIA PLAN PRESCRIBED DOSE PER FRACTION: 3 GY
RAD ONC ARIA PLAN PRIMARY REFERENCE POINT: NORMAL
RAD ONC ARIA PLAN TOTAL FRACTIONS PRESCRIBED: 20
RAD ONC ARIA PLAN TOTAL PRESCRIBED DOSE: 6000 CGY
RAD ONC ARIA REFERENCE POINT DOSAGE GIVEN TO DATE: 39 GY
RAD ONC ARIA REFERENCE POINT DOSAGE GIVEN TO DATE: 39 GY
RAD ONC ARIA REFERENCE POINT DOSAGE GIVEN TO DATE: 40.24 GY
RAD ONC ARIA REFERENCE POINT ID: NORMAL
RAD ONC ARIA REFERENCE POINT SESSION DOSAGE GIVEN: 3 GY
RAD ONC ARIA REFERENCE POINT SESSION DOSAGE GIVEN: 3 GY
RAD ONC ARIA REFERENCE POINT SESSION DOSAGE GIVEN: 3.1 GY

## 2025-03-27 ENCOUNTER — HOSPITAL ENCOUNTER (OUTPATIENT)
Facility: HOSPITAL | Age: 65
Discharge: HOME | End: 2025-03-30
Attending: STUDENT IN AN ORGANIZED HEALTH CARE EDUCATION/TRAINING PROGRAM

## 2025-03-27 LAB
RAD ONC ARIA COURSE FIRST TREATMENT DATE: NORMAL
RAD ONC ARIA COURSE ID: NORMAL
RAD ONC ARIA COURSE INTENT: NORMAL
RAD ONC ARIA COURSE LAST TREATMENT DATE: NORMAL
RAD ONC ARIA COURSE SESSION NUMBER: 14
RAD ONC ARIA COURSE START DATE: NORMAL
RAD ONC ARIA COURSE TREATMENT ELAPSED DAYS: 17
RAD ONC ARIA PLAN FRACTIONS TREATED TO DATE: 14
RAD ONC ARIA PLAN ID: NORMAL
RAD ONC ARIA PLAN PRESCRIBED DOSE PER FRACTION: 3 GY
RAD ONC ARIA PLAN PRIMARY REFERENCE POINT: NORMAL
RAD ONC ARIA PLAN TOTAL FRACTIONS PRESCRIBED: 20
RAD ONC ARIA PLAN TOTAL PRESCRIBED DOSE: 6000 CGY
RAD ONC ARIA REFERENCE POINT DOSAGE GIVEN TO DATE: 42 GY
RAD ONC ARIA REFERENCE POINT DOSAGE GIVEN TO DATE: 42 GY
RAD ONC ARIA REFERENCE POINT DOSAGE GIVEN TO DATE: 43.34 GY
RAD ONC ARIA REFERENCE POINT ID: NORMAL
RAD ONC ARIA REFERENCE POINT SESSION DOSAGE GIVEN: 3 GY
RAD ONC ARIA REFERENCE POINT SESSION DOSAGE GIVEN: 3 GY
RAD ONC ARIA REFERENCE POINT SESSION DOSAGE GIVEN: 3.1 GY

## 2025-03-28 ENCOUNTER — HOSPITAL ENCOUNTER (OUTPATIENT)
Facility: HOSPITAL | Age: 65
Discharge: HOME | End: 2025-03-31
Attending: STUDENT IN AN ORGANIZED HEALTH CARE EDUCATION/TRAINING PROGRAM

## 2025-03-28 LAB
RAD ONC ARIA COURSE FIRST TREATMENT DATE: NORMAL
RAD ONC ARIA COURSE ID: NORMAL
RAD ONC ARIA COURSE INTENT: NORMAL
RAD ONC ARIA COURSE LAST TREATMENT DATE: NORMAL
RAD ONC ARIA COURSE SESSION NUMBER: 15
RAD ONC ARIA COURSE START DATE: NORMAL
RAD ONC ARIA COURSE TREATMENT ELAPSED DAYS: 18
RAD ONC ARIA PLAN FRACTIONS TREATED TO DATE: 15
RAD ONC ARIA PLAN ID: NORMAL
RAD ONC ARIA PLAN PRESCRIBED DOSE PER FRACTION: 3 GY
RAD ONC ARIA PLAN PRIMARY REFERENCE POINT: NORMAL
RAD ONC ARIA PLAN TOTAL FRACTIONS PRESCRIBED: 20
RAD ONC ARIA PLAN TOTAL PRESCRIBED DOSE: 6000 CGY
RAD ONC ARIA REFERENCE POINT DOSAGE GIVEN TO DATE: 45 GY
RAD ONC ARIA REFERENCE POINT DOSAGE GIVEN TO DATE: 45 GY
RAD ONC ARIA REFERENCE POINT DOSAGE GIVEN TO DATE: 46.43 GY
RAD ONC ARIA REFERENCE POINT ID: NORMAL
RAD ONC ARIA REFERENCE POINT SESSION DOSAGE GIVEN: 3 GY
RAD ONC ARIA REFERENCE POINT SESSION DOSAGE GIVEN: 3 GY
RAD ONC ARIA REFERENCE POINT SESSION DOSAGE GIVEN: 3.1 GY

## 2025-03-31 ENCOUNTER — HOSPITAL ENCOUNTER (OUTPATIENT)
Facility: HOSPITAL | Age: 65
Discharge: HOME | End: 2025-04-03
Attending: STUDENT IN AN ORGANIZED HEALTH CARE EDUCATION/TRAINING PROGRAM

## 2025-03-31 LAB
RAD ONC ARIA COURSE FIRST TREATMENT DATE: NORMAL
RAD ONC ARIA COURSE ID: NORMAL
RAD ONC ARIA COURSE INTENT: NORMAL
RAD ONC ARIA COURSE LAST TREATMENT DATE: NORMAL
RAD ONC ARIA COURSE SESSION NUMBER: 16
RAD ONC ARIA COURSE START DATE: NORMAL
RAD ONC ARIA COURSE TREATMENT ELAPSED DAYS: 21
RAD ONC ARIA PLAN FRACTIONS TREATED TO DATE: 16
RAD ONC ARIA PLAN ID: NORMAL
RAD ONC ARIA PLAN PRESCRIBED DOSE PER FRACTION: 3 GY
RAD ONC ARIA PLAN PRIMARY REFERENCE POINT: NORMAL
RAD ONC ARIA PLAN TOTAL FRACTIONS PRESCRIBED: 20
RAD ONC ARIA PLAN TOTAL PRESCRIBED DOSE: 6000 CGY
RAD ONC ARIA REFERENCE POINT DOSAGE GIVEN TO DATE: 48 GY
RAD ONC ARIA REFERENCE POINT DOSAGE GIVEN TO DATE: 48 GY
RAD ONC ARIA REFERENCE POINT DOSAGE GIVEN TO DATE: 49.53 GY
RAD ONC ARIA REFERENCE POINT ID: NORMAL
RAD ONC ARIA REFERENCE POINT SESSION DOSAGE GIVEN: 3 GY
RAD ONC ARIA REFERENCE POINT SESSION DOSAGE GIVEN: 3 GY
RAD ONC ARIA REFERENCE POINT SESSION DOSAGE GIVEN: 3.1 GY

## 2025-04-01 ENCOUNTER — HOSPITAL ENCOUNTER (OUTPATIENT)
Facility: HOSPITAL | Age: 65
Discharge: HOME | End: 2025-04-04
Attending: STUDENT IN AN ORGANIZED HEALTH CARE EDUCATION/TRAINING PROGRAM

## 2025-04-01 ENCOUNTER — PATIENT MESSAGE (OUTPATIENT)
Dept: PRIMARY CARE CLINIC | Facility: CLINIC | Age: 65
End: 2025-04-01

## 2025-04-01 DIAGNOSIS — E11.65 TYPE 2 DIABETES MELLITUS WITH HYPERGLYCEMIA, WITH LONG-TERM CURRENT USE OF INSULIN (HCC): ICD-10-CM

## 2025-04-01 DIAGNOSIS — Z79.4 TYPE 2 DIABETES MELLITUS WITH HYPERGLYCEMIA, WITH LONG-TERM CURRENT USE OF INSULIN (HCC): ICD-10-CM

## 2025-04-01 LAB
RAD ONC ARIA COURSE FIRST TREATMENT DATE: NORMAL
RAD ONC ARIA COURSE ID: NORMAL
RAD ONC ARIA COURSE INTENT: NORMAL
RAD ONC ARIA COURSE LAST TREATMENT DATE: NORMAL
RAD ONC ARIA COURSE SESSION NUMBER: 17
RAD ONC ARIA COURSE START DATE: NORMAL
RAD ONC ARIA COURSE TREATMENT ELAPSED DAYS: 22
RAD ONC ARIA PLAN FRACTIONS TREATED TO DATE: 17
RAD ONC ARIA PLAN ID: NORMAL
RAD ONC ARIA PLAN PRESCRIBED DOSE PER FRACTION: 3 GY
RAD ONC ARIA PLAN PRIMARY REFERENCE POINT: NORMAL
RAD ONC ARIA PLAN TOTAL FRACTIONS PRESCRIBED: 20
RAD ONC ARIA PLAN TOTAL PRESCRIBED DOSE: 6000 CGY
RAD ONC ARIA REFERENCE POINT DOSAGE GIVEN TO DATE: 51 GY
RAD ONC ARIA REFERENCE POINT DOSAGE GIVEN TO DATE: 51 GY
RAD ONC ARIA REFERENCE POINT DOSAGE GIVEN TO DATE: 52.62 GY
RAD ONC ARIA REFERENCE POINT ID: NORMAL
RAD ONC ARIA REFERENCE POINT SESSION DOSAGE GIVEN: 3 GY
RAD ONC ARIA REFERENCE POINT SESSION DOSAGE GIVEN: 3 GY
RAD ONC ARIA REFERENCE POINT SESSION DOSAGE GIVEN: 3.1 GY

## 2025-04-01 NOTE — PROGRESS NOTES
Cancer [place] at [facility]  [facility]    Radiation Oncology Weekly Progress Note  Encounter Date: 04/01/25    Jasson Quiñones  [id number]  1960     Diagnosis   C61: [id number], iPSA 6.6, GG2 (+1/12) favorable intermediate risk prostate cancer     AJCC Staging has been reviewed.  Oncologic History   Mr. Quiñones is a 64 y.o. male initially seen to assess the overall management and role of radiation for his above diagnosis.    12/03/2024: PSA 9.4  12/19/2024: PSA 6.6  01/20/2025: TRUS biopsy showed a 27cc gland with cT1c examination and GS 3+4=7 disease in 1 core, total 1/12 positive cores with up to 10% core involvement.     He was recommended a course of radiation directed at the prostate and proximal SV (60Gy in 20fx)     Interval History   Mr. Quiñones was seen today for his weekly on-treatment evaluation    03/11/2025: at fraction 2, first OTV. Tolerating treatment without issue. Reviewed treatment expectations and logistics.  03/18/2025: at fraction 7, doing well today, noticing a weaker stream/hesitancy but otherwise doing well. No GI symptoms. Talked about tamsulosin/ibuprofen but wishes to hold off for now.  03/25/2025: at fraction 12, overall doing well today, notes some increased loose stools but no diarrhea.  symptoms stable. Talked about potential for low-dose imodium if things progress  04/01/2025-  at fraction 17,  Complains of hesitancy. Nocturia x3. Otherwise no other  or GI symptoms. Asked how long to be on Advil since he is almost completed. Suggested he can stop after completion of RT but use prn    Treatment Details   Treatment Site: Prostate, SV  Treatment Technique: IMRT/VMAT  Treatment Site Dose/Fx (cGy) #Fx Delivered Dose (cGy) Total Planned Dose (cGy) Start Date End Date   Prostate,  17/20 5100 6000 03/10/25 04/01/25       Concurrent Therapy: No concurrent systemic therapy    Allergies and Medications   No Known Allergies    Current

## 2025-04-02 ENCOUNTER — HOSPITAL ENCOUNTER (OUTPATIENT)
Facility: HOSPITAL | Age: 65
Discharge: HOME | End: 2025-04-05
Attending: STUDENT IN AN ORGANIZED HEALTH CARE EDUCATION/TRAINING PROGRAM

## 2025-04-02 LAB
RAD ONC ARIA COURSE FIRST TREATMENT DATE: NORMAL
RAD ONC ARIA COURSE ID: NORMAL
RAD ONC ARIA COURSE INTENT: NORMAL
RAD ONC ARIA COURSE LAST TREATMENT DATE: NORMAL
RAD ONC ARIA COURSE SESSION NUMBER: 18
RAD ONC ARIA COURSE START DATE: NORMAL
RAD ONC ARIA COURSE TREATMENT ELAPSED DAYS: 23
RAD ONC ARIA PLAN FRACTIONS TREATED TO DATE: 18
RAD ONC ARIA PLAN ID: NORMAL
RAD ONC ARIA PLAN PRESCRIBED DOSE PER FRACTION: 3 GY
RAD ONC ARIA PLAN PRIMARY REFERENCE POINT: NORMAL
RAD ONC ARIA PLAN TOTAL FRACTIONS PRESCRIBED: 20
RAD ONC ARIA PLAN TOTAL PRESCRIBED DOSE: 6000 CGY
RAD ONC ARIA REFERENCE POINT DOSAGE GIVEN TO DATE: 54 GY
RAD ONC ARIA REFERENCE POINT DOSAGE GIVEN TO DATE: 54 GY
RAD ONC ARIA REFERENCE POINT DOSAGE GIVEN TO DATE: 55.72 GY
RAD ONC ARIA REFERENCE POINT ID: NORMAL
RAD ONC ARIA REFERENCE POINT SESSION DOSAGE GIVEN: 3 GY
RAD ONC ARIA REFERENCE POINT SESSION DOSAGE GIVEN: 3 GY
RAD ONC ARIA REFERENCE POINT SESSION DOSAGE GIVEN: 3.1 GY

## 2025-04-02 RX ORDER — ACYCLOVIR 400 MG/1
TABLET ORAL
Qty: 9 EACH | Refills: 3 | Status: SHIPPED | OUTPATIENT
Start: 2025-04-02 | End: 2025-04-03 | Stop reason: SDUPTHER

## 2025-04-03 ENCOUNTER — HOSPITAL ENCOUNTER (OUTPATIENT)
Facility: HOSPITAL | Age: 65
Discharge: HOME | End: 2025-04-06
Attending: STUDENT IN AN ORGANIZED HEALTH CARE EDUCATION/TRAINING PROGRAM

## 2025-04-03 LAB
RAD ONC ARIA COURSE FIRST TREATMENT DATE: NORMAL
RAD ONC ARIA COURSE ID: NORMAL
RAD ONC ARIA COURSE INTENT: NORMAL
RAD ONC ARIA COURSE LAST TREATMENT DATE: NORMAL
RAD ONC ARIA COURSE SESSION NUMBER: 19
RAD ONC ARIA COURSE START DATE: NORMAL
RAD ONC ARIA COURSE TREATMENT ELAPSED DAYS: 24
RAD ONC ARIA PLAN FRACTIONS TREATED TO DATE: 19
RAD ONC ARIA PLAN ID: NORMAL
RAD ONC ARIA PLAN PRESCRIBED DOSE PER FRACTION: 3 GY
RAD ONC ARIA PLAN PRIMARY REFERENCE POINT: NORMAL
RAD ONC ARIA PLAN TOTAL FRACTIONS PRESCRIBED: 20
RAD ONC ARIA PLAN TOTAL PRESCRIBED DOSE: 6000 CGY
RAD ONC ARIA REFERENCE POINT DOSAGE GIVEN TO DATE: 57 GY
RAD ONC ARIA REFERENCE POINT DOSAGE GIVEN TO DATE: 57 GY
RAD ONC ARIA REFERENCE POINT DOSAGE GIVEN TO DATE: 58.81 GY
RAD ONC ARIA REFERENCE POINT ID: NORMAL
RAD ONC ARIA REFERENCE POINT SESSION DOSAGE GIVEN: 3 GY
RAD ONC ARIA REFERENCE POINT SESSION DOSAGE GIVEN: 3 GY
RAD ONC ARIA REFERENCE POINT SESSION DOSAGE GIVEN: 3.1 GY

## 2025-04-03 RX ORDER — ACYCLOVIR 400 MG/1
TABLET ORAL
Qty: 9 EACH | Refills: 3 | Status: SHIPPED | OUTPATIENT
Start: 2025-04-03

## 2025-04-04 ENCOUNTER — CLINICAL DOCUMENTATION (OUTPATIENT)
Facility: HOSPITAL | Age: 65
End: 2025-04-04

## 2025-04-04 ENCOUNTER — HOSPITAL ENCOUNTER (OUTPATIENT)
Facility: HOSPITAL | Age: 65
Discharge: HOME | End: 2025-04-07
Attending: STUDENT IN AN ORGANIZED HEALTH CARE EDUCATION/TRAINING PROGRAM

## 2025-04-04 LAB
RAD ONC ARIA COURSE FIRST TREATMENT DATE: NORMAL
RAD ONC ARIA COURSE ID: NORMAL
RAD ONC ARIA COURSE INTENT: NORMAL
RAD ONC ARIA COURSE LAST TREATMENT DATE: NORMAL
RAD ONC ARIA COURSE SESSION NUMBER: 20
RAD ONC ARIA COURSE START DATE: NORMAL
RAD ONC ARIA COURSE TREATMENT ELAPSED DAYS: 25
RAD ONC ARIA PLAN FRACTIONS TREATED TO DATE: 20
RAD ONC ARIA PLAN ID: NORMAL
RAD ONC ARIA PLAN PRESCRIBED DOSE PER FRACTION: 3 GY
RAD ONC ARIA PLAN PRIMARY REFERENCE POINT: NORMAL
RAD ONC ARIA PLAN TOTAL FRACTIONS PRESCRIBED: 20
RAD ONC ARIA PLAN TOTAL PRESCRIBED DOSE: 6000 CGY
RAD ONC ARIA REFERENCE POINT DOSAGE GIVEN TO DATE: 60 GY
RAD ONC ARIA REFERENCE POINT DOSAGE GIVEN TO DATE: 60 GY
RAD ONC ARIA REFERENCE POINT DOSAGE GIVEN TO DATE: 61.91 GY
RAD ONC ARIA REFERENCE POINT ID: NORMAL
RAD ONC ARIA REFERENCE POINT SESSION DOSAGE GIVEN: 3 GY
RAD ONC ARIA REFERENCE POINT SESSION DOSAGE GIVEN: 3 GY
RAD ONC ARIA REFERENCE POINT SESSION DOSAGE GIVEN: 3.1 GY

## 2025-04-04 NOTE — PROGRESS NOTES
[facility]  [facility]    Radiation Oncology End of Treatment Summary    Jasson Quiñones  [id number]  1960     Diagnosis   C61: [id number], iPSA 6.6, GG2 (+1/12) favorable intermediate risk prostate cancer      AJCC Staging has been reviewed.  Oncologic History   Mr. Quiñones is a 64 y.o. male initially seen in consultation to assess the overall management and role of radiation for his above diagnosis.    12/03/2024: PSA 9.4  12/19/2024: PSA 6.6  01/20/2025: TRUS biopsy showed a 27cc gland with cT1c examination and GS 3+4=7 disease in 1 core, total 1/12 positive cores with up to 10% core involvement.      He was recommended a course of radiation directed at the prostate and proximal SV (60Gy in 20fx)     Treatment Details:   Treatment Site: Prostate, SV  Treatment Technique: IMRT/VMAT  Treatment Site Dose/Fx (cGy) #Fx Delivered Dose (cGy) Start Date End Date Elapsed Days   Prostate,  56 1338 03/10/2025 04/04/2025 26     Concurrent Therapy: No concurrent systemic therapy    Under-treatment Course Summary   He completed radiation without any unexpected side effects to treatment.    Follow Up Plan   - Follow up in 3 months with PSA    Thank you for the opportunity to participate in Mr. Quiñones's care.    Caleb Maurice MD  [facility]  [facility]  [address] [place] [address]  P: [phone]  [facility] [address], [place] [address]  P: [phone]  [facility]  [address] [place] [address], [address], [place] [address]  P: [phone]

## 2025-05-22 ENCOUNTER — OFFICE VISIT (OUTPATIENT)
Dept: PRIMARY CARE CLINIC | Facility: CLINIC | Age: 65
End: 2025-05-22
Payer: COMMERCIAL

## 2025-05-22 VITALS
HEIGHT: 66 IN | HEART RATE: 60 BPM | RESPIRATION RATE: 16 BRPM | BODY MASS INDEX: 38.89 KG/M2 | TEMPERATURE: 98 F | OXYGEN SATURATION: 95 % | WEIGHT: 242 LBS | SYSTOLIC BLOOD PRESSURE: 107 MMHG | DIASTOLIC BLOOD PRESSURE: 68 MMHG

## 2025-05-22 DIAGNOSIS — E11.9 TYPE 2 DIABETES MELLITUS WITHOUT COMPLICATION, WITH LONG-TERM CURRENT USE OF INSULIN (HCC): ICD-10-CM

## 2025-05-22 DIAGNOSIS — I10 HYPERTENSION, UNSPECIFIED TYPE: ICD-10-CM

## 2025-05-22 DIAGNOSIS — E66.01 MORBID OBESITY (HCC): ICD-10-CM

## 2025-05-22 DIAGNOSIS — E11.3411 SEVERE NONPROLIFERATIVE DIABETIC RETINOPATHY OF RIGHT EYE WITH MACULAR EDEMA ASSOCIATED WITH TYPE 2 DIABETES MELLITUS (HCC): ICD-10-CM

## 2025-05-22 DIAGNOSIS — Z00.00 ENCOUNTER FOR WELL ADULT EXAM WITHOUT ABNORMAL FINDINGS: Primary | ICD-10-CM

## 2025-05-22 DIAGNOSIS — Z79.4 TYPE 2 DIABETES MELLITUS WITHOUT COMPLICATION, WITH LONG-TERM CURRENT USE OF INSULIN (HCC): ICD-10-CM

## 2025-05-22 DIAGNOSIS — C61 PROSTATE CANCER (HCC): ICD-10-CM

## 2025-05-22 PROCEDURE — 99214 OFFICE O/P EST MOD 30 MIN: CPT | Performed by: FAMILY MEDICINE

## 2025-05-22 PROCEDURE — 3078F DIAST BP <80 MM HG: CPT | Performed by: FAMILY MEDICINE

## 2025-05-22 PROCEDURE — 99396 PREV VISIT EST AGE 40-64: CPT | Performed by: FAMILY MEDICINE

## 2025-05-22 PROCEDURE — 3074F SYST BP LT 130 MM HG: CPT | Performed by: FAMILY MEDICINE

## 2025-05-22 RX ORDER — LIRAGLUTIDE 6 MG/ML
0.6 INJECTION SUBCUTANEOUS DAILY
Qty: 6 ML | Refills: 3 | Status: SHIPPED
Start: 2025-05-22

## 2025-05-22 RX ORDER — LOSARTAN POTASSIUM 100 MG/1
100 TABLET ORAL DAILY
Qty: 90 TABLET | Refills: 1 | Status: SHIPPED | OUTPATIENT
Start: 2025-05-22

## 2025-05-22 SDOH — ECONOMIC STABILITY: FOOD INSECURITY: WITHIN THE PAST 12 MONTHS, YOU WORRIED THAT YOUR FOOD WOULD RUN OUT BEFORE YOU GOT MONEY TO BUY MORE.: NEVER TRUE

## 2025-05-22 SDOH — ECONOMIC STABILITY: FOOD INSECURITY: WITHIN THE PAST 12 MONTHS, THE FOOD YOU BOUGHT JUST DIDN'T LAST AND YOU DIDN'T HAVE MONEY TO GET MORE.: NEVER TRUE

## 2025-05-22 NOTE — PROGRESS NOTES
Subjective  Chief Complaint   Patient presents with    Annual Exam     HPI:  Jasson Quiñones is a 64 y.o. male.    History of Present Illness  The patient presents for a physical exam as well as evaluation of diabetes mellitus blood pressure management    Patient is up-to-date on his cancer screenings.  He currently follows with radiation oncology for surveillance of prostate cancer.  His colonoscopy is due in 2027.  Patient continues to go to the gym, but reports that he did decrease his frequency while receiving radiation therapy and is attempting to recover his former level of activity.    He has been experiencing issues with his Dexcom device, which is not functioning properly, limiting his ability to monitor his blood glucose levels. He plans to contact [employer] today regarding this issue and expresses concern about the availability of replacement devices and the potential need for a prescription refill. He reports elevated blood glucose levels even after fasting for 24 hours and notes that his levels can spike to 160 or 170 pre-gym workout before normalizing. He has previously consulted a nutritionist and found the accountability aspect beneficial. He continues to experience occasional gastrointestinal upset and fatigue. He maintains a high-protein diet and experiences occasional hypoglycemic episodes at night, typically between 2:00 and 4:00 AM. He recalls one instance where he struggled to raise his blood glucose level, which eventually increased to 108 after consuming orange juice and chocolate. He is currently on Victoza 0.6 and Toujeo 15 units at night. He also takes metformin 2 tablets 4 times daily.    He monitors his blood pressure at home intermittently and is open to reducing his medication dosage. He reports frequent urination.    He has completed radiation therapy for prostate cancer and is scheduled for a follow-up visit with Dr. Maurice. He has undergone several colonoscopies and is due for

## 2025-05-22 NOTE — ASSESSMENT & PLAN NOTE
CGM shows increased sugars following radiation therapy.  Sugars should improve as he recovers.  Discussed insulin adjustments based on hypo or hyperglycemia.

## 2025-05-22 NOTE — PROGRESS NOTES
Chief Complaint   Patient presents with    Annual Exam     /68 (BP Site: Left Upper Arm, Patient Position: Sitting)   Pulse 60   Temp 98 °F (36.7 °C) (Oral)   Resp 16   Ht 1.676 m (5' 6\")   Wt 109.8 kg (242 lb)   SpO2 95%   BMI 39.06 kg/m²     Have you been to the ER, urgent care clinic since your last visit?  Hospitalized since your last visit?   NO    Have you seen or consulted any other health care providers outside our system since your last visit?   NO      “Have you had a diabetic eye exam?”    NO     Date of last diabetic eye exam: 2/15/2023

## 2025-05-23 LAB
ALBUMIN SERPL-MCNC: 4.2 G/DL (ref 3.9–4.9)
ALBUMIN/CREAT UR: 4 MG/G CREAT (ref 0–29)
ALP SERPL-CCNC: 81 IU/L (ref 44–121)
ALT SERPL-CCNC: 16 IU/L (ref 0–44)
AST SERPL-CCNC: 19 IU/L (ref 0–40)
BASOPHILS # BLD AUTO: 0 X10E3/UL (ref 0–0.2)
BASOPHILS NFR BLD AUTO: 1 %
BILIRUB SERPL-MCNC: 0.2 MG/DL (ref 0–1.2)
BUN SERPL-MCNC: 19 MG/DL (ref 8–27)
BUN/CREAT SERPL: 22 (ref 10–24)
CALCIUM SERPL-MCNC: 9.3 MG/DL (ref 8.6–10.2)
CHLORIDE SERPL-SCNC: 103 MMOL/L (ref 96–106)
CHOLEST SERPL-MCNC: 167 MG/DL (ref 100–199)
CO2 SERPL-SCNC: 22 MMOL/L (ref 20–29)
CREAT SERPL-MCNC: 0.88 MG/DL (ref 0.76–1.27)
CREAT UR-MCNC: 88.1 MG/DL
EGFRCR SERPLBLD CKD-EPI 2021: 96 ML/MIN/1.73
EOSINOPHIL # BLD AUTO: 0.1 X10E3/UL (ref 0–0.4)
EOSINOPHIL NFR BLD AUTO: 3 %
ERYTHROCYTE [DISTWIDTH] IN BLOOD BY AUTOMATED COUNT: 15.3 % (ref 11.6–15.4)
GLOBULIN SER CALC-MCNC: 2.5 G/DL (ref 1.5–4.5)
GLUCOSE SERPL-MCNC: 143 MG/DL (ref 70–99)
HBA1C MFR BLD: 8 % (ref 4.8–5.6)
HCT VFR BLD AUTO: 47.8 % (ref 37.5–51)
HDLC SERPL-MCNC: 66 MG/DL
HGB BLD-MCNC: 15.1 G/DL (ref 13–17.7)
IMM GRANULOCYTES # BLD AUTO: 0 X10E3/UL (ref 0–0.1)
IMM GRANULOCYTES NFR BLD AUTO: 1 %
LDLC SERPL CALC-MCNC: 89 MG/DL (ref 0–99)
LYMPHOCYTES # BLD AUTO: 1.1 X10E3/UL (ref 0.7–3.1)
LYMPHOCYTES NFR BLD AUTO: 23 %
MCH RBC QN AUTO: 27 PG (ref 26.6–33)
MCHC RBC AUTO-ENTMCNC: 31.6 G/DL (ref 31.5–35.7)
MCV RBC AUTO: 86 FL (ref 79–97)
MICROALBUMIN UR-MCNC: 3.9 UG/ML
MONOCYTES # BLD AUTO: 0.5 X10E3/UL (ref 0.1–0.9)
MONOCYTES NFR BLD AUTO: 11 %
NEUTROPHILS # BLD AUTO: 2.9 X10E3/UL (ref 1.4–7)
NEUTROPHILS NFR BLD AUTO: 61 %
PLATELET # BLD AUTO: 199 X10E3/UL (ref 150–450)
POTASSIUM SERPL-SCNC: 4.1 MMOL/L (ref 3.5–5.2)
PROT SERPL-MCNC: 6.7 G/DL (ref 6–8.5)
RBC # BLD AUTO: 5.59 X10E6/UL (ref 4.14–5.8)
SODIUM SERPL-SCNC: 140 MMOL/L (ref 134–144)
TRIGL SERPL-MCNC: 58 MG/DL (ref 0–149)
VLDLC SERPL CALC-MCNC: 12 MG/DL (ref 5–40)
WBC # BLD AUTO: 4.8 X10E3/UL (ref 3.4–10.8)

## 2025-05-28 ENCOUNTER — RESULTS FOLLOW-UP (OUTPATIENT)
Dept: PRIMARY CARE CLINIC | Facility: CLINIC | Age: 65
End: 2025-05-28

## 2025-05-29 NOTE — RESULT ENCOUNTER NOTE
A1c up as we expected due to your recent radiation therapy, should improve by next check. Otherwise labs good.

## 2025-06-19 RX ORDER — EMPAGLIFLOZIN 25 MG/1
25 TABLET, FILM COATED ORAL DAILY
Qty: 90 TABLET | Refills: 1 | Status: SHIPPED | OUTPATIENT
Start: 2025-06-19

## 2025-06-25 ENCOUNTER — OFFICE VISIT (OUTPATIENT)
Dept: PRIMARY CARE CLINIC | Facility: CLINIC | Age: 65
End: 2025-06-25
Payer: COMMERCIAL

## 2025-06-25 VITALS
OXYGEN SATURATION: 96 % | TEMPERATURE: 98.2 F | SYSTOLIC BLOOD PRESSURE: 108 MMHG | BODY MASS INDEX: 39.7 KG/M2 | WEIGHT: 247 LBS | DIASTOLIC BLOOD PRESSURE: 67 MMHG | HEART RATE: 62 BPM | HEIGHT: 66 IN | RESPIRATION RATE: 16 BRPM

## 2025-06-25 DIAGNOSIS — R20.0 NUMBNESS AND TINGLING IN RIGHT HAND: Primary | ICD-10-CM

## 2025-06-25 DIAGNOSIS — R20.2 NUMBNESS AND TINGLING IN RIGHT HAND: Primary | ICD-10-CM

## 2025-06-25 DIAGNOSIS — M54.12 CERVICAL RADICULOPATHY: ICD-10-CM

## 2025-06-25 PROCEDURE — 3078F DIAST BP <80 MM HG: CPT | Performed by: NURSE PRACTITIONER

## 2025-06-25 PROCEDURE — 99213 OFFICE O/P EST LOW 20 MIN: CPT | Performed by: NURSE PRACTITIONER

## 2025-06-25 PROCEDURE — 3074F SYST BP LT 130 MM HG: CPT | Performed by: NURSE PRACTITIONER

## 2025-06-25 RX ORDER — DICLOFENAC SODIUM 75 MG/1
75 TABLET, DELAYED RELEASE ORAL 2 TIMES DAILY
Qty: 20 TABLET | Refills: 0 | Status: SHIPPED | OUTPATIENT
Start: 2025-06-25

## 2025-06-25 NOTE — PROGRESS NOTES
Chief Complaint   Patient presents with    Tingling     Right hand     /67 (BP Site: Left Upper Arm, Patient Position: Sitting)   Pulse 62   Temp 98.2 °F (36.8 °C) (Oral)   Resp 16   Ht 1.676 m (5' 6\")   Wt 112 kg (247 lb)   SpO2 96%   BMI 39.87 kg/m²     Have you been to the ER, urgent care clinic since your last visit?  Hospitalized since your last visit?   NO    Have you seen or consulted any other health care providers outside our system since your last visit?   NO      “Have you had a diabetic eye exam?”    NO     Date of last diabetic eye exam: 2/15/2023

## 2025-06-25 NOTE — PROGRESS NOTES
Jasson Quiñones is a 64 y.o. male who was seen in clinic today (6/25/2025).    Assessment & Plan:   Below is the assessment and plan developed based on review of pertinent history, physical exam, labs, studies, and medications.    1. Numbness and tingling in right hand  Comments:  I suspect this is from cervical radiculopathy with spurs and degeneration noted on xray  Orders:  -     XR CERVICAL SPINE (2-3 VIEWS); Future  -     diclofenac (VOLTAREN) 75 MG EC tablet; Take 1 tablet by mouth 2 times daily, Disp-20 tablet, R-0Normal  2. Cervical radiculopathy  Comments:  will treat with diclofenac and ice to neck.  if no improvement in 1 week can consider formal PT  Orders:  -     diclofenac (VOLTAREN) 75 MG EC tablet; Take 1 tablet by mouth 2 times daily, Disp-20 tablet, R-0Normal      No follow-ups on file.    Subjective:   Jasson was seen today for Tingling (Right hand)     Patient reported he is having intermittent right hand numbness and tingling that started x3 days ago.  Patient notes that the numbness/tingling is intermittent.  He denies any injury or pain to the area.      Has history of rotator cuff injury but denies pain to shoulder or issues with ROM.      Review of Systems   Musculoskeletal:  Negative for arthralgias and neck pain.   Neurological:  Positive for numbness (with tingling to right hand).          Objective:     Vitals:    06/25/25 1129   BP: 108/67   BP Site: Left Upper Arm   Patient Position: Sitting   Pulse: 62   Resp: 16   Temp: 98.2 °F (36.8 °C)   TempSrc: Oral   SpO2: 96%   Weight: 112 kg (247 lb)   Height: 1.676 m (5' 6\")      Body mass index is 39.87 kg/m².     Physical Exam  Constitutional:       Appearance: Normal appearance.   HENT:      Head: Normocephalic.   Eyes:      Conjunctiva/sclera: Conjunctivae normal.   Pulmonary:      Effort: Pulmonary effort is normal.   Musculoskeletal:      Right shoulder: Normal.      Right wrist: No tenderness or bony tenderness. Normal range of

## 2025-06-30 ENCOUNTER — RESULTS FOLLOW-UP (OUTPATIENT)
Dept: PRIMARY CARE CLINIC | Facility: CLINIC | Age: 65
End: 2025-06-30

## 2025-06-30 DIAGNOSIS — R20.2 NUMBNESS AND TINGLING IN RIGHT HAND: ICD-10-CM

## 2025-06-30 DIAGNOSIS — R20.0 NUMBNESS AND TINGLING IN RIGHT HAND: ICD-10-CM

## 2025-06-30 DIAGNOSIS — M54.12 CERVICAL RADICULOPATHY: Primary | ICD-10-CM

## 2025-07-11 ENCOUNTER — HOSPITAL ENCOUNTER (OUTPATIENT)
Facility: HOSPITAL | Age: 65
Discharge: HOME | End: 2025-07-14
Payer: MEDICARE

## 2025-07-11 LAB — PSA SERPL-MCNC: 7.6 NG/ML (ref 0.01–4)

## 2025-07-11 PROCEDURE — 36415 COLL VENOUS BLD VENIPUNCTURE: CPT

## 2025-07-11 PROCEDURE — G0103 PSA SCREENING: HCPCS

## 2025-07-16 ENCOUNTER — TELEPHONE (OUTPATIENT)
Dept: PRIMARY CARE CLINIC | Facility: CLINIC | Age: 65
End: 2025-07-16

## 2025-07-16 DIAGNOSIS — Z79.4 TYPE 2 DIABETES MELLITUS WITHOUT COMPLICATION, WITH LONG-TERM CURRENT USE OF INSULIN (HCC): Primary | ICD-10-CM

## 2025-07-16 DIAGNOSIS — E11.9 TYPE 2 DIABETES MELLITUS WITHOUT COMPLICATION, WITH LONG-TERM CURRENT USE OF INSULIN (HCC): Primary | ICD-10-CM

## 2025-07-16 NOTE — TELEPHONE ENCOUNTER
Patient needs a rx refill on rx BD pen ndl mini 31gx 5mm (3/16) prl send to [email or web address] mail order pharmacy

## 2025-07-17 RX ORDER — FLURBIPROFEN SODIUM 0.3 MG/ML
1 SOLUTION/ DROPS OPHTHALMIC DAILY
Qty: 100 EACH | Refills: 11 | Status: SHIPPED | OUTPATIENT
Start: 2025-07-17

## 2025-07-17 NOTE — TELEPHONE ENCOUNTER
I sent in a prescription on the following med(s):    Requested Prescriptions     Signed Prescriptions Disp Refills    Insulin Pen Needle (B-D UF III MINI PEN NEEDLES) 31G X 5 MM MISC 100 each 11     Sig: Inject 1 each into the skin daily     Authorizing Provider: SONDRA BIGGS        To the below pharmacy:    [facility] - [place], [place] - [address] [place] [address] - P [phone] - F [phone] [address] [place] [address]  Phone: [phone] Fax: [phone]       Let me know if you need anything else.

## 2025-07-18 ENCOUNTER — HOSPITAL ENCOUNTER (OUTPATIENT)
Facility: HOSPITAL | Age: 65
Discharge: HOME | End: 2025-07-21
Attending: STUDENT IN AN ORGANIZED HEALTH CARE EDUCATION/TRAINING PROGRAM

## 2025-07-18 VITALS
RESPIRATION RATE: 18 BRPM | WEIGHT: 247 LBS | SYSTOLIC BLOOD PRESSURE: 132 MMHG | DIASTOLIC BLOOD PRESSURE: 61 MMHG | BODY MASS INDEX: 39.7 KG/M2 | HEIGHT: 66 IN

## 2025-07-18 DIAGNOSIS — C61 PROSTATE CANCER (HCC): Primary | ICD-10-CM

## 2025-07-18 ASSESSMENT — PAIN SCALES - GENERAL: PAINLEVEL_OUTOF10: 0

## 2025-07-18 NOTE — PROGRESS NOTES
Continuous Glucose Sensor (DEXCOM G7 SENSOR) MISC Use every 10 days    diclofenac (VOLTAREN) 75 mg, Oral, 2 TIMES DAILY    ibuprofen (ADVIL;MOTRIN) 800 mg, Oral, 3 TIMES DAILY WITH MEALS    insulin glargine, 1 unit dial, (TOUJEO SOLOSTAR) 300 UNIT/ML concentrated injection pen INJECT 15 UNITS UNDER THE SKIN EVERY NIGHT AT BEDTIME    Insulin Pen Needle (B-D UF III MINI PEN NEEDLES) 31G X 5 MM MISC 1 each, SubCUTAneous, DAILY    Jardiance 25 mg, Oral, DAILY    Liraglutide (VICTOZA) 0.6 mg, SubCUTAneous, DAILY    losartan (COZAAR) 100 mg, Oral, DAILY    metFORMIN (GLUCOPHAGE-XR) 500 MG extended release tablet TAKE 4 TABLETS BY MOUTH DAILY WITH BREAKFAST        Physical Exam   Vitals: Blood pressure 132/61, resp. rate 18, height 1.676 m (5' 6\"), weight 112 kg (247 lb).  Performance Status: ECOG 0, fully active, able to carry on all predisease activities without restrictions  Constitutional: Pleasant, sitting comfortably in no acute distress.   HEENT: Moist mucous membranes.  Cardiac: Good peripheral perfusion, no upper or lower extremity edema bilaterally.  Pulmonary: No increased work of breathing. Symmetric chest rise  Skin: Warm, dry, no rashes noted.  Neurologic: Alert and oriented.  Psychiatric: Cooperative to commands and responds to questions appropriately.    Assessment & Plan   Mr. Quiñones is a 65 y.o. male with a history of a [id number], iPSA 6.6, GG2 (+1/12) favorable intermediate risk prostate cancer s/p EBRT (04/04/25) who is recovering well from treatment though with persistently elevated PSA.     Plan:  - Reviewed surveillance paradigm for prostate cancer including PSA q3mo for the first year then q6-12mo thereafter. Will alternate visits with Dr. Schneider  - I talked about how there was a decent time interval between his pre-treatment PSA and actual treatment and we thereby may be underestimating his baseline. It would be very rare for his prostate cancer (given its characteristics) to progress this soon from

## 2025-08-04 ENCOUNTER — PATIENT MESSAGE (OUTPATIENT)
Dept: PRIMARY CARE CLINIC | Facility: CLINIC | Age: 65
End: 2025-08-04

## 2025-08-04 DIAGNOSIS — E11.3411 SEVERE NONPROLIFERATIVE DIABETIC RETINOPATHY OF RIGHT EYE WITH MACULAR EDEMA ASSOCIATED WITH TYPE 2 DIABETES MELLITUS (HCC): Primary | ICD-10-CM

## 2025-08-20 ENCOUNTER — PATIENT MESSAGE (OUTPATIENT)
Dept: PRIMARY CARE CLINIC | Facility: CLINIC | Age: 65
End: 2025-08-20

## 2025-08-20 DIAGNOSIS — Z79.4 TYPE 2 DIABETES MELLITUS WITH HYPERGLYCEMIA, WITH LONG-TERM CURRENT USE OF INSULIN (HCC): ICD-10-CM

## 2025-08-20 DIAGNOSIS — Z79.4 TYPE 2 DIABETES MELLITUS WITHOUT COMPLICATION, WITH LONG-TERM CURRENT USE OF INSULIN (HCC): ICD-10-CM

## 2025-08-20 DIAGNOSIS — E11.65 TYPE 2 DIABETES MELLITUS WITH HYPERGLYCEMIA, WITH LONG-TERM CURRENT USE OF INSULIN (HCC): ICD-10-CM

## 2025-08-20 DIAGNOSIS — E78.5 DYSLIPIDEMIA, GOAL LDL BELOW 100: ICD-10-CM

## 2025-08-20 DIAGNOSIS — E11.9 TYPE 2 DIABETES MELLITUS WITHOUT COMPLICATION, WITH LONG-TERM CURRENT USE OF INSULIN (HCC): ICD-10-CM

## 2025-08-20 DIAGNOSIS — E11.3411 SEVERE NONPROLIFERATIVE DIABETIC RETINOPATHY OF RIGHT EYE WITH MACULAR EDEMA ASSOCIATED WITH TYPE 2 DIABETES MELLITUS (HCC): ICD-10-CM

## 2025-08-20 DIAGNOSIS — I10 HYPERTENSION, UNSPECIFIED TYPE: ICD-10-CM

## 2025-08-20 RX ORDER — ATORVASTATIN CALCIUM 20 MG/1
20 TABLET, FILM COATED ORAL DAILY
Qty: 90 TABLET | Refills: 1 | Status: SHIPPED | OUTPATIENT
Start: 2025-08-20

## 2025-08-20 RX ORDER — INSULIN GLARGINE 300 U/ML
15 INJECTION, SOLUTION SUBCUTANEOUS NIGHTLY
Qty: 3 ADJUSTABLE DOSE PRE-FILLED PEN SYRINGE | Refills: 1 | Status: SHIPPED | OUTPATIENT
Start: 2025-08-20

## 2025-08-20 RX ORDER — METFORMIN HYDROCHLORIDE 500 MG/1
1000 TABLET, EXTENDED RELEASE ORAL 2 TIMES DAILY
Qty: 360 TABLET | Refills: 1 | Status: SHIPPED | OUTPATIENT
Start: 2025-08-20

## 2025-08-20 RX ORDER — LOSARTAN POTASSIUM 100 MG/1
100 TABLET ORAL DAILY
Qty: 90 TABLET | Refills: 1 | Status: SHIPPED | OUTPATIENT
Start: 2025-08-20

## 2025-08-22 RX ORDER — FLURBIPROFEN SODIUM 0.3 MG/ML
1 SOLUTION/ DROPS OPHTHALMIC DAILY
Qty: 100 EACH | Refills: 11 | Status: SHIPPED | OUTPATIENT
Start: 2025-08-22